# Patient Record
Sex: FEMALE | Race: WHITE | NOT HISPANIC OR LATINO | URBAN - METROPOLITAN AREA
[De-identification: names, ages, dates, MRNs, and addresses within clinical notes are randomized per-mention and may not be internally consistent; named-entity substitution may affect disease eponyms.]

---

## 2023-05-15 ENCOUNTER — INPATIENT (INPATIENT)
Facility: HOSPITAL | Age: 78
LOS: 4 days | Discharge: NURSING, REHAB OR LONG-TERM CARE | DRG: 683 | End: 2023-05-20
Attending: INTERNAL MEDICINE | Admitting: INTERNAL MEDICINE
Payer: MEDICARE

## 2023-05-15 VITALS
TEMPERATURE: 97 F | DIASTOLIC BLOOD PRESSURE: 63 MMHG | SYSTOLIC BLOOD PRESSURE: 118 MMHG | RESPIRATION RATE: 20 BRPM | HEART RATE: 64 BPM | OXYGEN SATURATION: 94 %

## 2023-05-15 DIAGNOSIS — Z79.899 OTHER LONG TERM (CURRENT) DRUG THERAPY: ICD-10-CM

## 2023-05-15 DIAGNOSIS — I44.7 LEFT BUNDLE-BRANCH BLOCK, UNSPECIFIED: ICD-10-CM

## 2023-05-15 DIAGNOSIS — K22.2 ESOPHAGEAL OBSTRUCTION: ICD-10-CM

## 2023-05-15 DIAGNOSIS — N18.32 CHRONIC KIDNEY DISEASE, STAGE 3B: ICD-10-CM

## 2023-05-15 DIAGNOSIS — K44.9 DIAPHRAGMATIC HERNIA WITHOUT OBSTRUCTION OR GANGRENE: ICD-10-CM

## 2023-05-15 DIAGNOSIS — D73.9 DISEASE OF SPLEEN, UNSPECIFIED: ICD-10-CM

## 2023-05-15 DIAGNOSIS — I13.0 HYPERTENSIVE HEART AND CHRONIC KIDNEY DISEASE WITH HEART FAILURE AND STAGE 1 THROUGH STAGE 4 CHRONIC KIDNEY DISEASE, OR UNSPECIFIED CHRONIC KIDNEY DISEASE: ICD-10-CM

## 2023-05-15 DIAGNOSIS — K59.00 CONSTIPATION, UNSPECIFIED: ICD-10-CM

## 2023-05-15 DIAGNOSIS — R73.03 PREDIABETES: ICD-10-CM

## 2023-05-15 DIAGNOSIS — K29.70 GASTRITIS, UNSPECIFIED, WITHOUT BLEEDING: ICD-10-CM

## 2023-05-15 DIAGNOSIS — K02.9 DENTAL CARIES, UNSPECIFIED: ICD-10-CM

## 2023-05-15 DIAGNOSIS — R53.1 WEAKNESS: ICD-10-CM

## 2023-05-15 DIAGNOSIS — I50.32 CHRONIC DIASTOLIC (CONGESTIVE) HEART FAILURE: ICD-10-CM

## 2023-05-15 DIAGNOSIS — I24.8 OTHER FORMS OF ACUTE ISCHEMIC HEART DISEASE: ICD-10-CM

## 2023-05-15 DIAGNOSIS — K25.9 GASTRIC ULCER, UNSPECIFIED AS ACUTE OR CHRONIC, WITHOUT HEMORRHAGE OR PERFORATION: ICD-10-CM

## 2023-05-15 DIAGNOSIS — N17.9 ACUTE KIDNEY FAILURE, UNSPECIFIED: ICD-10-CM

## 2023-05-15 DIAGNOSIS — K75.3 GRANULOMATOUS HEPATITIS, NOT ELSEWHERE CLASSIFIED: ICD-10-CM

## 2023-05-15 DIAGNOSIS — E86.0 DEHYDRATION: ICD-10-CM

## 2023-05-15 DIAGNOSIS — E03.9 HYPOTHYROIDISM, UNSPECIFIED: ICD-10-CM

## 2023-05-15 DIAGNOSIS — K08.409 PARTIAL LOSS OF TEETH, UNSPECIFIED CAUSE, UNSPECIFIED CLASS: ICD-10-CM

## 2023-05-15 DIAGNOSIS — E87.6 HYPOKALEMIA: ICD-10-CM

## 2023-05-15 DIAGNOSIS — M47.9 SPONDYLOSIS, UNSPECIFIED: ICD-10-CM

## 2023-05-15 DIAGNOSIS — Z87.891 PERSONAL HISTORY OF NICOTINE DEPENDENCE: ICD-10-CM

## 2023-05-15 DIAGNOSIS — D72.828 OTHER ELEVATED WHITE BLOOD CELL COUNT: ICD-10-CM

## 2023-05-15 DIAGNOSIS — N20.0 CALCULUS OF KIDNEY: ICD-10-CM

## 2023-05-15 DIAGNOSIS — R13.10 DYSPHAGIA, UNSPECIFIED: ICD-10-CM

## 2023-05-15 DIAGNOSIS — G89.29 OTHER CHRONIC PAIN: ICD-10-CM

## 2023-05-15 DIAGNOSIS — R93.89 ABNORMAL FINDINGS ON DIAGNOSTIC IMAGING OF OTHER SPECIFIED BODY STRUCTURES: ICD-10-CM

## 2023-05-15 DIAGNOSIS — Z20.822 CONTACT WITH AND (SUSPECTED) EXPOSURE TO COVID-19: ICD-10-CM

## 2023-05-15 DIAGNOSIS — E83.42 HYPOMAGNESEMIA: ICD-10-CM

## 2023-05-15 DIAGNOSIS — I27.20 PULMONARY HYPERTENSION, UNSPECIFIED: ICD-10-CM

## 2023-05-15 LAB
ALBUMIN SERPL ELPH-MCNC: 4.1 G/DL — SIGNIFICANT CHANGE UP (ref 3.5–5.2)
ALP SERPL-CCNC: 65 U/L — SIGNIFICANT CHANGE UP (ref 30–115)
ALT FLD-CCNC: <5 U/L — SIGNIFICANT CHANGE UP (ref 0–41)
ANION GAP SERPL CALC-SCNC: 14 MMOL/L — SIGNIFICANT CHANGE UP (ref 7–14)
ANION GAP SERPL CALC-SCNC: 17 MMOL/L — HIGH (ref 7–14)
APPEARANCE UR: CLEAR — SIGNIFICANT CHANGE UP
AST SERPL-CCNC: 11 U/L — SIGNIFICANT CHANGE UP (ref 0–41)
BACTERIA # UR AUTO: ABNORMAL /HPF
BASE EXCESS BLDV CALC-SCNC: 2.6 MMOL/L — SIGNIFICANT CHANGE UP (ref -2–3)
BASOPHILS # BLD AUTO: 0.07 K/UL — SIGNIFICANT CHANGE UP (ref 0–0.2)
BASOPHILS NFR BLD AUTO: 0.7 % — SIGNIFICANT CHANGE UP (ref 0–1)
BILIRUB SERPL-MCNC: 0.9 MG/DL — SIGNIFICANT CHANGE UP (ref 0.2–1.2)
BILIRUB UR-MCNC: NEGATIVE — SIGNIFICANT CHANGE UP
BUN SERPL-MCNC: 34 MG/DL — HIGH (ref 10–20)
BUN SERPL-MCNC: 41 MG/DL — HIGH (ref 10–20)
CA-I SERPL-SCNC: 1.19 MMOL/L — SIGNIFICANT CHANGE UP (ref 1.15–1.33)
CALCIUM SERPL-MCNC: 10.3 MG/DL — SIGNIFICANT CHANGE UP (ref 8.4–10.5)
CALCIUM SERPL-MCNC: 9.4 MG/DL — SIGNIFICANT CHANGE UP (ref 8.4–10.5)
CHLORIDE SERPL-SCNC: 100 MMOL/L — SIGNIFICANT CHANGE UP (ref 98–110)
CHLORIDE SERPL-SCNC: 104 MMOL/L — SIGNIFICANT CHANGE UP (ref 98–110)
CK MB CFR SERPL CALC: 1.6 NG/ML — SIGNIFICANT CHANGE UP (ref 0.6–6.3)
CK SERPL-CCNC: 40 U/L — SIGNIFICANT CHANGE UP (ref 0–225)
CO2 SERPL-SCNC: 25 MMOL/L — SIGNIFICANT CHANGE UP (ref 17–32)
CO2 SERPL-SCNC: 25 MMOL/L — SIGNIFICANT CHANGE UP (ref 17–32)
COLOR SPEC: YELLOW — SIGNIFICANT CHANGE UP
CREAT SERPL-MCNC: 2.7 MG/DL — HIGH (ref 0.7–1.5)
CREAT SERPL-MCNC: 3.5 MG/DL — HIGH (ref 0.7–1.5)
DIFF PNL FLD: NEGATIVE — SIGNIFICANT CHANGE UP
EGFR: 13 ML/MIN/1.73M2 — LOW
EGFR: 18 ML/MIN/1.73M2 — LOW
EOSINOPHIL # BLD AUTO: 0.23 K/UL — SIGNIFICANT CHANGE UP (ref 0–0.7)
EOSINOPHIL NFR BLD AUTO: 2.2 % — SIGNIFICANT CHANGE UP (ref 0–8)
EPI CELLS # UR: ABNORMAL /HPF (ref 0–5)
GAS PNL BLDV: 135 MMOL/L — LOW (ref 136–145)
GAS PNL BLDV: SIGNIFICANT CHANGE UP
GLUCOSE SERPL-MCNC: 108 MG/DL — HIGH (ref 70–99)
GLUCOSE SERPL-MCNC: 115 MG/DL — HIGH (ref 70–99)
GLUCOSE UR QL: NEGATIVE MG/DL — SIGNIFICANT CHANGE UP
HCO3 BLDV-SCNC: 28 MMOL/L — SIGNIFICANT CHANGE UP (ref 22–29)
HCT VFR BLD CALC: 36.7 % — LOW (ref 37–47)
HCT VFR BLDA CALC: 52 % — HIGH (ref 39–51)
HGB BLD CALC-MCNC: 17.2 G/DL — SIGNIFICANT CHANGE UP (ref 12.6–17.4)
HGB BLD-MCNC: 12.8 G/DL — SIGNIFICANT CHANGE UP (ref 12–16)
IMM GRANULOCYTES NFR BLD AUTO: 0.3 % — SIGNIFICANT CHANGE UP (ref 0.1–0.3)
KETONES UR-MCNC: NEGATIVE — SIGNIFICANT CHANGE UP
LACTATE BLDV-MCNC: 1.3 MMOL/L — SIGNIFICANT CHANGE UP (ref 0.5–2)
LEUKOCYTE ESTERASE UR-ACNC: NEGATIVE — SIGNIFICANT CHANGE UP
LIDOCAIN IGE QN: 42 U/L — SIGNIFICANT CHANGE UP (ref 7–60)
LYMPHOCYTES # BLD AUTO: 19.8 % — LOW (ref 20.5–51.1)
LYMPHOCYTES # BLD AUTO: 2.09 K/UL — SIGNIFICANT CHANGE UP (ref 1.2–3.4)
MAGNESIUM SERPL-MCNC: 1.5 MG/DL — LOW (ref 1.8–2.4)
MAGNESIUM SERPL-MCNC: 1.6 MG/DL — LOW (ref 1.8–2.4)
MCHC RBC-ENTMCNC: 33.2 PG — HIGH (ref 27–31)
MCHC RBC-ENTMCNC: 34.9 G/DL — SIGNIFICANT CHANGE UP (ref 32–37)
MCV RBC AUTO: 95.3 FL — SIGNIFICANT CHANGE UP (ref 81–99)
MONOCYTES # BLD AUTO: 0.86 K/UL — HIGH (ref 0.1–0.6)
MONOCYTES NFR BLD AUTO: 8.1 % — SIGNIFICANT CHANGE UP (ref 1.7–9.3)
NEUTROPHILS # BLD AUTO: 7.28 K/UL — HIGH (ref 1.4–6.5)
NEUTROPHILS NFR BLD AUTO: 68.9 % — SIGNIFICANT CHANGE UP (ref 42.2–75.2)
NITRITE UR-MCNC: NEGATIVE — SIGNIFICANT CHANGE UP
NRBC # BLD: 0 /100 WBCS — SIGNIFICANT CHANGE UP (ref 0–0)
PCO2 BLDV: 44 MMHG — HIGH (ref 39–42)
PH BLDV: 7.41 — SIGNIFICANT CHANGE UP (ref 7.32–7.43)
PH UR: 5.5 — SIGNIFICANT CHANGE UP (ref 5–8)
PLATELET # BLD AUTO: 253 K/UL — SIGNIFICANT CHANGE UP (ref 130–400)
PMV BLD: 10.9 FL — HIGH (ref 7.4–10.4)
PO2 BLDV: 30 MMHG — SIGNIFICANT CHANGE UP
POTASSIUM BLDV-SCNC: 2.6 MMOL/L — CRITICAL LOW (ref 3.5–5.1)
POTASSIUM SERPL-MCNC: 2.7 MMOL/L — CRITICAL LOW (ref 3.5–5)
POTASSIUM SERPL-MCNC: 2.8 MMOL/L — LOW (ref 3.5–5)
POTASSIUM SERPL-SCNC: 2.7 MMOL/L — CRITICAL LOW (ref 3.5–5)
POTASSIUM SERPL-SCNC: 2.8 MMOL/L — LOW (ref 3.5–5)
PROT SERPL-MCNC: 6.7 G/DL — SIGNIFICANT CHANGE UP (ref 6–8)
PROT UR-MCNC: ABNORMAL MG/DL
RBC # BLD: 3.85 M/UL — LOW (ref 4.2–5.4)
RBC # FLD: 12.5 % — SIGNIFICANT CHANGE UP (ref 11.5–14.5)
RBC CASTS # UR COMP ASSIST: SIGNIFICANT CHANGE UP /HPF (ref 0–4)
SAO2 % BLDV: 54.6 % — SIGNIFICANT CHANGE UP
SODIUM SERPL-SCNC: 142 MMOL/L — SIGNIFICANT CHANGE UP (ref 135–146)
SODIUM SERPL-SCNC: 143 MMOL/L — SIGNIFICANT CHANGE UP (ref 135–146)
SP GR SPEC: >=1.03 (ref 1.01–1.03)
TROPONIN T SERPL-MCNC: 0.03 NG/ML — CRITICAL HIGH
TROPONIN T SERPL-MCNC: 0.04 NG/ML — CRITICAL HIGH
UROBILINOGEN FLD QL: 0.2 MG/DL — SIGNIFICANT CHANGE UP
WBC # BLD: 10.56 K/UL — SIGNIFICANT CHANGE UP (ref 4.8–10.8)
WBC # FLD AUTO: 10.56 K/UL — SIGNIFICANT CHANGE UP (ref 4.8–10.8)
WBC UR QL: SIGNIFICANT CHANGE UP /HPF (ref 0–5)

## 2023-05-15 PROCEDURE — 97116 GAIT TRAINING THERAPY: CPT | Mod: GP

## 2023-05-15 PROCEDURE — 85027 COMPLETE CBC AUTOMATED: CPT

## 2023-05-15 PROCEDURE — 80048 BASIC METABOLIC PNL TOTAL CA: CPT

## 2023-05-15 PROCEDURE — 74176 CT ABD & PELVIS W/O CONTRAST: CPT | Mod: 26,MA

## 2023-05-15 PROCEDURE — 82962 GLUCOSE BLOOD TEST: CPT

## 2023-05-15 PROCEDURE — 84436 ASSAY OF TOTAL THYROXINE: CPT

## 2023-05-15 PROCEDURE — 92610 EVALUATE SWALLOWING FUNCTION: CPT | Mod: GN

## 2023-05-15 PROCEDURE — 85730 THROMBOPLASTIN TIME PARTIAL: CPT

## 2023-05-15 PROCEDURE — 84100 ASSAY OF PHOSPHORUS: CPT

## 2023-05-15 PROCEDURE — 97110 THERAPEUTIC EXERCISES: CPT | Mod: GP

## 2023-05-15 PROCEDURE — 84484 ASSAY OF TROPONIN QUANT: CPT

## 2023-05-15 PROCEDURE — 94760 N-INVAS EAR/PLS OXIMETRY 1: CPT

## 2023-05-15 PROCEDURE — 83735 ASSAY OF MAGNESIUM: CPT

## 2023-05-15 PROCEDURE — 36415 COLL VENOUS BLD VENIPUNCTURE: CPT

## 2023-05-15 PROCEDURE — 97162 PT EVAL MOD COMPLEX 30 MIN: CPT | Mod: GP

## 2023-05-15 PROCEDURE — 82550 ASSAY OF CK (CPK): CPT

## 2023-05-15 PROCEDURE — 84443 ASSAY THYROID STIM HORMONE: CPT

## 2023-05-15 PROCEDURE — 83036 HEMOGLOBIN GLYCOSYLATED A1C: CPT

## 2023-05-15 PROCEDURE — 71045 X-RAY EXAM CHEST 1 VIEW: CPT | Mod: 26

## 2023-05-15 PROCEDURE — 88305 TISSUE EXAM BY PATHOLOGIST: CPT

## 2023-05-15 PROCEDURE — 80053 COMPREHEN METABOLIC PANEL: CPT

## 2023-05-15 PROCEDURE — 85610 PROTHROMBIN TIME: CPT

## 2023-05-15 PROCEDURE — 87635 SARS-COV-2 COVID-19 AMP PRB: CPT

## 2023-05-15 PROCEDURE — 82553 CREATINE MB FRACTION: CPT

## 2023-05-15 PROCEDURE — 93306 TTE W/DOPPLER COMPLETE: CPT

## 2023-05-15 PROCEDURE — 85025 COMPLETE CBC W/AUTO DIFF WBC: CPT

## 2023-05-15 PROCEDURE — 99285 EMERGENCY DEPT VISIT HI MDM: CPT | Mod: FS

## 2023-05-15 PROCEDURE — 88312 SPECIAL STAINS GROUP 1: CPT

## 2023-05-15 PROCEDURE — 99222 1ST HOSP IP/OBS MODERATE 55: CPT

## 2023-05-15 PROCEDURE — 84439 ASSAY OF FREE THYROXINE: CPT

## 2023-05-15 RX ORDER — ACETAMINOPHEN 500 MG
650 TABLET ORAL EVERY 6 HOURS
Refills: 0 | Status: DISCONTINUED | OUTPATIENT
Start: 2023-05-15 | End: 2023-05-19

## 2023-05-15 RX ORDER — POTASSIUM CHLORIDE 20 MEQ
20 PACKET (EA) ORAL ONCE
Refills: 0 | Status: COMPLETED | OUTPATIENT
Start: 2023-05-15 | End: 2023-05-15

## 2023-05-15 RX ORDER — AMLODIPINE BESYLATE 2.5 MG/1
5 TABLET ORAL EVERY 24 HOURS
Refills: 0 | Status: DISCONTINUED | OUTPATIENT
Start: 2023-05-15 | End: 2023-05-19

## 2023-05-15 RX ORDER — MAGNESIUM OXIDE 400 MG ORAL TABLET 241.3 MG
400 TABLET ORAL ONCE
Refills: 0 | Status: COMPLETED | OUTPATIENT
Start: 2023-05-15 | End: 2023-05-15

## 2023-05-15 RX ORDER — SODIUM CHLORIDE 9 MG/ML
1000 INJECTION INTRAMUSCULAR; INTRAVENOUS; SUBCUTANEOUS
Refills: 0 | Status: DISCONTINUED | OUTPATIENT
Start: 2023-05-15 | End: 2023-05-17

## 2023-05-15 RX ORDER — MAGNESIUM SULFATE 500 MG/ML
2 VIAL (ML) INJECTION ONCE
Refills: 0 | Status: COMPLETED | OUTPATIENT
Start: 2023-05-15 | End: 2023-05-16

## 2023-05-15 RX ORDER — POTASSIUM CHLORIDE 20 MEQ
20 PACKET (EA) ORAL
Refills: 0 | Status: COMPLETED | OUTPATIENT
Start: 2023-05-15 | End: 2023-05-16

## 2023-05-15 RX ORDER — CHLORHEXIDINE GLUCONATE 213 G/1000ML
1 SOLUTION TOPICAL
Refills: 0 | Status: DISCONTINUED | OUTPATIENT
Start: 2023-05-15 | End: 2023-05-19

## 2023-05-15 RX ORDER — METOPROLOL TARTRATE 50 MG
50 TABLET ORAL DAILY
Refills: 0 | Status: DISCONTINUED | OUTPATIENT
Start: 2023-05-15 | End: 2023-05-19

## 2023-05-15 RX ORDER — LEVOTHYROXINE SODIUM 125 MCG
25 TABLET ORAL
Refills: 0 | Status: DISCONTINUED | OUTPATIENT
Start: 2023-05-15 | End: 2023-05-19

## 2023-05-15 RX ORDER — AMLODIPINE BESYLATE 2.5 MG/1
0 TABLET ORAL
Refills: 0 | DISCHARGE

## 2023-05-15 RX ORDER — PANTOPRAZOLE SODIUM 20 MG/1
40 TABLET, DELAYED RELEASE ORAL
Refills: 0 | Status: DISCONTINUED | OUTPATIENT
Start: 2023-05-15 | End: 2023-05-16

## 2023-05-15 RX ORDER — HEPARIN SODIUM 5000 [USP'U]/ML
5000 INJECTION INTRAVENOUS; SUBCUTANEOUS EVERY 8 HOURS
Refills: 0 | Status: DISCONTINUED | OUTPATIENT
Start: 2023-05-15 | End: 2023-05-19

## 2023-05-15 RX ORDER — SODIUM CHLORIDE 9 MG/ML
1000 INJECTION INTRAMUSCULAR; INTRAVENOUS; SUBCUTANEOUS ONCE
Refills: 0 | Status: COMPLETED | OUTPATIENT
Start: 2023-05-15 | End: 2023-05-15

## 2023-05-15 RX ORDER — METOPROLOL TARTRATE 50 MG
0 TABLET ORAL
Refills: 0 | DISCHARGE

## 2023-05-15 RX ADMIN — SODIUM CHLORIDE 75 MILLILITER(S): 9 INJECTION INTRAMUSCULAR; INTRAVENOUS; SUBCUTANEOUS at 16:29

## 2023-05-15 RX ADMIN — SODIUM CHLORIDE 1000 MILLILITER(S): 9 INJECTION INTRAMUSCULAR; INTRAVENOUS; SUBCUTANEOUS at 12:24

## 2023-05-15 RX ADMIN — HEPARIN SODIUM 5000 UNIT(S): 5000 INJECTION INTRAVENOUS; SUBCUTANEOUS at 21:16

## 2023-05-15 RX ADMIN — Medication 20 MILLIEQUIVALENT(S): at 14:09

## 2023-05-15 RX ADMIN — MAGNESIUM OXIDE 400 MG ORAL TABLET 400 MILLIGRAM(S): 241.3 TABLET ORAL at 13:20

## 2023-05-15 RX ADMIN — Medication 20 MILLIEQUIVALENT(S): at 22:45

## 2023-05-15 RX ADMIN — SODIUM CHLORIDE 75 MILLILITER(S): 9 INJECTION INTRAMUSCULAR; INTRAVENOUS; SUBCUTANEOUS at 17:18

## 2023-05-15 NOTE — ED PROVIDER NOTE - NSICDXPASTMEDICALHX_GEN_ALL_CORE_FT
PAST MEDICAL HISTORY:  Arthritis     HTN (hypertension)     Hypothyroidism     Kidney stone     Stage 3 chronic kidney disease

## 2023-05-15 NOTE — ED PROVIDER NOTE - NS ED ROS FT
Review of Systems    Constitutional: (-) fever, (-) chills, (+)weakness  Eyes/ENT: (-) blurry vision, (-) epistaxis, (-) sore throat  Cardiovascular: (-) chest pain, (-) syncope, (+)palpitations  Respiratory: (-) cough, (-) shortness of breath  Gastrointestinal: (+) decreased PO intake, (-) pain, (-) nausea, (-) vomiting, (-) diarrhea  Musculoskeletal: (-) neck pain, (-) back pain, (-) body aches  Integumentary: (-) rash, (-) edema  Neurological: (-) headache, (-) altered mental status  Psychiatric: (-) hallucinations  Allergic/Immunologic: (-) pruritus

## 2023-05-15 NOTE — CONSULT NOTE ADULT - SUBJECTIVE AND OBJECTIVE BOX
HPI:  A 76 y/o female with pmhx of HTN, CKD 3, hypothyroidism, kidney stone , pre-DM,  arthritis of spine  presents with generalized weakness. PT reports not feeling well for the past week. PT reports not eating much, has appetite, reports eats few bites a day. Pt reports 2 weeks ago had dental infection/abscess had 2 extraction at front was treated with amoxicillin x 1 week. Pt denies difficulty swallowing, PT reports feeling weak today , could not get up. PT usually ambulates with a cane.  PT denies nausea or vomiting. Pt reports palpitation yesterday for few minutes. Pt denies chest pain or shortness of breath. PT reports hx of constipation, last BM yesterday. Pt reports dizziness yesterday systolic 95.  PT reports does not follow up with nephrologist for CKD. PT states has been taking at least 600  mg ibuprofen daily for past 2 weeks for toothache. PT denies fever, chills, cough, burning with urination. Pt lives with her sister needs help at home per son.  (15 May 2023 14:44)        HPI-Cardiology   Pt with the above Hx, evaluated at bedside. Pt....................... Radiology tests and hospital records, were reviewed, as well as previous notes on this patient.          PAST MEDICAL & SURGICAL HISTORY  HTN (hypertension)    Hypothyroidism    Stage 3 chronic kidney disease    Arthritis    Kidney stone          ALLERGIES:  No Known Allergies      MEDICATIONS:  MEDICATIONS  (STANDING):  amLODIPine   Tablet 5 milliGRAM(s) Oral every 24 hours  chlorhexidine 2% Cloths 1 Application(s) Topical <User Schedule>  heparin   Injectable 5000 Unit(s) SubCutaneous every 8 hours  levothyroxine 25 MICROGram(s) Oral <User Schedule>  metoprolol succinate ER 50 milliGRAM(s) Oral daily  pantoprazole    Tablet 40 milliGRAM(s) Oral before breakfast  sodium chloride 0.9%. 1000 milliLiter(s) (75 mL/Hr) IV Continuous <Continuous>    MEDICATIONS  (PRN):  acetaminophen     Tablet .. 650 milliGRAM(s) Oral every 6 hours PRN Temp greater or equal to 38C (100.4F), Mild Pain (1 - 3)      HOME MEDICATIONS:  Home Medications:  amLODIPine 5 mg oral tablet: 1 orally once a day (15 May 2023 15:33)  losartan-hydrochlorothiazide 50 mg-12.5 mg oral tablet: 1 orally once a day (15 May 2023 15:33)  metoprolol succinate 50 mg oral capsule, extended release: 1 orally once a day (15 May 2023 15:33)  Synthroid 25 mcg (0.025 mg) oral tablet: 1 orally 2 times a week every Tuesday (15 May 2023 15:33)      VITALS:   T(F): 96.5 (05-15 @ 13:55), Max: 96.8 (05-15 @ 09:54)  HR: 60 (05-15 @ 14:48) (53 - 64)  BP: 105/52 (05-15 @ 14:48) (105/52 - 120/58)  BP(mean): --  RR: 18 (05-15 @ 14:48) (18 - 20)  SpO2: 95% (05-15 @ 14:48) (94% - 95%)        REVIEW OF SYSTEMS:  See HPI        PHYSICAL EXAM:  NEURO: patient is awake , alert and oriented  GEN: Not in acute distress  NECK: no thyroid enlargement, no JVD  LUNGS: Clear to auscultation bilaterally   CARDIOVASCULAR: S1/S2 present, RRR , no murmurs or rubs, no carotid bruits,  + PP bilaterally  ABD: Soft, non-tender, non-distended, +BS  EXT: No LENCHO  SKIN: Intact          LABS:                        12.8   10.56 )-----------( 253      ( 15 May 2023 10:35 )             36.7     05-15    142  |  100  |  41<H>  ----------------------------<  115<H>  2.7<LL>   |  25  |  3.5<H>    Ca    10.3      15 May 2023 10:35  Mg     1.6     05-15    TPro  6.7  /  Alb  4.1  /  TBili  0.9  /  DBili  x   /  AST  11  /  ALT  <5  /  AlkPhos  65  05-15      Troponin T, Serum: 0.04 ng/mL *HH* (05-15-23 @ 10:35)    CARDIAC MARKERS ( 15 May 2023 10:35 )  x     / 0.04 ng/mL / x     / x     / x                RADIOLOGY:  -CXR:  < from: Xray Chest 1 View- PORTABLE-Urgent (05.15.23 @ 10:56) >  Impression:    No radiographic evidence of acute cardiopulmonary disease.        --- End of Report ---    < end of copied text >          ECG:  < from: 12 Lead ECG (05.15.23 @ 10:06) >   Sinus bradycardia  Left axis deviation  Left bundle branch block  Abnormal ECG    Confirmed by Shahid Rodriguez ([id number]) on 5/15/2023 12:02:42 PM    < end of copied text >     HPI:  A 76 y/o female with pmhx of HTN, CKD 3, hypothyroidism, kidney stone , pre-DM,  arthritis of spine  presents with generalized weakness. PT reports not feeling well for the past week. PT reports not eating much, has appetite, reports eats few bites a day. Pt reports 2 weeks ago had dental infection/abscess had 2 extraction at front was treated with amoxicillin x 1 week. Pt denies difficulty swallowing, PT reports feeling weak today , could not get up. PT usually ambulates with a cane.  PT denies nausea or vomiting. Pt reports palpitation yesterday for few minutes. Pt denies chest pain or shortness of breath. PT reports hx of constipation, last BM yesterday. Pt reports dizziness yesterday systolic 95.  PT reports does not follow up with nephrologist for CKD. PT states has been taking at least 600  mg ibuprofen daily for past 2 weeks for toothache. PT denies fever, chills, cough, burning with urination. Pt lives with her sister needs help at home per son.  (15 May 2023 14:44)        HPI-Cardiology   Pt with the above Hx, evaluated at bedside. Pt presented for eval of generalized weakness. Pt endorses poor PO intake for the past month and progressively feeling weaker. Endorses episode of palpitations while going to the bathroom which subsided after 20min. Currently denies any CP, palpitations SOB, dizziness/lightheadedness or nausea/vomiting. Radiology tests and hospital records, were reviewed, as well as previous notes on this patient.          PAST MEDICAL & SURGICAL HISTORY  HTN (hypertension)    Hypothyroidism    Stage 3 chronic kidney disease    Arthritis    Kidney stone          ALLERGIES:  No Known Allergies      MEDICATIONS:  MEDICATIONS  (STANDING):  amLODIPine   Tablet 5 milliGRAM(s) Oral every 24 hours  chlorhexidine 2% Cloths 1 Application(s) Topical <User Schedule>  heparin   Injectable 5000 Unit(s) SubCutaneous every 8 hours  levothyroxine 25 MICROGram(s) Oral <User Schedule>  metoprolol succinate ER 50 milliGRAM(s) Oral daily  pantoprazole    Tablet 40 milliGRAM(s) Oral before breakfast  sodium chloride 0.9%. 1000 milliLiter(s) (75 mL/Hr) IV Continuous <Continuous>    MEDICATIONS  (PRN):  acetaminophen     Tablet .. 650 milliGRAM(s) Oral every 6 hours PRN Temp greater or equal to 38C (100.4F), Mild Pain (1 - 3)      HOME MEDICATIONS:  Home Medications:  amLODIPine 5 mg oral tablet: 1 orally once a day (15 May 2023 15:33)  losartan-hydrochlorothiazide 50 mg-12.5 mg oral tablet: 1 orally once a day (15 May 2023 15:33)  metoprolol succinate 50 mg oral capsule, extended release: 1 orally once a day (15 May 2023 15:33)  Synthroid 25 mcg (0.025 mg) oral tablet: 1 orally 2 times a week every Tuesday (15 May 2023 15:33)      VITALS:   T(F): 96.5 (05-15 @ 13:55), Max: 96.8 (05-15 @ 09:54)  HR: 60 (05-15 @ 14:48) (53 - 64)  BP: 105/52 (05-15 @ 14:48) (105/52 - 120/58)  BP(mean): --  RR: 18 (05-15 @ 14:48) (18 - 20)  SpO2: 95% (05-15 @ 14:48) (94% - 95%)        REVIEW OF SYSTEMS:  See HPI        PHYSICAL EXAM:  NEURO: patient is awake , alert and oriented  GEN: Not in acute distress  NECK: no thyroid enlargement, no JVD  LUNGS: Clear to auscultation bilaterally   CARDIOVASCULAR: S1/S2 present, RRR , no murmurs or rubs, no carotid bruits,  + PP bilaterally  ABD: Soft, non-tender, non-distended, +BS  EXT: No LENCHO  SKIN: Intact          LABS:                        12.8   10.56 )-----------( 253      ( 15 May 2023 10:35 )             36.7     05-15    142  |  100  |  41<H>  ----------------------------<  115<H>  2.7<LL>   |  25  |  3.5<H>    Ca    10.3      15 May 2023 10:35  Mg     1.6     05-15    TPro  6.7  /  Alb  4.1  /  TBili  0.9  /  DBili  x   /  AST  11  /  ALT  <5  /  AlkPhos  65  05-15      Troponin T, Serum: 0.04 ng/mL *HH* (05-15-23 @ 10:35)    CARDIAC MARKERS ( 15 May 2023 10:35 )  x     / 0.04 ng/mL / x     / x     / x                RADIOLOGY:  -CXR:  < from: Xray Chest 1 View- PORTABLE-Urgent (05.15.23 @ 10:56) >  Impression:    No radiographic evidence of acute cardiopulmonary disease.        --- End of Report ---    < end of copied text >          ECG:  < from: 12 Lead ECG (05.15.23 @ 10:06) >   Sinus bradycardia  Left axis deviation  Left bundle branch block  Abnormal ECG    Confirmed by Shahid Rodriguez ([id number]) on 5/15/2023 12:02:42 PM    < end of copied text >     HPI:  A 76 y/o female with pmhx of HTN, CKD 3, hypothyroidism, kidney stone , pre-DM,  arthritis of spine  presents with generalized weakness. PT reports not feeling well for the past week. PT reports not eating much, has appetite, reports eats few bites a day. Pt reports 2 weeks ago had dental infection/abscess had 2 extraction at front was treated with amoxicillin x 1 week. Pt denies difficulty swallowing, PT reports feeling weak today , could not get up. PT usually ambulates with a cane.  PT denies nausea or vomiting. Pt reports palpitation yesterday for few minutes. Pt denies chest pain or shortness of breath. PT reports hx of constipation, last BM yesterday. Pt reports dizziness yesterday systolic 95.  PT reports does not follow up with nephrologist for CKD. PT states has been taking at least 600  mg ibuprofen daily for past 2 weeks for toothache. PT denies fever, chills, cough, burning with urination. Pt lives with her sister needs help at home per son.  (15 May 2023 14:44)        HPI-Cardiology   Pt with the above Hx, evaluated at bedside. Pt presented for eval of generalized weakness. Pt endorses poor PO intake for the past month and progressively feeling weaker. Endorses episode of palpitations while going to the bathroom which subsided after 20min. Currently denies any CP, palpitations SOB, dizziness/lightheadedness or nausea/vomiting. Radiology tests and hospital records, were reviewed, as well as previous notes on this patient.          PAST MEDICAL & SURGICAL HISTORY  HTN (hypertension)    Hypothyroidism    Stage 3 chronic kidney disease    Arthritis    Kidney stone          ALLERGIES:  No Known Allergies      MEDICATIONS:  MEDICATIONS  (STANDING):  amLODIPine   Tablet 5 milliGRAM(s) Oral every 24 hours  chlorhexidine 2% Cloths 1 Application(s) Topical <User Schedule>  heparin   Injectable 5000 Unit(s) SubCutaneous every 8 hours  levothyroxine 25 MICROGram(s) Oral <User Schedule>  metoprolol succinate ER 50 milliGRAM(s) Oral daily  pantoprazole    Tablet 40 milliGRAM(s) Oral before breakfast  sodium chloride 0.9%. 1000 milliLiter(s) (75 mL/Hr) IV Continuous <Continuous>    MEDICATIONS  (PRN):  acetaminophen     Tablet .. 650 milliGRAM(s) Oral every 6 hours PRN Temp greater or equal to 38C (100.4F), Mild Pain (1 - 3)      HOME MEDICATIONS:  Home Medications:  amLODIPine 5 mg oral tablet: 1 orally once a day (15 May 2023 15:33)  losartan-hydrochlorothiazide 50 mg-12.5 mg oral tablet: 1 orally once a day (15 May 2023 15:33)  metoprolol succinate 50 mg oral capsule, extended release: 1 orally once a day (15 May 2023 15:33)  Synthroid 25 mcg (0.025 mg) oral tablet: 1 orally 2 times a week every Tuesday (15 May 2023 15:33)      VITALS:   T(F): 96.5 (05-15 @ 13:55), Max: 96.8 (05-15 @ 09:54)  HR: 60 (05-15 @ 14:48) (53 - 64)  BP: 105/52 (05-15 @ 14:48) (105/52 - 120/58)  BP(mean): --  RR: 18 (05-15 @ 14:48) (18 - 20)  SpO2: 95% (05-15 @ 14:48) (94% - 95%)        REVIEW OF SYSTEMS:  See HPI        PHYSICAL EXAM:  NEURO: patient is awake , alert and oriented  GEN: Not in acute distress  NECK: no thyroid enlargement, no JVD  LUNGS: Clear to auscultation bilaterally   CARDIOVASCULAR: S1/S2 present, RRR , 2/6 systolic murmur. no carotid bruits,  + PP bilaterally  ABD: Soft, non-tender, non-distended, +BS  EXT: No LENCHO  SKIN: Intact          LABS:                        12.8   10.56 )-----------( 253      ( 15 May 2023 10:35 )             36.7     05-15    142  |  100  |  41<H>  ----------------------------<  115<H>  2.7<LL>   |  25  |  3.5<H>    Ca    10.3      15 May 2023 10:35  Mg     1.6     05-15    TPro  6.7  /  Alb  4.1  /  TBili  0.9  /  DBili  x   /  AST  11  /  ALT  <5  /  AlkPhos  65  05-15      Troponin T, Serum: 0.04 ng/mL *HH* (05-15-23 @ 10:35)    CARDIAC MARKERS ( 15 May 2023 10:35 )  x     / 0.04 ng/mL / x     / x     / x                RADIOLOGY:  -CXR:  < from: Xray Chest 1 View- PORTABLE-Urgent (05.15.23 @ 10:56) >  Impression:    No radiographic evidence of acute cardiopulmonary disease.        --- End of Report ---    < end of copied text >          ECG:  < from: 12 Lead ECG (05.15.23 @ 10:06) >   Sinus bradycardia  Left axis deviation  Left bundle branch block  Abnormal ECG    Confirmed by Shahid Rodriguez ([id number]) on 5/15/2023 12:02:42 PM    < end of copied text >

## 2023-05-15 NOTE — H&P ADULT - NSHPLABSRESULTS_GEN_ALL_CORE
12.8   10.56 )-----------( 253      ( 15 May 2023 10:35 )             36.7       05-15    142  |  100  |  41<H>  ----------------------------<  115<H>  2.7<LL>   |  25  |  3.5<H>    Ca    10.3      15 May 2023 10:35  Mg     1.6     05-15    TPro  6.7  /  Alb  4.1  /  TBili  0.9  /  DBili  x   /  AST  11  /  ALT  <5  /  AlkPhos  65  05-15          Magnesium, Serum: 1.6 mg/dL (05-15-23 @ 10:35)      Urinalysis Basic - ( 15 May 2023 13:50 )    Color: Yellow / Appearance: Clear / SG: >=1.030 / pH: x  Gluc: x / Ketone: Negative  / Bili: Negative / Urobili: 0.2 mg/dL   Blood: x / Protein: Trace mg/dL / Nitrite: Negative   Leuk Esterase: Negative / RBC: 1-2 /HPF / WBC 1-2 /HPF   Sq Epi: x / Non Sq Epi: x / Bacteria: Few /HPF    Lactate Trend      CARDIAC MARKERS ( 15 May 2023 10:35 )  x     / 0.04 ng/mL / x     / x     / x    < from: CT Abdomen and Pelvis No Cont (05.15.23 @ 12:51) >    IMPRESSION:    1. No definite evidence of acute/inflammatory process within the abdomen   or pelvis.    2. Suggestion of mild endometrial thickening, which is an unexpected   finding in a postmenopausal patient, poorly evaluated on CT; further   evaluation with nonemergent dedicated pelvic ultrasound may be of use.    --- End of Report ---    CHAN WILKERSON MD; Attending Radiologist  This document has been electronically signed. May 15 2023  1:35PM    < end of copied text >    < from: Xray Chest 1 View- PORTABLE-Urgent (05.15.23 @ 10:56) >    Impression:    No radiographic evidence of acute cardiopulmonary disease.  --- End of Report ---    IRAIS MATHUR MD; Attending Radiologist  This document has been electronically signed. May 15 2023 11:02AM    < end of copied text >

## 2023-05-15 NOTE — H&P ADULT - NSHPPHYSICALEXAM_GEN_ALL_CORE
VITALS:     ICU Vital Signs Last 24 Hrs  T(C): 35.8 (15 May 2023 13:55), Max: 36 (15 May 2023 09:54)  T(F): 96.5 (15 May 2023 13:55), Max: 96.8 (15 May 2023 09:54)  HR: 53 (15 May 2023 13:55) (53 - 64)  BP: 120/58 (15 May 2023 13:55) (118/63 - 120/58)  RR: 18 (15 May 2023 13:55) (18 - 20)  SpO2: 95% (15 May 2023 13:55) (94% - 95%)    O2 Parameters below as of 15 May 2023 13:55  Patient On (Oxygen Delivery Method): room air      GENERAL: NAD, lying in bed comfortably  HEAD:  Atraumatic, Normocephalic  EYES: EOMI, PERRLA, conjunctiva and sclera clear  ENT: Moist mucous membranes, poor dentition, multiple caries, no bleeding , no abscess seen.    NECK: Supple, No JVD  CHEST/LUNG: Clear to auscultation bilaterally; No rales, rhonchi, wheezing, or rubs. Unlabored respirations  HEART: Regular rate and rhythm; No murmurs, rubs, or gallops  ABDOMEN:  Soft, Nontender, Nondistended.   EXTREMITIES: no edema or tenderness   NERVOUS SYSTEM:  Alert & Oriented X3, speech clear. No deficits   MSK: FROM all 4 extremities, full and equal strength  SKIN: No rashes or lesions

## 2023-05-15 NOTE — PATIENT PROFILE ADULT - NSPROGENSOURCEINFO_GEN_A_NUR
patient Alert-The patient is alert, awake and responds to voice. The patient is oriented to time, place, and person. The triage nurse is able to obtain subjective information.

## 2023-05-15 NOTE — H&P ADULT - ASSESSMENT
A 78 y/o female with pmhx of HTN, CKD 3, hypothyroidism, kidney stone , pre-DM,  arthritis of spine  presents with generalized weakness.     #generalized weakness  cxr negative, ua negative  -gentle IV hydration   -will correct electrolytes   -TSH level in am   -fall precaution   -PT consult     #hypokalemia   -s/p 40 meq given in ED  -will trend K level  -cw cardiac monitor     #hypomagnesemia   s/p 400 mg Mgox   -will trend Mg level     #PAMELA on CKD likely due to NSAID vs dehydration   CT a/p Several nonobstructing bilateral renal calculi measure up to 8 x   8 x 8 mm within the left renal lower pole  -IV hydration   -avoid nephrotoxic meds  -will hold HCTZ-losartan   -trend cr level   -will consider nephro consult if cr level does not improve     #elevated troponin   -cardiac monitor   -repeat EKG in am   -trend troponin level  -cardiology consult     #mild endometrial thickening  -out follow up with GYN for pelvis US     #HTN  -monitor BP level   -will hold HCTZ-Losartan 2/2 pamela   -cw metoprolol   -cw amlodipine     #hypothyroidism   cw levothyroxine 25 mcg 1x week on Tuesday   -TSH level     #arthritis of spine   -cw tylenol prn     #dental caries  -outpt follow up with  orthodontists    GI prophylaxis   DVT prophylaxis

## 2023-05-15 NOTE — PHYSICAL THERAPY INITIAL EVALUATION ADULT - GENERAL OBSERVATIONS, REHAB EVAL
17:55-18:20. Chart reviewed; confirmed with RN to see the pt for PT. Pt ready for PT; received in bed with no complain of pain and in NAD. +hep lock R UE, +tele. Agreeable for PT evaluation.

## 2023-05-15 NOTE — PHYSICAL THERAPY INITIAL EVALUATION ADULT - PERTINENT HX OF CURRENT PROBLEM, REHAB EVAL
A 76 y/o female with pmhx of HTN, CKD 3, hypothyroidism, kidney stone , pre-DM,  arthritis of spine  presents with generalized weakness. PT reports not feeling well for the past week. PT reports not eating much, has appetite, reports eats few bites a day. Pt reports 2 weeks ago had dental infection/abscess had 2 extraction at front was treated with amoxicillin x 1 week. Pt denies difficulty swallowing, PT reports feeling weak today , could not get up. PT usually ambulates with a cane.  PT denies nausea or vomiting. Pt reports palpitation yesterday for few minutes. Pt denies chest pain or shortness of breath. PT reports hx of constipation, last BM yesterday. Pt reports dizziness yesterday systolic 95.  PT reports does not follow up with nephrologist for CKD. PT states has been taking at least 600  mg ibuprofen daily for past 2 weeks for toothache. PT denies fever, chills, cough, burning with urination. Pt lives with her sister needs help at home per son.

## 2023-05-15 NOTE — ED PROVIDER NOTE - ATTENDING APP SHARED VISIT CONTRIBUTION OF CARE
Patient brought by family for not eating well for the last 2 to 3 weeks.  Patient has a past medical history of high blood pressure, prediabetes, kidney stones and chronic renal insufficiency.  She has been noncompliant to medications for some time.  Patient denies pain, trauma, fever.  Vital signs noted.  She has dry mucous membranes.  Chest is clear.  Heart regular rate 106 systolic murmur.  Abdomen nontender.  Except in right upper quadrant which shows mild tenderness.  There is no tenderness or distention over the bladder.  No CVA tenderness.  No rash seen.  Diagnostic testing reviewed.  Patient is in renal failure.  Interestingly, despite advanced renal insufficiency, her potassium and her magnesium is low.  Chritsin replacement of electrolyte deficiencies ordered.  CAT scan done.  Shows no sign of obstructive uropathy or kidney stone.  Chest x-ray shows no sign of fluid overload.  Patient's renal failure may at least in part be due to poor oral intake and prerenal azotemia.  IV fluids administered in the ED.  Because of hypokalemia, patient will be admitted to a monitored setting.  Case discussed with son in detail.

## 2023-05-15 NOTE — ED PROVIDER NOTE - CLINICAL SUMMARY MEDICAL DECISION MAKING FREE TEXT BOX
Please send note that we will repeat pap in 6 months due to heavy inflammation.  Thank you In my opinion, in patient treatment is medically justifiable and appropriate.  See attending note for documentation of medical decision making.

## 2023-05-15 NOTE — ED PROVIDER NOTE - OBJECTIVE STATEMENT
History from patient and son  77-year-old female history of kidney stones, prediabetes, decreased kidney function complaining of decreased p.o. intake, constipation (last bowel movement yesterday) and palpitations, weakness x 1 week.  Patient's sister took her blood pressure yesterday was 95 systolic.  Son saw patient today and decided to bring patient into the ED for evaluation

## 2023-05-15 NOTE — CONSULT NOTE ADULT - ASSESSMENT
78 y/o female with pmhx of HTN, CKD 3, hypothyroidism, kidney stone , pre-DM,  arthritis of spine  presents with generalized weakness. Found to have elevated troponin on workup      Impression:  #Elevated troponin  #Hypokalemia - 2.7  #Hypomagnesemia - 1.6  #PAMELA  #HTN      Pt..........  Troponin 0.04 x1. Likely O2 supply/demand mismatch in the hypovolemia?  ECG: Sinus Christos, LBBB. Unclear if old or new, as no prior ECG's on record  On tele appears in sinus  On exam appears euvolemic  Cxr: Unremarkable        Plan:  Cont to trend trop  Repeat ECG in am  Lipid profile, TSH, HgA1C  Obtain TTE  Hold Lisinopril for now, given renal function  Cont/w Metoprolol, Amlodipine with holding parameters   Renal consult  Monitor lytes, treat low K and Mg             76 y/o female with pmhx of HTN, CKD 3, hypothyroidism, kidney stone , pre-DM,  arthritis of spine  presents with generalized weakness. Found to have elevated troponin on workup      Impression:  #Elevated troponin  #Hypokalemia - 2.7  #Hypomagnesemia - 1.6  #PAMELA  #HTN      Pt with brief episode of palpitations at home the day before. Currently denies any CP, palpitations or SOB  Troponin 0.04 x1. Possibly O2 supply/demand mismatch 2/2 dehydration?   ECG: Sinus Christos, LBBB. Unclear if old or new, as no prior ECG's on record  On tele appears in sinus  On exam appears euvolemic  Cxr: Unremarkable        Plan:  Cont to trend trop  Repeat ECG in am  Lipid profile, TSH, HgA1C  Obtain TTE  Hold Lisinopril for now, given renal function  Cont/w Metoprolol, Amlodipine with holding parameters   Renal consult  Monitor lytes, treat low K and Mg        Discussed with Dr Rodriguez

## 2023-05-15 NOTE — ED ADULT TRIAGE NOTE - DIRECT TO ROOM CARE INITIATED:
AT THE PTS REQUEST< This RN did call his father Spoke to [address] [facility], Mr Ko Jon was advised that if the pt had a sober , he could be released to him, however, Mr Ko Jon stated "that he was in [address] [facility], and was on his way to work, and that [employer] in a grown man, he could find his own way home, Mr Ko Jon would not come and get him", provider updated     Lucy Barrett RN  12/24/17 [address] [place], RN  12/24/17 [id number] 15-May-2023 09:54

## 2023-05-15 NOTE — PATIENT PROFILE ADULT - SAFE PLACE TO LIVE
no low salt, low cholesterol, low fat, diabetic low salt, low cholesterol, low fat, diabetic, do not lift heavy Keep surgical incisions clean and dry. For any signs of infection such as fever over 100.4F, new pain that cannot be controlled with ordered medication, unusual discharge, and or chills, please call your primary care provider or visit the emergency room.

## 2023-05-15 NOTE — ED PROVIDER NOTE - NS ED ATTENDING STATEMENT MOD
This was a shared visit with the NADEEN. I reviewed and verified the documentation and independently performed the documented:

## 2023-05-15 NOTE — ED ADULT NURSE NOTE - NSFALLUNIVINTERV_ED_ALL_ED
Bed/Stretcher in lowest position, wheels locked, appropriate side rails in place/Call bell, personal items and telephone in reach/Instruct patient to call for assistance before getting out of bed/chair/stretcher/Non-slip footwear applied when patient is off stretcher/[place] to call system/Physically safe environment - no spills, clutter or unnecessary equipment/Purposeful proactive rounding/Room/bathroom lighting operational, light cord in reach

## 2023-05-15 NOTE — ED ADULT TRIAGE NOTE - CHIEF COMPLAINT QUOTE
Patient reports her blood pressure was low yesterday 95 systolic and reports decreased appetite over passed week

## 2023-05-15 NOTE — PHYSICAL THERAPY INITIAL EVALUATION ADULT - LIVES WITH, PROFILE
Pt lives with son in a private home with ~ 5 steps to enter and 1 flight of stairs to the bedroom+ 1 flight of stairs to the basement with one handrail.

## 2023-05-16 LAB
ALBUMIN SERPL ELPH-MCNC: 3.4 G/DL — LOW (ref 3.5–5.2)
ALP SERPL-CCNC: 58 U/L — SIGNIFICANT CHANGE UP (ref 30–115)
ALT FLD-CCNC: <5 U/L — SIGNIFICANT CHANGE UP (ref 0–41)
ANION GAP SERPL CALC-SCNC: 15 MMOL/L — HIGH (ref 7–14)
AST SERPL-CCNC: 9 U/L — SIGNIFICANT CHANGE UP (ref 0–41)
BILIRUB SERPL-MCNC: 0.6 MG/DL — SIGNIFICANT CHANGE UP (ref 0.2–1.2)
BUN SERPL-MCNC: 28 MG/DL — HIGH (ref 10–20)
CALCIUM SERPL-MCNC: 9.2 MG/DL — SIGNIFICANT CHANGE UP (ref 8.4–10.5)
CHLORIDE SERPL-SCNC: 103 MMOL/L — SIGNIFICANT CHANGE UP (ref 98–110)
CK MB CFR SERPL CALC: 1.1 NG/ML — SIGNIFICANT CHANGE UP (ref 0.6–6.3)
CK SERPL-CCNC: 29 U/L — SIGNIFICANT CHANGE UP (ref 0–225)
CO2 SERPL-SCNC: 25 MMOL/L — SIGNIFICANT CHANGE UP (ref 17–32)
CREAT SERPL-MCNC: 2 MG/DL — HIGH (ref 0.7–1.5)
EGFR: 25 ML/MIN/1.73M2 — LOW
GLUCOSE BLDC GLUCOMTR-MCNC: 133 MG/DL — HIGH (ref 70–99)
GLUCOSE BLDC GLUCOMTR-MCNC: 145 MG/DL — HIGH (ref 70–99)
GLUCOSE SERPL-MCNC: 92 MG/DL — SIGNIFICANT CHANGE UP (ref 70–99)
HCT VFR BLD CALC: 33 % — LOW (ref 37–47)
HGB BLD-MCNC: 11.5 G/DL — LOW (ref 12–16)
MAGNESIUM SERPL-MCNC: 2.4 MG/DL — SIGNIFICANT CHANGE UP (ref 1.8–2.4)
MCHC RBC-ENTMCNC: 33.1 PG — HIGH (ref 27–31)
MCHC RBC-ENTMCNC: 34.8 G/DL — SIGNIFICANT CHANGE UP (ref 32–37)
MCV RBC AUTO: 95.1 FL — SIGNIFICANT CHANGE UP (ref 81–99)
NRBC # BLD: 0 /100 WBCS — SIGNIFICANT CHANGE UP (ref 0–0)
PLATELET # BLD AUTO: 213 K/UL — SIGNIFICANT CHANGE UP (ref 130–400)
PMV BLD: 11.4 FL — HIGH (ref 7.4–10.4)
POTASSIUM SERPL-MCNC: 2.5 MMOL/L — CRITICAL LOW (ref 3.5–5)
POTASSIUM SERPL-SCNC: 2.5 MMOL/L — CRITICAL LOW (ref 3.5–5)
PROT SERPL-MCNC: 5.5 G/DL — LOW (ref 6–8)
RBC # BLD: 3.47 M/UL — LOW (ref 4.2–5.4)
RBC # FLD: 12.5 % — SIGNIFICANT CHANGE UP (ref 11.5–14.5)
SODIUM SERPL-SCNC: 143 MMOL/L — SIGNIFICANT CHANGE UP (ref 135–146)
TROPONIN T SERPL-MCNC: 0.01 NG/ML — SIGNIFICANT CHANGE UP
TSH SERPL-MCNC: 2.26 UIU/ML — SIGNIFICANT CHANGE UP (ref 0.27–4.2)
WBC # BLD: 8.94 K/UL — SIGNIFICANT CHANGE UP (ref 4.8–10.8)
WBC # FLD AUTO: 8.94 K/UL — SIGNIFICANT CHANGE UP (ref 4.8–10.8)

## 2023-05-16 PROCEDURE — 99232 SBSQ HOSP IP/OBS MODERATE 35: CPT

## 2023-05-16 PROCEDURE — 99223 1ST HOSP IP/OBS HIGH 75: CPT

## 2023-05-16 PROCEDURE — 99222 1ST HOSP IP/OBS MODERATE 55: CPT

## 2023-05-16 RX ORDER — POTASSIUM CHLORIDE 20 MEQ
20 PACKET (EA) ORAL
Refills: 0 | Status: COMPLETED | OUTPATIENT
Start: 2023-05-16 | End: 2023-05-16

## 2023-05-16 RX ORDER — MEGESTROL ACETATE 40 MG/ML
20 SUSPENSION ORAL DAILY
Refills: 0 | Status: DISCONTINUED | OUTPATIENT
Start: 2023-05-16 | End: 2023-05-19

## 2023-05-16 RX ORDER — PANTOPRAZOLE SODIUM 20 MG/1
40 TABLET, DELAYED RELEASE ORAL EVERY 12 HOURS
Refills: 0 | Status: DISCONTINUED | OUTPATIENT
Start: 2023-05-16 | End: 2023-05-18

## 2023-05-16 RX ORDER — MAGNESIUM SULFATE 500 MG/ML
2 VIAL (ML) INJECTION
Refills: 0 | Status: DISCONTINUED | OUTPATIENT
Start: 2023-05-16 | End: 2023-05-16

## 2023-05-16 RX ADMIN — Medication 50 MILLIEQUIVALENT(S): at 13:45

## 2023-05-16 RX ADMIN — Medication 20 MILLIEQUIVALENT(S): at 05:12

## 2023-05-16 RX ADMIN — Medication 650 MILLIGRAM(S): at 11:42

## 2023-05-16 RX ADMIN — MEGESTROL ACETATE 20 MILLIGRAM(S): 40 SUSPENSION ORAL at 11:45

## 2023-05-16 RX ADMIN — Medication 25 GRAM(S): at 00:14

## 2023-05-16 RX ADMIN — Medication 50 MILLIEQUIVALENT(S): at 16:23

## 2023-05-16 RX ADMIN — Medication 650 MILLIGRAM(S): at 12:30

## 2023-05-16 RX ADMIN — HEPARIN SODIUM 5000 UNIT(S): 5000 INJECTION INTRAVENOUS; SUBCUTANEOUS at 05:30

## 2023-05-16 RX ADMIN — HEPARIN SODIUM 5000 UNIT(S): 5000 INJECTION INTRAVENOUS; SUBCUTANEOUS at 13:45

## 2023-05-16 RX ADMIN — Medication 25 GRAM(S): at 09:26

## 2023-05-16 RX ADMIN — PANTOPRAZOLE SODIUM 40 MILLIGRAM(S): 20 TABLET, DELAYED RELEASE ORAL at 05:22

## 2023-05-16 RX ADMIN — CHLORHEXIDINE GLUCONATE 1 APPLICATION(S): 213 SOLUTION TOPICAL at 05:29

## 2023-05-16 RX ADMIN — PANTOPRAZOLE SODIUM 40 MILLIGRAM(S): 20 TABLET, DELAYED RELEASE ORAL at 17:43

## 2023-05-16 RX ADMIN — Medication 25 MICROGRAM(S): at 09:25

## 2023-05-16 RX ADMIN — Medication 50 MILLIEQUIVALENT(S): at 11:44

## 2023-05-16 RX ADMIN — Medication 50 MILLIGRAM(S): at 05:23

## 2023-05-16 NOTE — CHART NOTE - NSCHARTNOTEFT_GEN_A_CORE
Called by lab for K 2.5    Will give 3 doses KCL 20meq and repeat in AM Called by lab for K 2.5    Will give 3 doses KCL 20meq and repeat in AM    Mg 2.4 - will DC Mag rider

## 2023-05-16 NOTE — SWALLOW BEDSIDE ASSESSMENT ADULT - SLP PERTINENT HISTORY OF CURRENT PROBLEM
scheduled chemotherapy with ICE
A 78 y/o female with pmhx of HTN, CKD 3, hypothyroidism, kidney stone , pre-DM,  arthritis of spine  presents with generalized weakness. PT reports not feeling well for the past week. PT reports not eating much, has appetite, reports eats few bites a day. Pt reports 2 weeks ago had dental infection/abscess had 2 extraction at front was treated with amoxicillin x 1 week. Pt denies difficulty swallowing, PT reports feeling weak today , could not get up. PT usually ambulates with a cane.  PT denies nausea or vomiting. Pt reports palpitation yesterday for few minutes. Pt denies chest pain or shortness of breath. PT reports hx of constipation, last BM yesterday. Pt reports dizziness yesterday systolic 95.  PT reports does not follow up with nephrologist for CKD. PT states has been taking at least 600  mg ibuprofen daily for past 2 weeks for toothache. PT denies fever, chills, cough, burning with urination. Pt lives with her sister needs help at home per son.
Yes

## 2023-05-16 NOTE — PROGRESS NOTE ADULT - SUBJECTIVE AND OBJECTIVE BOX
MEDICINE ATTENDING PROGRESS NOTE  76 y/o female with pmhx of HTN, CKD 3, hypothyroidism, kidney stone , pre-DM,  arthritis of spine  presents with generalized weakness. Found to have elevated troponin on workup on admission. Admitted to tele for further management.    Interval/Overnight events:  - Saw patient with her son  - Briefly, patient states that the primary reason she brought her mom is for safety concern. However, the secondary reason is due to generalized weakness in setting of poor PO intake due to dysphagia. Patient reports worsening dysphagia since December 2022, but has since progressively worst. This resulted in her losing 20-30lb since that time.  As a result, she reports generalized weakness. Patient denies bloody stool or chest pain. She also reports 3 weeks history of dental abscess. She was treated with PO abx and dental extraction. She had couple teeth left to be pulled out. She has been taking NSAID daily x 2 weeks; but last dose couple days ago.  - Today, patient states she feels better since receiving IVF, but appetite is still poor  - Vitals stable; Exam benign  - Labs and imaging reviewed  -  trop peaked at 0.03  - Will get Swallow study if dysphagia is related to oropharyngeal  - If not, will get GI for possible EGD/Colonoscopy  - Will do a trial of megace for poor appeptite  - PT for dispo  - can dc tele tomorrow given trop peaked at 0.03  - Supplement electrolytes    ROS:   General: denies fever, chills, insomnia, depression, weight change  Skin: denies itch, jaundice   Resp: denies cough, pleuritic chest pain, wheezing   CV: denies PND  GI: denies N/V/D constipation   : denies d/c, itching, hematuria, dysuria   EXT: denies pain, swelling, erythema   Musculoskeletal: denies low back pain, joint pain, swelling   Neuro: denies headache, weakness, syncope    MEDICATIONS  (STANDING):  amLODIPine   Tablet 5 milliGRAM(s) Oral every 24 hours  chlorhexidine 2% Cloths 1 Application(s) Topical <User Schedule>  heparin   Injectable 5000 Unit(s) SubCutaneous every 8 hours  levothyroxine 25 MICROGram(s) Oral <User Schedule>  megestrol 20 milliGRAM(s) Oral daily  metoprolol succinate ER 50 milliGRAM(s) Oral daily  pantoprazole    Tablet 40 milliGRAM(s) Oral every 12 hours  sodium chloride 0.9%. 1000 milliLiter(s) (75 mL/Hr) IV Continuous <Continuous>    MEDICATIONS  (PRN):  acetaminophen     Tablet .. 650 milliGRAM(s) Oral every 6 hours PRN Temp greater or equal to 38C (100.4F), Mild Pain (1 - 3)    VITALS  T(F): 97.9 (05-16-23 @ 14:24), Max: 97.9 (05-16-23 @ 14:24)  HR: 62 (05-16-23 @ 14:24) (58 - 62)  BP: 97/54 (05-16-23 @ 14:24) (97/54 - 110/58)  RR: 16 (05-16-23 @ 14:24) (16 - 18)  SpO2: 95% (05-16-23 @ 14:24) (94% - 95%)      PHYSICAL EXAM  Gen- NAD, AAOx3  HEENT- no pallor, anicteric, moist mucous membranes  CVS- S1/S2, no m/r/g  Chest- CTA b/l no w/r/c, no use of accessory muscles, good inspiratory effort, speaking in full sentences  Abd- soft, nt, nd  Ext- no edema, pulses intact b/l  Neuro-CN II-XII intact;    LABS/IMAGING  05-16    143  |  103  |  28<H>  ----------------------------<  92  2.5<LL>   |  25  |  2.0<H>    Ca    9.2      16 May 2023 06:57  Mg     2.4     05-16    TPro  5.5<L>  /  Alb  3.4<L>  /  TBili  0.6  /  DBili  x   /  AST  9   /  ALT  <5  /  AlkPhos  58  05-16    LIVER FUNCTIONS - ( 16 May 2023 06:57 )  Alb: 3.4 g/dL / Pro: 5.5 g/dL / ALK PHOS: 58 U/L / ALT: <5 U/L / AST: 9 U/L / GGT: x                               11.5   8.94  )-----------( 213      ( 16 May 2023 06:57 )             33.0     CARDIAC MARKERS ( 16 May 2023 06:57 )  x     / 0.01 ng/mL / 29 U/L / x     / 1.1 ng/mL  CARDIAC MARKERS ( 15 May 2023 19:36 )  x     / 0.03 ng/mL / 40 U/L / x     / 1.6 ng/mL  CARDIAC MARKERS ( 15 May 2023 10:35 )  x     / 0.04 ng/mL / x     / x     / x          IMAGING  Xray Chest 1 View- PORTABLE-Urgent (05.15.23 @ 10:56)   No radiographic evidence of acute cardiopulmonary disease.    CT Abdomen and Pelvis No Cont (05.15.23 @ 12:51)   LOWER CHEST: Mild bibasilar dependent atelectasis.  HEPATOBILIARY: Scattered calcified hepatic granulomata.  SPLEEN: Few punctate splenic granulomata.  PANCREAS: Unremarkable.  ADRENAL GLANDS: Unremarkable.  KIDNEYS: Several nonobstructing bilateral renal calculi measure up to 8 x 8 x 8 mm within the left renal lower pole (approximately 1500 Hounsfield units.)  ABDOMINOPELVIC NODES: No enlarged abdominal or pelvic lymph nodes.  PELVIC ORGANS: Suggestion of mild endometrial thickening measuring up to 6 mm, poorly evaluated on CT.  PERITONEUM/MESENTERY/BOWEL: Sigmoid colonic diverticulosis, without evidence of acute diverticulitis. No evidence of bowel obstruction. No ascites or free intraperitoneal air. Normal caliber appendix.  BONES/SOFT TISSUES: Degenerative changes of the spine. No acute osseous abnormality.  OTHER: Atherosclerotic vascular calcification.    IMPRESSION:  1. No definite evidence of acute/inflammatory process within the abdomen or pelvis.  2. Suggestion of mild endometrial thickening, which is an unexpected finding in a postmenopausal patient, poorly evaluated on CT; further evaluation with nonemergent dedicated pelvic ultrasound may be of use.    A/P: 76 y/o female with pmhx of HTN, CKD 3, hypothyroidism, kidney stone , pre-DM,  arthritis of spine  presents with generalized weakness. Found to have elevated troponin on workup on admission. Admitted to [facility] for further management.    #generalized weakness due to Poor Po Intake in setting of hypothyroidism and electrolyte disturbances  - f/u TSH, FT4  - cxr negative, ua negative  - gentle IV hydration   - correct electrolytes   - fall precaution   - PT consult     #Electrolyte Dysfunction due to Poor PO intake  - replete mag, phos    #Weight loss and Dysphagia  - Will get GI and Swallow consult    #PAMELA on CKD likely due to NSAID and/or dehydration   - trend cr level  - CT a/p Several nonobstructing bilateral renal calculi measure up to 8 x 8 x 8 mm within the left renal lower pole  - c/w IV hydration   - avoid nephrotoxic meds  - will hold HCTZ-losartan   -will consider nephro consult if cr level does not improve     #elevated troponin due to Demand Ischemia/NSTEMI type 2  - Trop peaked at 0.03  - on tele monitor -> can dc tele  - f/u 2D echo  - cardiology consulted ED    #mild endometrial thickening  -out follow up with GYN for pelvis US     #HTN  -monitor BP level   -will hold HCTZ-Losartan 2/2 pamela   -cw metoprolol   -cw amlodipine     #hypothyroidism   -TSH level   - cw levothyroxine 25 mcg 1x week on Tuesday     #arthritis of spine   -cw tylenol prn   - avoid NSAID given PAMELA    #dental caries  -outpt follow up with  orthodontists    #Incidental Findings  - Imaging reveal: 1. hepatic granulomata. 2. splenic granulomata. 3.  Several nonobstructing bilateral renal calculi measure 4. mild endometrial thickening measuring up to 6 mm  - discussed with patient and the need to follow up with outpatient    #SUPPORTIVE MANAGEMENT/DISPO  - Dispo: pending PT eval  - DVT Ppx:  hep sq  - GI Ppx: PPI  - Diet: regular    Spent over 35 min reviewing chart and on coordinating patient care during interdisciplinary rounds     Hugh Aguilera M.D./[facility]  Attending Physician

## 2023-05-16 NOTE — CONSULT NOTE ADULT - NS ATTEND AMEND GEN_ALL_CORE FT
Patient with wt loss and occassional dysphagia.Had elevated troponins on admission. To consider EGD after cardiac clearance. Colonoscopy can be done as outpatient.
Patient seen and examined. Pertinent labs, imaging and telemetry reviewed. I agree with the above:     Patient has been feeling weak over the last week. She has not been eating much which may be multifactorial with recent dental abscesses and also complains of some dysphagia. She has had palpitations with walking and denies CP, SOB. She may have known history of LBBB but denies any cardiac work up.   EKG with SB and LBBB.   Cr elevated with minimal troponin elevation.   -May represent demand ischemia in setting of CKD.   -No need to trend further.   -Check TTE.   -May need stress as inpatient depending on TTE findings.   -Check lipids, TSH and HA1c.   -S&S consult for dysphagia.   -Hold ACE and continue other meds.     Discussed with patient, son and ACP.

## 2023-05-16 NOTE — CONSULT NOTE ADULT - SUBJECTIVE AND OBJECTIVE BOX
Chief complaint/Reason for consult: weight loss, poor appetite    HPI:  A 78 y/o female with pmhx of HTN, CKD 3, hypothyroidism, kidney stone , pre-DM,  arthritis of spine  presents with generalized weakness. PT reports not feeling well for the past week. PT reports not eating much, has appetite, reports eats few bites a day. Pt reports 2 weeks ago had dental infection/abscess had 2 extraction at front was treated with amoxicillin x 1 week. Pt denies difficulty swallowing, PT reports feeling weak today , could not get up. PT usually ambulates with a cane.  PT denies nausea or vomiting. Pt reports palpitation yesterday for few minutes. Pt denies chest pain or shortness of breath. PT reports hx of constipation, last BM yesterday. Pt reports dizziness yesterday systolic 95.  PT reports does not follow up with nephrologist for CKD. PT states has been taking at least 600  mg ibuprofen daily for past 2 weeks for toothache. PT denies fever, chills, cough, burning with urination. Pt lives with her sister needs help at home per son.  (15 May 2023 14:44)    GI Updates: 77yFemale pmh HTN, CKD 3, hypothyroidism, with recent teeth extraction from dental infection/abscess, patient with poor appetite and weight loss and eating less even prior to this. patient's family reports history of possibly 30lb weight loss in 3 months. Patient has never had a GI workup. Patient denies dysphagia. Currently Patient denies nausea, vomiting, hematemesis, melena, blood in stool, diarrhea, constipation, abdominal pain.      PAST MEDICAL & SURGICAL HISTORY:   HTN (hypertension)      Hypothyroidism      Stage 3 chronic kidney disease      Arthritis      Kidney stone             Family history:  FAMILY HISTORY:    No GI cancers in first or second degree relatives    Social History: No smoking. No alcohol. No illegal drug use.    Allergies:   No Known Allergies    MEDICATIONS: Home Medications:  amLODIPine 5 mg oral tablet: 1 orally once a day (15 May 2023 15:33)  losartan-hydrochlorothiazide 50 mg-12.5 mg oral tablet: 1 orally once a day (15 May 2023 15:33)  metoprolol succinate 50 mg oral capsule, extended release: 1 orally once a day (15 May 2023 15:33)  Synthroid 25 mcg (0.025 mg) oral tablet: 1 orally 2 times a week every Tuesday (15 May 2023 15:33)    MEDICATIONS  (STANDING):  amLODIPine   Tablet 5 milliGRAM(s) Oral every 24 hours  chlorhexidine 2% Cloths 1 Application(s) Topical <User Schedule>  heparin   Injectable 5000 Unit(s) SubCutaneous every 8 hours  levothyroxine 25 MICROGram(s) Oral <User Schedule>  megestrol 20 milliGRAM(s) Oral daily  metoprolol succinate ER 50 milliGRAM(s) Oral daily  pantoprazole    Tablet 40 milliGRAM(s) Oral every 12 hours  potassium chloride  20 mEq/100 mL IVPB 20 milliEquivalent(s) IV Intermittent every 2 hours  sodium chloride 0.9%. 1000 milliLiter(s) (75 mL/Hr) IV Continuous <Continuous>    MEDICATIONS  (PRN):  acetaminophen     Tablet .. 650 milliGRAM(s) Oral every 6 hours PRN Temp greater or equal to 38C (100.4F), Mild Pain (1 - 3)        REVIEW OF SYSTEMS  General:  +weight loss, no fevers, or chills.  Eyes:  No reported pain or visual changes  ENT:  No sore throat or runny nose.  NECK: No stiffness or lymphadenopathy  CV:  No chest pain or palpitations.  Resp:  No shortness of breath, cough, wheezing or hemoptysis  GI:  No abdominal pain, nausea, vomiting, dysphagia, diarrhea or constipation. No rectal bleeding, melena, or hematemesis.  Neuro:  No tingling, numbness       VITALS:   T(F): 97.1 (05-16-23 @ 04:30), Max: 97.1 (05-16-23 @ 04:30)  HR: 58 (05-16-23 @ 04:30) (53 - 60)  BP: 110/58 (05-16-23 @ 04:30) (104/59 - 120/58)  RR: 18 (05-15-23 @ 20:42) (18 - 18)  SpO2: 94% (05-16-23 @ 04:30) (94% - 95%)    PHYSICAL EXAM:  GENERAL: AAOx3, no acute distress.  HEAD:  Atraumatic, Normocephalic  EYES: conjunctiva and sclera clear  NECK: Supple, No thyromegaly   CHEST/LUNG: Clear to auscultation bilaterally; No wheeze, rhonchi, or rales  HEART: Regular rate and rhythm; normal S1, S2, No murmurs.  ABDOMEN: Soft, nontender, nondistended; Bowel sounds present  NEUROLOGY: No asterixis or tremor  SKIN: Intact, no jaundice          LABS:  05-16    143  |  103  |  28<H>  ----------------------------<  92  2.5<LL>   |  25  |  2.0<H>    Ca    9.2      16 May 2023 06:57  Mg     2.4     05-16    TPro  5.5<L>  /  Alb  3.4<L>  /  TBili  0.6  /  DBili  x   /  AST  9   /  ALT  <5  /  AlkPhos  58  05-16                          11.5   8.94  )-----------( 213      ( 16 May 2023 06:57 )             33.0     LIVER FUNCTIONS - ( 16 May 2023 06:57 )  Alb: 3.4 g/dL / Pro: 5.5 g/dL / ALK PHOS: 58 U/L / ALT: <5 U/L / AST: 9 U/L / GGT: x               IMAGING:    < from: CT Abdomen and Pelvis No Cont (05.15.23 @ 12:51) >    ACC: [id number] EXAM:  CT ABDOMEN AND PELVIS   ORDERED BY: JUANITO BRUCE     PROCEDURE DATE:  05/15/2023          INTERPRETATION:  CLINICAL STATEMENT: Nausea.    TECHNIQUE: Contiguous axial CT images were obtained from the lower chest   to the pubic symphysis without intravenous contrast.  Oral contrast was   not administered.  Reformatted images in the coronal and sagittal planes   were acquired.    COMPARISON CT: None.    FINDINGS:    LOWER CHEST: Mild bibasilar dependent atelectasis.    HEPATOBILIARY: Scattered calcified hepatic granulomata.    SPLEEN: Few punctate splenic granulomata.    PANCREAS: Unremarkable.    ADRENAL GLANDS: Unremarkable.    KIDNEYS: Several nonobstructing bilateral renal calculi measure up to 8 x   8 x 8 mm within the left renal lower pole (approximately 1500 Hounsfield   units.)    ABDOMINOPELVIC NODES: No enlarged abdominal or pelvic lymph nodes.    PELVIC ORGANS: Suggestion of mild endometrial thickening measuring up to   6 mm, poorly evaluated on CT.    PERITONEUM/MESENTERY/BOWEL: Sigmoid colonic diverticulosis, without   evidence of acute diverticulitis. No evidence of bowel obstruction. No   ascites or free intraperitoneal air. Normal caliber appendix.    BONES/SOFT TISSUES: Degenerative changes of the spine. No acute osseous   abnormality.    OTHER: Atherosclerotic vascular calcification.      IMPRESSION:    1. No definite evidence of acute/inflammatory process within the abdomen   or pelvis.    2. Suggestion of mild endometrial thickening, which is an unexpected   finding in a postmenopausal patient, poorly evaluated on CT; further   evaluation with nonemergent dedicated pelvic ultrasound may be of use.    --- End of Report ---            CHAN WILKERSON MD; Attending Radiologist  This document has been electronically signed. May 15 2023  1:35PM    < end of copied text >

## 2023-05-16 NOTE — CONSULT NOTE ADULT - ASSESSMENT
77yFemale pmh HTN, CKD 3, hypothyroidism, with recent teeth extraction from dental infection/abscess, patient with poor appetite and weight loss and eating less even prior to this. patient's family reports history of possibly 30lb weight loss in 3 months. Patient has never had a GI workup. Patient denies dysphagia.      Problem 1-Weight loss  poor appetite, occasionally patient with dysphagia  Rec  -can consider EGD after cardiac workup if no improvement  -PPI daily  -The benefits of endoscopy as well as the risks, such as GI bleeding, aspiration pneumonia, perforation, damage or loss of teeth, reaction to medication, or death  were reviewed with the patient.   -speech and swallow eval  -diet as tolerated      Problem 2-CRC screening   Rec  -Follow up with our [facility] located at [address] [place] [address]. Phone Number: [phone] to schedule CRC screening Colonoscopy     Problem 3-Suggestion of mild endometrial thickening, which is an unexpected   finding in a postmenopausal patient, poorly evaluated on CT; further   evaluation with nonemergent dedicated pelvic ultrasound may be of use.  Rec  - Care as per primary team

## 2023-05-16 NOTE — PROGRESS NOTE ADULT - CONVERSATION/DISCUSSION
1. Flonase nasal spray 1 spray each nostril daily for 14 days.    Intrauterine Device Insertion   Patient Instructions    WHAT TO EXPECT AFTER THE PROCEDURE:    Insertion of the IUD may cause some discomfort, such as cramping. The cramping should improve after the IUD is in place. You may have bleeding after the procedure. This is normal. It varies from light spotting for a few days to menstrual-like bleeding.  You may experience irregular bleeding for 3-6 months after IUD is placed and this is normal.  After 6 months, some patients don't have menses at all.  Some patients continue to have menses monthly but they are usually lighter with reduced cramping.  When the IUD is in place, a string will extend past the cervix into the vagina for 1-2 inches. The strings should not bother you or your partner.   HOME CARE INSTRUCTIONS:     1) Ibuprofen 2-3 tabs every 6 hours as needed for pain or cramping.  2) We will notify you of results of Gonorrhea/Chlamydia testing when available.  This test is standard when a IUD is placed.  3) Birth Control: none needed.  4) Schedule a follow up appointment with Dr. Rodrigues in 4-6 weeks.  5) Check to make sure you can feel the strings once a month.  You should schedule an appointment to be seen if you can't the feel strings.  You should have the strings checked by exam with a healthcare provider at least once per year.  6) The  IUD does NOT protect against sexually transmitted diseases.  You should use condoms if you are at risk of nato a sexually transmitted disease.  You should be seen promptly if you develop symptoms or have a known exposure.  The best way to reduce risk of STDs is to have a single monogamous relationship.  7) Mild to moderate bleeding and cramping are normal after placement of IUD.  You should be seen if you are having severe symptoms.  8) Please feel free to call with any questions or concerns.  SEEK MEDICAL CARE IF:     You have bleeding that is heavier or  lasts longer than a normal menstrual cycle.    You have a fever.    You have increasing cramps or abdominal pain not relieved with medicine.    You have abdominal pain that does not seem to be related to the same area of earlier cramping and pain.    You are lightheaded, unusually weak, or faint.    You have abnormal vaginal discharge or smells.    You have pain during sexual intercourse.    You cannot feel the IUD strings, or the IUD string has gotten longer.    You feel the IUD at the opening of the cervix in the vagina.    You think you are pregnant, or you miss your menstrual period.    The IUD string is hurting your sex partner.   Diagnosis/Prognosis/Treatment Options

## 2023-05-17 LAB
A1C WITH ESTIMATED AVERAGE GLUCOSE RESULT: 5.3 % — SIGNIFICANT CHANGE UP (ref 4–5.6)
ALBUMIN SERPL ELPH-MCNC: 3.6 G/DL — SIGNIFICANT CHANGE UP (ref 3.5–5.2)
ALP SERPL-CCNC: 59 U/L — SIGNIFICANT CHANGE UP (ref 30–115)
ALT FLD-CCNC: <5 U/L — SIGNIFICANT CHANGE UP (ref 0–41)
ANION GAP SERPL CALC-SCNC: 14 MMOL/L — SIGNIFICANT CHANGE UP (ref 7–14)
ANION GAP SERPL CALC-SCNC: 15 MMOL/L — HIGH (ref 7–14)
AST SERPL-CCNC: 8 U/L — SIGNIFICANT CHANGE UP (ref 0–41)
BASOPHILS # BLD AUTO: 0.04 K/UL — SIGNIFICANT CHANGE UP (ref 0–0.2)
BASOPHILS NFR BLD AUTO: 0.3 % — SIGNIFICANT CHANGE UP (ref 0–1)
BILIRUB SERPL-MCNC: 0.7 MG/DL — SIGNIFICANT CHANGE UP (ref 0.2–1.2)
BUN SERPL-MCNC: 18 MG/DL — SIGNIFICANT CHANGE UP (ref 10–20)
BUN SERPL-MCNC: 18 MG/DL — SIGNIFICANT CHANGE UP (ref 10–20)
CALCIUM SERPL-MCNC: 9.5 MG/DL — SIGNIFICANT CHANGE UP (ref 8.4–10.5)
CALCIUM SERPL-MCNC: 9.6 MG/DL — SIGNIFICANT CHANGE UP (ref 8.4–10.5)
CHLORIDE SERPL-SCNC: 105 MMOL/L — SIGNIFICANT CHANGE UP (ref 98–110)
CHLORIDE SERPL-SCNC: 106 MMOL/L — SIGNIFICANT CHANGE UP (ref 98–110)
CO2 SERPL-SCNC: 22 MMOL/L — SIGNIFICANT CHANGE UP (ref 17–32)
CO2 SERPL-SCNC: 26 MMOL/L — SIGNIFICANT CHANGE UP (ref 17–32)
CREAT SERPL-MCNC: 1.6 MG/DL — HIGH (ref 0.7–1.5)
CREAT SERPL-MCNC: 1.6 MG/DL — HIGH (ref 0.7–1.5)
EGFR: 33 ML/MIN/1.73M2 — LOW
EGFR: 33 ML/MIN/1.73M2 — LOW
EOSINOPHIL # BLD AUTO: 0.25 K/UL — SIGNIFICANT CHANGE UP (ref 0–0.7)
EOSINOPHIL NFR BLD AUTO: 2.2 % — SIGNIFICANT CHANGE UP (ref 0–8)
ESTIMATED AVERAGE GLUCOSE: 105 MG/DL — SIGNIFICANT CHANGE UP (ref 68–114)
GLUCOSE BLDC GLUCOMTR-MCNC: 119 MG/DL — HIGH (ref 70–99)
GLUCOSE SERPL-MCNC: 130 MG/DL — HIGH (ref 70–99)
GLUCOSE SERPL-MCNC: 98 MG/DL — SIGNIFICANT CHANGE UP (ref 70–99)
HCT VFR BLD CALC: 35.5 % — LOW (ref 37–47)
HGB BLD-MCNC: 12 G/DL — SIGNIFICANT CHANGE UP (ref 12–16)
IMM GRANULOCYTES NFR BLD AUTO: 0.4 % — HIGH (ref 0.1–0.3)
LYMPHOCYTES # BLD AUTO: 19.1 % — LOW (ref 20.5–51.1)
LYMPHOCYTES # BLD AUTO: 2.22 K/UL — SIGNIFICANT CHANGE UP (ref 1.2–3.4)
MAGNESIUM SERPL-MCNC: 2.1 MG/DL — SIGNIFICANT CHANGE UP (ref 1.8–2.4)
MCHC RBC-ENTMCNC: 32.6 PG — HIGH (ref 27–31)
MCHC RBC-ENTMCNC: 33.8 G/DL — SIGNIFICANT CHANGE UP (ref 32–37)
MCV RBC AUTO: 96.5 FL — SIGNIFICANT CHANGE UP (ref 81–99)
MONOCYTES # BLD AUTO: 0.79 K/UL — HIGH (ref 0.1–0.6)
MONOCYTES NFR BLD AUTO: 6.8 % — SIGNIFICANT CHANGE UP (ref 1.7–9.3)
NEUTROPHILS # BLD AUTO: 8.27 K/UL — HIGH (ref 1.4–6.5)
NEUTROPHILS NFR BLD AUTO: 71.2 % — SIGNIFICANT CHANGE UP (ref 42.2–75.2)
NRBC # BLD: 0 /100 WBCS — SIGNIFICANT CHANGE UP (ref 0–0)
PHOSPHATE SERPL-MCNC: 1.8 MG/DL — LOW (ref 2.1–4.9)
PLATELET # BLD AUTO: 230 K/UL — SIGNIFICANT CHANGE UP (ref 130–400)
PMV BLD: 11.1 FL — HIGH (ref 7.4–10.4)
POTASSIUM SERPL-MCNC: 2.9 MMOL/L — LOW (ref 3.5–5)
POTASSIUM SERPL-MCNC: 3.3 MMOL/L — LOW (ref 3.5–5)
POTASSIUM SERPL-SCNC: 2.9 MMOL/L — LOW (ref 3.5–5)
POTASSIUM SERPL-SCNC: 3.3 MMOL/L — LOW (ref 3.5–5)
PROT SERPL-MCNC: 5.7 G/DL — LOW (ref 6–8)
RBC # BLD: 3.68 M/UL — LOW (ref 4.2–5.4)
RBC # FLD: 12.5 % — SIGNIFICANT CHANGE UP (ref 11.5–14.5)
SODIUM SERPL-SCNC: 142 MMOL/L — SIGNIFICANT CHANGE UP (ref 135–146)
SODIUM SERPL-SCNC: 146 MMOL/L — SIGNIFICANT CHANGE UP (ref 135–146)
T4 AB SER-ACNC: 7.6 UG/DL — SIGNIFICANT CHANGE UP (ref 4.6–12)
TSH SERPL-MCNC: 2.85 UIU/ML — SIGNIFICANT CHANGE UP (ref 0.27–4.2)
WBC # BLD: 11.62 K/UL — HIGH (ref 4.8–10.8)
WBC # FLD AUTO: 11.62 K/UL — HIGH (ref 4.8–10.8)

## 2023-05-17 PROCEDURE — 99233 SBSQ HOSP IP/OBS HIGH 50: CPT

## 2023-05-17 PROCEDURE — 93306 TTE W/DOPPLER COMPLETE: CPT | Mod: 26

## 2023-05-17 PROCEDURE — 99232 SBSQ HOSP IP/OBS MODERATE 35: CPT

## 2023-05-17 RX ORDER — POTASSIUM CHLORIDE 20 MEQ
40 PACKET (EA) ORAL ONCE
Refills: 0 | Status: COMPLETED | OUTPATIENT
Start: 2023-05-17 | End: 2023-05-17

## 2023-05-17 RX ORDER — POTASSIUM CHLORIDE 20 MEQ
40 PACKET (EA) ORAL EVERY 4 HOURS
Refills: 0 | Status: DISCONTINUED | OUTPATIENT
Start: 2023-05-17 | End: 2023-05-17

## 2023-05-17 RX ADMIN — PANTOPRAZOLE SODIUM 40 MILLIGRAM(S): 20 TABLET, DELAYED RELEASE ORAL at 06:15

## 2023-05-17 RX ADMIN — Medication 650 MILLIGRAM(S): at 13:05

## 2023-05-17 RX ADMIN — Medication 40 MILLIEQUIVALENT(S): at 21:53

## 2023-05-17 RX ADMIN — MEGESTROL ACETATE 20 MILLIGRAM(S): 40 SUSPENSION ORAL at 12:11

## 2023-05-17 RX ADMIN — PANTOPRAZOLE SODIUM 40 MILLIGRAM(S): 20 TABLET, DELAYED RELEASE ORAL at 18:15

## 2023-05-17 RX ADMIN — CHLORHEXIDINE GLUCONATE 1 APPLICATION(S): 213 SOLUTION TOPICAL at 06:17

## 2023-05-17 RX ADMIN — Medication 650 MILLIGRAM(S): at 12:35

## 2023-05-17 RX ADMIN — Medication 40 MILLIEQUIVALENT(S): at 12:12

## 2023-05-17 RX ADMIN — HEPARIN SODIUM 5000 UNIT(S): 5000 INJECTION INTRAVENOUS; SUBCUTANEOUS at 06:17

## 2023-05-17 RX ADMIN — Medication 50 MILLIGRAM(S): at 06:16

## 2023-05-17 RX ADMIN — HEPARIN SODIUM 5000 UNIT(S): 5000 INJECTION INTRAVENOUS; SUBCUTANEOUS at 21:55

## 2023-05-17 RX ADMIN — HEPARIN SODIUM 5000 UNIT(S): 5000 INJECTION INTRAVENOUS; SUBCUTANEOUS at 14:36

## 2023-05-17 NOTE — PROGRESS NOTE ADULT - ASSESSMENT
Post op gtt instructions reviewed with patient. 77yFemale pmh HTN, CKD 3, hypothyroidism, with recent teeth extraction from dental infection/abscess, patient with poor appetite and weight loss and eating less even prior to this. patient's family reports history of possibly 30lb weight loss in 3 months. Patient has never had a GI workup. Patient denies dysphagia.      Problem 1-Weight loss  poor appetite, occasionally patient with dysphagia  Rec  -patient undergoing ECHO today  -can consider EGD after cardiac workup if no improvement  -PPI daily  -The benefits of endoscopy as well as the risks, such as GI bleeding, aspiration pneumonia, perforation, damage or loss of teeth, reaction to medication, or death  were reviewed with the patient.   -speech and swallow eval  -diet as tolerated      Problem 2-CRC screening   Rec  -Follow up with our [facility] located at [address] [place] [address]. Phone Number: [phone] to schedule CRC screening Colonoscopy     Problem 3-Suggestion of mild endometrial thickening, which is an unexpected   finding in a postmenopausal patient, poorly evaluated on CT; further   evaluation with nonemergent dedicated pelvic ultrasound may be of use.  Rec  - Care as per primary team    77yFemale pmh HTN, CKD 3, hypothyroidism, with recent teeth extraction from dental infection/abscess, patient with poor appetite and weight loss and eating less even prior to this. patient's family reports history of possibly 30lb weight loss in 3 months. Patient has never had a GI workup. Patient denies dysphagia.      Problem 1-Weight loss  poor appetite, occasionally patient with dysphagia  Rec  -patient undergoing ECHO today  -can consider EGD after cardiac workup if no improvement, will await cardiology follow-up and aim for EGD Friday if no improvement in poor appetite   -PPI daily  -The benefits of endoscopy as well as the risks, such as GI bleeding, aspiration pneumonia, perforation, damage or loss of teeth, reaction to medication, or death  were reviewed with the patient.   -speech and swallow eval  -diet as tolerated      Problem 2-CRC screening   Rec  -Follow up with our [facility] located at [address] [place] [address]. Phone Number: [phone] to schedule CRC screening Colonoscopy     Problem 3-Suggestion of mild endometrial thickening, which is an unexpected   finding in a postmenopausal patient, poorly evaluated on CT; further   evaluation with nonemergent dedicated pelvic ultrasound may be of use.  Rec  - Care as per primary team    77yFemale pmh HTN, CKD 3, hypothyroidism, with recent teeth extraction from dental infection/abscess, patient with poor appetite and weight loss and eating less even prior to this. patient's family reports history of possibly 30lb weight loss in 3 months. Patient has never had a GI workup. Patient denies dysphagia.    Problem 1-Weight loss  poor appetite, occasionally patient with dysphagia  Rec  -patient undergoing ECHO today  -can consider EGD after cardiac workup if no improvement, will await cardiology follow-up and aim for EGD Friday if no improvement in poor appetite   -PPI daily  -The benefits of endoscopy as well as the risks, such as GI bleeding, aspiration pneumonia, perforation, damage or loss of teeth, reaction to medication, or death  were reviewed with the patient.   -speech and swallow eval  -diet as tolerated      Problem 2-CRC screening   Rec  -Follow up with our [facility] located at [address] [place] [address]. Phone Number: [phone] to schedule CRC screening Colonoscopy     Problem 3-Suggestion of mild endometrial thickening, which is an unexpected   finding in a postmenopausal patient, poorly evaluated on CT; further   evaluation with nonemergent dedicated pelvic ultrasound may be of use.  Rec  - Care as per primary team

## 2023-05-17 NOTE — PROGRESS NOTE ADULT - SUBJECTIVE AND OBJECTIVE BOX
77yFemale  Being followed for   Interval history:      PAST MEDICAL & SURGICAL HISTORY:          Social History: No smoking. No alcohol. No illegal drug use.          MEDICATIONS:  MEDICATIONS  (STANDING):  amLODIPine   Tablet 5 milliGRAM(s) Oral every 24 hours  chlorhexidine 2% Cloths 1 Application(s) Topical <User Schedule>  heparin   Injectable 5000 Unit(s) SubCutaneous every 8 hours  levothyroxine 25 MICROGram(s) Oral <User Schedule>  megestrol 20 milliGRAM(s) Oral daily  metoprolol succinate ER 50 milliGRAM(s) Oral daily  pantoprazole    Tablet 40 milliGRAM(s) Oral every 12 hours  potassium chloride    Tablet ER 40 milliEquivalent(s) Oral every 4 hours  sodium chloride 0.9%. 1000 milliLiter(s) (75 mL/Hr) IV Continuous <Continuous>    MEDICATIONS  (PRN):  acetaminophen     Tablet .. 650 milliGRAM(s) Oral every 6 hours PRN Temp greater or equal to 38C (100.4F), Mild Pain (1 - 3)      Allergies:      REVIEW OF SYSTEMS:  General:  No weight loss, fevers, or chills.  Eyes:  No reported pain or visual changes  ENT:  No sore throat or runny nose.  NECK: No stiffness   CV:  No chest pain or palpitations.  Resp:  No shortness of breath, cough  GI:  No abdominal pain, nausea, vomiting, dysphagia, diarrhea or constipation. No rectal bleeding, melena, or hematemesis.  Muscle:  No aches or weakness  Neuro:  No tingling, numbness   Heme:  No ecchymosis or easy bruisability        VITAL SIGNS:   T(F): 97.7 (05-17-23 @ 04:00), Max: 97.9 (05-16-23 @ 14:24)  HR: 77 (05-17-23 @ 08:00) (61 - 77)  BP: 105/59 (05-17-23 @ 04:00) (97/54 - 117/56)  RR: 16 (05-17-23 @ 08:00) (16 - 18)  SpO2: 93% (05-17-23 @ 08:00) (93% - 95%)    PHYSICAL EXAM:  GENERAL: AAOx3, no acute distress.  HEAD:  Atraumatic, Normocephalic  EYES: conjunctiva and sclera clear  NECK: Supple, no JVD or thyromegaly  CHEST/LUNG: Clear to auscultation bilaterally; No wheeze, rhonchi, or rales  HEART: Regular rate and rhythm; normal S1, S2, No murmurs.  ABDOMEN: Soft, nontender, nondistended; Bowel sounds present  NEUROLOGY: No asterixis or tremor.   SKIN: Intact, no jaundice            LABS:                        12.0   11.62 )-----------( 230      ( 17 May 2023 06:27 )             35.5     05-17    146  |  105  |  18  ----------------------------<  98  2.9<L>   |  26  |  1.6<H>    Ca    9.5      17 May 2023 06:27  Phos  1.8     05-17  Mg     2.1     05-17    TPro  5.7<L>  /  Alb  3.6  /  TBili  0.7  /  DBili  x   /  AST  8   /  ALT  <5  /  AlkPhos  59  05-17    LIVER FUNCTIONS - ( 17 May 2023 06:27 )  Alb: 3.6 g/dL / Pro: 5.7 g/dL / ALK PHOS: 59 U/L / ALT: <5 U/L / AST: 8 U/L / GGT: x               IMAGING:           77yFemale  Being followed for poor appetite   Interval history: Patient denies nausea, vomiting, hematemesis, melena, blood in stool, diarrhea, constipation, abdominal pain. Patient eating a little bit.      PAST MEDICAL & SURGICAL HISTORY:  HTN (hypertension)      Hypothyroidism      Stage 3 chronic kidney disease      Arthritis      Kidney stone                Social History: No smoking. No alcohol. No illegal drug use.            MEDICATIONS  (STANDING):  amLODIPine   Tablet 5 milliGRAM(s) Oral every 24 hours  chlorhexidine 2% Cloths 1 Application(s) Topical <User Schedule>  heparin   Injectable 5000 Unit(s) SubCutaneous every 8 hours  levothyroxine 25 MICROGram(s) Oral <User Schedule>  megestrol 20 milliGRAM(s) Oral daily  metoprolol succinate ER 50 milliGRAM(s) Oral daily  pantoprazole    Tablet 40 milliGRAM(s) Oral every 12 hours  potassium chloride    Tablet ER 40 milliEquivalent(s) Oral every 4 hours  sodium chloride 0.9%. 1000 milliLiter(s) (75 mL/Hr) IV Continuous <Continuous>    MEDICATIONS  (PRN):  acetaminophen     Tablet .. 650 milliGRAM(s) Oral every 6 hours PRN Temp greater or equal to 38C (100.4F), Mild Pain (1 - 3)      Allergies:  No Known Allergies              REVIEW OF SYSTEMS:  General:  + weight loss, no fevers, or chills.  Eyes:  No reported pain or visual changes  ENT:  No sore throat or runny nose.  NECK: No stiffness   CV:  No chest pain or palpitations.  Resp:  No shortness of breath, cough  GI:  No abdominal pain, nausea, vomiting, dysphagia, diarrhea or constipation. No rectal bleeding, melena, or hematemesis.  Muscle:  No aches or weakness  Neuro:  No tingling, numbness     VITAL SIGNS:   T(F): 97.7 (05-17-23 @ 04:00), Max: 97.9 (05-16-23 @ 14:24)  HR: 77 (05-17-23 @ 08:00) (61 - 77)  BP: 105/59 (05-17-23 @ 04:00) (97/54 - 117/56)  RR: 16 (05-17-23 @ 08:00) (16 - 18)  SpO2: 93% (05-17-23 @ 08:00) (93% - 95%)    PHYSICAL EXAM:  GENERAL: AAOx3, no acute distress.  HEAD:  Atraumatic, Normocephalic  EYES: conjunctiva and sclera clear  NECK: Supple, no JVD or thyromegaly  CHEST/LUNG: Clear to auscultation bilaterally; No wheeze, rhonchi, or rales  HEART: Regular rate and rhythm; normal S1, S2, No murmurs.  ABDOMEN: Soft, nontender, nondistended; Bowel sounds present  NEUROLOGY: No asterixis or tremor.   SKIN: Intact, no jaundice            LABS:                        12.0   11.62 )-----------( 230      ( 17 May 2023 06:27 )             35.5     05-17    146  |  105  |  18  ----------------------------<  98  2.9<L>   |  26  |  1.6<H>    Ca    9.5      17 May 2023 06:27  Phos  1.8     05-17  Mg     2.1     05-17    TPro  5.7<L>  /  Alb  3.6  /  TBili  0.7  /  DBili  x   /  AST  8   /  ALT  <5  /  AlkPhos  59  05-17    LIVER FUNCTIONS - ( 17 May 2023 06:27 )  Alb: 3.6 g/dL / Pro: 5.7 g/dL / ALK PHOS: 59 U/L / ALT: <5 U/L / AST: 8 U/L / GGT: x               IMAGING:    < from: CT Abdomen and Pelvis No Cont (05.15.23 @ 12:51) >    ACC: [id number] EXAM:  CT ABDOMEN AND PELVIS   ORDERED BY: JUANITO BRUCE     PROCEDURE DATE:  05/15/2023          INTERPRETATION:  CLINICAL STATEMENT: Nausea.    TECHNIQUE: Contiguous axial CT images were obtained from the lower chest   to the pubic symphysis without intravenous contrast.  Oral contrast was   not administered.  Reformatted images in the coronal and sagittal planes   were acquired.    COMPARISON CT: None.    FINDINGS:    LOWER CHEST: Mild bibasilar dependent atelectasis.    HEPATOBILIARY: Scattered calcified hepatic granulomata.    SPLEEN: Few punctate splenic granulomata.    PANCREAS: Unremarkable.    ADRENAL GLANDS: Unremarkable.    KIDNEYS: Several nonobstructing bilateral renal calculi measure up to 8 x   8 x 8 mm within the left renal lower pole (approximately 1500 Hounsfield   units.)    ABDOMINOPELVIC NODES: No enlarged abdominal or pelvic lymph nodes.    PELVIC ORGANS: Suggestion of mild endometrial thickening measuring up to   6 mm, poorly evaluated on CT.    PERITONEUM/MESENTERY/BOWEL: Sigmoid colonic diverticulosis, without   evidence of acute diverticulitis. No evidence of bowel obstruction. No   ascites or free intraperitoneal air. Normal caliber appendix.    BONES/SOFT TISSUES: Degenerative changes of the spine. No acute osseous   abnormality.    OTHER: Atherosclerotic vascular calcification.      IMPRESSION:    1. No definite evidence of acute/inflammatory process within the abdomen   or pelvis.    2. Suggestion of mild endometrial thickening, which is an unexpected   finding in a postmenopausal patient, poorly evaluated on CT; further   evaluation with nonemergent dedicated pelvic ultrasound may be of use.    --- End of Report ---            CHAN WILKERSON MD; Attending Radiologist  This document has been electronically signed. May 15 2023  1:35PM    < end of copied text >

## 2023-05-17 NOTE — PROGRESS NOTE ADULT - SUBJECTIVE AND OBJECTIVE BOX
Progress note    INTERVAL HPI/OVERNIGHT EVENTS:    Patient seen and examined at bedside. She is resting comfortably. She states she has L foot pain, worse with movement.      REVIEW OF SYSTEMS:  All other 13 Review of systems were reviewed and are negative    FAMILY HISTORY:    T(C): 36.5 (05-17-23 @ 04:00), Max: 36.6 (05-16-23 @ 14:24)  HR: 77 (05-17-23 @ 08:00) (61 - 77)  BP: 105/59 (05-17-23 @ 04:00) (97/54 - 117/56)  RR: 16 (05-17-23 @ 08:00) (16 - 18)  SpO2: 93% (05-17-23 @ 08:00) (93% - 95%)  Wt(kg): --Vital Signs Last 24 Hrs  T(C): 36.5 (17 May 2023 04:00), Max: 36.6 (16 May 2023 14:24)  T(F): 97.7 (17 May 2023 04:00), Max: 97.9 (16 May 2023 14:24)  HR: 77 (17 May 2023 08:00) (61 - 77)  BP: 105/59 (17 May 2023 04:00) (97/54 - 117/56)  BP(mean): --  RR: 16 (17 May 2023 08:00) (16 - 18)  SpO2: 93% (17 May 2023 08:00) (93% - 95%)    Parameters below as of 17 May 2023 08:00  Patient On (Oxygen Delivery Method): room air      No Known Allergies      PHYSICAL EXAM:  GENERAL: NAD, well-groomed, well-developed  HEAD:  Atraumatic, Normocephalic  EYES: EOMI, PERRLA, conjunctiva and sclera clear  ENMT: No tonsillar erythema, exudates, or enlargement; Moist mucous membranes, Good dentition, No lesions  NECK: Supple, No JVD, Normal thyroid  NERVOUS SYSTEM:  Alert & Oriented X3, Good concentration; Motor Strength 5/5 B/L upper and lower extremities; DTRs 2+ intact and symmetric  CHEST/LUNG: Clear to percussion bilaterally; No rales, rhonchi, wheezing, or rubs  HEART: Regular rate and rhythm; No murmurs, rubs, or gallops  ABDOMEN: Soft, Nontender, Nondistended; Bowel sounds present  EXTREMITIES:  petal pulses present, no ecchymosis or TTP.  LYMPH: No lymphadenopathy noted  SKIN: No rashes or lesions    Consultant(s) Notes Reviewed:  [x ] YES  [ ] NO  Care Discussed with Consultants/Other Providers [ x] YES  [ ] NO    LABS:      RADIOLOGY & ADDITIONAL TESTS:    Imaging Personally Reviewed:  [ ] YES  [ ] NO  acetaminophen     Tablet .. 650 milliGRAM(s) Oral every 6 hours PRN  amLODIPine   Tablet 5 milliGRAM(s) Oral every 24 hours  chlorhexidine 2% Cloths 1 Application(s) Topical <User Schedule>  heparin   Injectable 5000 Unit(s) SubCutaneous every 8 hours  levothyroxine 25 MICROGram(s) Oral <User Schedule>  megestrol 20 milliGRAM(s) Oral daily  metoprolol succinate ER 50 milliGRAM(s) Oral daily  pantoprazole    Tablet 40 milliGRAM(s) Oral every 12 hours  potassium chloride    Tablet ER 40 milliEquivalent(s) Oral every 4 hours  sodium chloride 0.9%. 1000 milliLiter(s) IV Continuous <Continuous>      HEALTH ISSUES - PROBLEM Dx:    A/P: 78 y/o female with pmhx of HTN, CKD 3, hypothyroidism, kidney stone , pre-DM,  arthritis of spine  presents with generalized weakness. Found to have elevated troponin on workup on admission. Admitted to [facility] for further management.    #generalized weakness due to Poor Po Intake in setting of hypothyroidism and electrolyte disturbances  - f/u TSH, FT4  - cxr negative, ua negative  - s/p IV hydration   - correct electrolytes   - fall precaution   - PT consult     #Electrolyte Dysfunction due to Poor PO intake  - replete mag, phos    #Weight loss and Dysphagia  - Speech and swallow evaluation pending  - GI consulted recs appreciated  - Pending cardiac clearance for EGD, TTE pending today    #PAMELA on CKD likely due to NSAID and/or dehydration   - Cr 3.5-->1.6 -->  - CT a/p Several nonobstructing bilateral renal calculi measure up to 8 x 8 x 8 mm within the left renal lower pole  - s/p IV hydration   - avoid nephrotoxic meds  - will hold HCTZ-losartan   - will consider nephro consult if cr level does not improve     #elevated troponin due to Demand Ischemia/NSTEMI type 2  - Trop peaked at 0.03  - f/u 2D echo pending  - cardiology consulted    #mild endometrial thickening  -out follow up with GYN for pelvis US     #HTN  -monitor BP level   -will hold HCTZ-Losartan 2/2 pamela   -cw metoprolol   -cw amlodipine     #hypothyroidism   -TSH level   - cw levothyroxine 25 mcg 1x week on Tuesday     #arthritis of spine   -cw tylenol prn   - avoid NSAID given PAMELA    #dental caries  -outpt follow up with  orthodontists    #Incidental Findings  - Imaging reveal: 1. hepatic granulomata. 2. splenic granulomata. 3.  Several nonobstructing bilateral renal calculi measure 4. mild endometrial thickening measuring up to 6 mm  - discussed with patient and the need to follow up with outpatient    #SUPPORTIVE MANAGEMENT/DISPO  - Dispo: pending PT eval  - DVT Ppx:  hep sq  - GI Ppx: PPI  - Diet: regular      Total time spent to complete patient's bedside assessment, review medical chart, discuss medical plan of care with covering medical team was ____35____ with > 50% of time spent face to face w/ patient, discussion with patient/family and/or coordination of care

## 2023-05-18 LAB
ALBUMIN SERPL ELPH-MCNC: 3.2 G/DL — LOW (ref 3.5–5.2)
ALP SERPL-CCNC: 57 U/L — SIGNIFICANT CHANGE UP (ref 30–115)
ALT FLD-CCNC: <5 U/L — SIGNIFICANT CHANGE UP (ref 0–41)
ANION GAP SERPL CALC-SCNC: 13 MMOL/L — SIGNIFICANT CHANGE UP (ref 7–14)
ANION GAP SERPL CALC-SCNC: 16 MMOL/L — HIGH (ref 7–14)
APTT BLD: 29.2 SEC — SIGNIFICANT CHANGE UP (ref 27–39.2)
AST SERPL-CCNC: 6 U/L — SIGNIFICANT CHANGE UP (ref 0–41)
BASOPHILS # BLD AUTO: 0.06 K/UL — SIGNIFICANT CHANGE UP (ref 0–0.2)
BASOPHILS NFR BLD AUTO: 0.5 % — SIGNIFICANT CHANGE UP (ref 0–1)
BILIRUB SERPL-MCNC: 0.6 MG/DL — SIGNIFICANT CHANGE UP (ref 0.2–1.2)
BUN SERPL-MCNC: 16 MG/DL — SIGNIFICANT CHANGE UP (ref 10–20)
BUN SERPL-MCNC: 17 MG/DL — SIGNIFICANT CHANGE UP (ref 10–20)
CALCIUM SERPL-MCNC: 9.4 MG/DL — SIGNIFICANT CHANGE UP (ref 8.4–10.5)
CALCIUM SERPL-MCNC: 9.9 MG/DL — SIGNIFICANT CHANGE UP (ref 8.4–10.5)
CHLORIDE SERPL-SCNC: 102 MMOL/L — SIGNIFICANT CHANGE UP (ref 98–110)
CHLORIDE SERPL-SCNC: 107 MMOL/L — SIGNIFICANT CHANGE UP (ref 98–110)
CO2 SERPL-SCNC: 21 MMOL/L — SIGNIFICANT CHANGE UP (ref 17–32)
CO2 SERPL-SCNC: 24 MMOL/L — SIGNIFICANT CHANGE UP (ref 17–32)
CREAT SERPL-MCNC: 1.7 MG/DL — HIGH (ref 0.7–1.5)
CREAT SERPL-MCNC: 1.8 MG/DL — HIGH (ref 0.7–1.5)
EGFR: 28 ML/MIN/1.73M2 — LOW
EGFR: 30 ML/MIN/1.73M2 — LOW
EOSINOPHIL # BLD AUTO: 0.25 K/UL — SIGNIFICANT CHANGE UP (ref 0–0.7)
EOSINOPHIL NFR BLD AUTO: 2.1 % — SIGNIFICANT CHANGE UP (ref 0–8)
GLUCOSE BLDC GLUCOMTR-MCNC: 101 MG/DL — HIGH (ref 70–99)
GLUCOSE SERPL-MCNC: 104 MG/DL — HIGH (ref 70–99)
GLUCOSE SERPL-MCNC: 153 MG/DL — HIGH (ref 70–99)
HCT VFR BLD CALC: 32.5 % — LOW (ref 37–47)
HGB BLD-MCNC: 11 G/DL — LOW (ref 12–16)
IMM GRANULOCYTES NFR BLD AUTO: 0.5 % — HIGH (ref 0.1–0.3)
INR BLD: 1.03 RATIO — SIGNIFICANT CHANGE UP (ref 0.65–1.3)
LYMPHOCYTES # BLD AUTO: 2.4 K/UL — SIGNIFICANT CHANGE UP (ref 1.2–3.4)
LYMPHOCYTES # BLD AUTO: 20 % — LOW (ref 20.5–51.1)
MAGNESIUM SERPL-MCNC: 1.6 MG/DL — LOW (ref 1.8–2.4)
MAGNESIUM SERPL-MCNC: 2.4 MG/DL — SIGNIFICANT CHANGE UP (ref 1.8–2.4)
MCHC RBC-ENTMCNC: 33.1 PG — HIGH (ref 27–31)
MCHC RBC-ENTMCNC: 33.8 G/DL — SIGNIFICANT CHANGE UP (ref 32–37)
MCV RBC AUTO: 97.9 FL — SIGNIFICANT CHANGE UP (ref 81–99)
MONOCYTES # BLD AUTO: 0.84 K/UL — HIGH (ref 0.1–0.6)
MONOCYTES NFR BLD AUTO: 7 % — SIGNIFICANT CHANGE UP (ref 1.7–9.3)
NEUTROPHILS # BLD AUTO: 8.39 K/UL — HIGH (ref 1.4–6.5)
NEUTROPHILS NFR BLD AUTO: 69.9 % — SIGNIFICANT CHANGE UP (ref 42.2–75.2)
NRBC # BLD: 0 /100 WBCS — SIGNIFICANT CHANGE UP (ref 0–0)
PHOSPHATE SERPL-MCNC: 1.4 MG/DL — LOW (ref 2.1–4.9)
PLATELET # BLD AUTO: 192 K/UL — SIGNIFICANT CHANGE UP (ref 130–400)
PMV BLD: 11.3 FL — HIGH (ref 7.4–10.4)
POTASSIUM SERPL-MCNC: 3.4 MMOL/L — LOW (ref 3.5–5)
POTASSIUM SERPL-MCNC: 3.8 MMOL/L — SIGNIFICANT CHANGE UP (ref 3.5–5)
POTASSIUM SERPL-SCNC: 3.4 MMOL/L — LOW (ref 3.5–5)
POTASSIUM SERPL-SCNC: 3.8 MMOL/L — SIGNIFICANT CHANGE UP (ref 3.5–5)
PROT SERPL-MCNC: 5.5 G/DL — LOW (ref 6–8)
PROTHROM AB SERPL-ACNC: 11.7 SEC — SIGNIFICANT CHANGE UP (ref 9.95–12.87)
RBC # BLD: 3.32 M/UL — LOW (ref 4.2–5.4)
RBC # FLD: 12.5 % — SIGNIFICANT CHANGE UP (ref 11.5–14.5)
SODIUM SERPL-SCNC: 139 MMOL/L — SIGNIFICANT CHANGE UP (ref 135–146)
SODIUM SERPL-SCNC: 144 MMOL/L — SIGNIFICANT CHANGE UP (ref 135–146)
WBC # BLD: 12 K/UL — HIGH (ref 4.8–10.8)
WBC # FLD AUTO: 12 K/UL — HIGH (ref 4.8–10.8)

## 2023-05-18 PROCEDURE — 99233 SBSQ HOSP IP/OBS HIGH 50: CPT

## 2023-05-18 PROCEDURE — 99232 SBSQ HOSP IP/OBS MODERATE 35: CPT

## 2023-05-18 RX ORDER — LOSARTAN POTASSIUM 100 MG/1
50 TABLET, FILM COATED ORAL DAILY
Refills: 0 | Status: DISCONTINUED | OUTPATIENT
Start: 2023-05-19 | End: 2023-05-19

## 2023-05-18 RX ORDER — MAGNESIUM SULFATE 500 MG/ML
2 VIAL (ML) INJECTION ONCE
Refills: 0 | Status: COMPLETED | OUTPATIENT
Start: 2023-05-18 | End: 2023-05-18

## 2023-05-18 RX ORDER — POTASSIUM PHOSPHATE, MONOBASIC POTASSIUM PHOSPHATE, DIBASIC 236; 224 MG/ML; MG/ML
30 INJECTION, SOLUTION INTRAVENOUS ONCE
Refills: 0 | Status: COMPLETED | OUTPATIENT
Start: 2023-05-18 | End: 2023-05-18

## 2023-05-18 RX ADMIN — POTASSIUM PHOSPHATE, MONOBASIC POTASSIUM PHOSPHATE, DIBASIC 83.33 MILLIMOLE(S): 236; 224 INJECTION, SOLUTION INTRAVENOUS at 11:35

## 2023-05-18 RX ADMIN — Medication 50 MILLIGRAM(S): at 05:28

## 2023-05-18 RX ADMIN — Medication 650 MILLIGRAM(S): at 10:20

## 2023-05-18 RX ADMIN — HEPARIN SODIUM 5000 UNIT(S): 5000 INJECTION INTRAVENOUS; SUBCUTANEOUS at 05:28

## 2023-05-18 RX ADMIN — HEPARIN SODIUM 5000 UNIT(S): 5000 INJECTION INTRAVENOUS; SUBCUTANEOUS at 14:39

## 2023-05-18 RX ADMIN — Medication 25 GRAM(S): at 10:42

## 2023-05-18 RX ADMIN — Medication 650 MILLIGRAM(S): at 10:50

## 2023-05-18 RX ADMIN — MEGESTROL ACETATE 20 MILLIGRAM(S): 40 SUSPENSION ORAL at 11:35

## 2023-05-18 RX ADMIN — CHLORHEXIDINE GLUCONATE 1 APPLICATION(S): 213 SOLUTION TOPICAL at 05:30

## 2023-05-18 RX ADMIN — HEPARIN SODIUM 5000 UNIT(S): 5000 INJECTION INTRAVENOUS; SUBCUTANEOUS at 21:38

## 2023-05-18 RX ADMIN — PANTOPRAZOLE SODIUM 40 MILLIGRAM(S): 20 TABLET, DELAYED RELEASE ORAL at 05:28

## 2023-05-18 RX ADMIN — Medication 650 MILLIGRAM(S): at 22:43

## 2023-05-18 RX ADMIN — Medication 650 MILLIGRAM(S): at 21:38

## 2023-05-18 NOTE — PROGRESS NOTE ADULT - SUBJECTIVE AND OBJECTIVE BOX
Subjective/Objective:   76 y/o female with pmhx of HTN, CKD 3, hypothyroidism, kidney stone , pre-DM,  arthritis of spine  presents with generalized weakness, poor po intake and weight loss awaiting EGD seen for cardiac pre-op eval     Pt seen and examined with son at bedside. Pt currently offers no acute complaints. She never saw a cardiologist in the past, she reports possible LBBB on old ecg but not sure.  Pt with chronic back pain limits ambulation but can walk slowly 2 blocks and climb multiple flights of stairs with no anginal symptoms.     MEDICATIONS  (STANDING):  amLODIPine   Tablet 5 milliGRAM(s) Oral every 24 hours  chlorhexidine 2% Cloths 1 Application(s) Topical <User Schedule>  heparin   Injectable 5000 Unit(s) SubCutaneous every 8 hours  levothyroxine 25 MICROGram(s) Oral <User Schedule>  megestrol 20 milliGRAM(s) Oral daily  metoprolol succinate ER 50 milliGRAM(s) Oral daily    MEDICATIONS  (PRN):  acetaminophen     Tablet .. 650 milliGRAM(s) Oral every 6 hours PRN Temp greater or equal to 38C (100.4F), Mild Pain (1 - 3)          Vital Signs Last 24 Hrs  T(C): 36.9 (18 May 2023 04:49), Max: 36.9 (18 May 2023 04:49)  T(F): 98.5 (18 May 2023 04:49), Max: 98.5 (18 May 2023 04:49)  HR: 69 (18 May 2023 04:49) (69 - 70)  BP: 110/56 (18 May 2023 04:49) (102/61 - 127/60)  BP(mean): --  RR: 16 (18 May 2023 04:49) (16 - 18)  SpO2: 92% (18 May 2023 04:49) (92% - 96%)    Parameters below as of 18 May 2023 04:49  Patient On (Oxygen Delivery Method): room air      I&O's Detail        PHYSICAL EXAM  GEN:  RESP:  CV:  GI:  EXT:  NEURO:  PSYCH:        EKG/ TELEM:    LABS:                          11.0   12.00 )-----------( 192      ( 18 May 2023 06:26 )             32.5       18 May 2023 06:26    144    |  107    |  16     ----------------------------<  104<H>  3.4<L>   |  24     |  1.7<H>  17 May 2023 15:43    142    |  106    |  18     ----------------------------<  130<H>  3.3<L>   |  22     |  1.6<H>    Ca    9.4        18 May 2023 06:26  Ca    9.6        17 May 2023 15:43  Phos  1.4<L>     18 May 2023 06:26  Phos  1.8<L>     17 May 2023 06:27  Mg     1.6<L>     18 May 2023 06:26  Mg     2.1       17 May 2023 06:27    TPro  5.5<L>  /  Alb  3.2<L>  /  TBili  0.6    /  DBili  x      /  AST  6      /  ALT  <5     /  AlkPhos  57     18 May 2023 06:26  TPro  5.7<L>  /  Alb  3.6    /  TBili  0.7    /  DBili  x      /  AST  8      /  ALT  <5     /  AlkPhos  59     17 May 2023 06:27          Creatine Kinase, Serum: 29 U/L (05-16-23 @ 06:57)  Creatine Kinase, Serum: 40 U/L (05-15-23 @ 19:36)              Diagnostic testing:  < from: TTE Echo Complete w/o Contrast w/ Doppler (05.17.23 @ 09:18) >  Summary:   1. Technically suboptimal study.   2. Normal global left ventricular systolic function.   3. LV Ejection Fraction by Kwan's Method with a biplane EF of 70 %.   4. Abnormal septal motion consistent with left bundle branch block.   5. Spectral Doppler shows impaired relaxation pattern of left   ventricular myocardial filling (Grade I diastolic dysfunction).   6. Mild left ventricular hypertrophy.   7. Normal left atrial size.   8. Right atrial size not well visualized.   9. Mild aortic regurgitation.  10. Mild tricuspid regurgitation.  11. No evidence of mitral valve regurgitation.  12. Dilatation of the aortic root.  13. Estimated pulmonary artery systolic pressure is 35.7 mmHg assuming a   right atrial pressure of 3 mmHg, which is consistent withborderline   pulmonary hypertension.  14. There is a significant pericardial fat pad present. Cannot rule out   small effusion.    PHYSICIAN INTERPRETATION:  Left Ventricle: The left ventricular internal cavity size is normal.   There is mild left ventricular hypertrophy. Global LV systolic function   was normal. Normal segmental left ventricular systolic function. Abnormal   (paradoxical) septal motion, consistent with left bundle branch block.   Spectral Doppler shows impaired relaxation pattern of left ventricular   myocardial filling (Grade I diastolic dysfunction).  Right Ventricle: The right ventricle was not well visualized. RV systolic   function is probably normal. TV S' 0.1 m/s.  Left Atrium: Normal left atrial size. LA volume Indexis 11.2 ml/m² ml/m2.  Right Atrium: Right atrial size not well visualized.  Pericardium: There is a significant pericardial fat pad present.  Mitral Valve: No evidence of mitral stenosis. No evidence of mitral valve   regurgitation is seen.  Tricuspid Valve: Structurally normal tricuspid valve, with normal leaflet   excursion. Mild tricuspid regurgitation is visualized. Estimated   pulmonary artery systolic pressure is 35.7 mmHg assuming a right atrial   pressure of 3 mmHg, which is consistent with borderline pulmonary   hypertension.  Aortic Valve: The aortic valve is trileaflet. No evidence of aortic   stenosis. Mild aortic valve regurgitation is seen.  Pulmonic Valve: The pulmonic valve was not well visualized. No indication   of pulmonic valve regurgitation.  Aorta: There is dilatation of the aortic root.  Venous: The inferior vena cava is normal. The inferior vena cava was   normal sized, with respiratory size variation greater than 50%.    < end of copied text >        Assessment and Plan:         Subjective/Objective:   76 y/o female with pmhx of HTN, CKD 3, hypothyroidism, kidney stone , pre-DM,  arthritis of spine  presents with generalized weakness, poor po intake and weight loss awaiting EGD seen for cardiac pre-op eval     Pt seen and examined with son at bedside. Pt currently offers no acute complaints. She never saw a cardiologist in the past, she reports possible LBBB on old ecg but not sure.  Pt with chronic back pain limits ambulation but can walk slowly 2 blocks and climb multiple flights of stairs with no anginal symptoms.     MEDICATIONS  (STANDING):  amLODIPine   Tablet 5 milliGRAM(s) Oral every 24 hours  chlorhexidine 2% Cloths 1 Application(s) Topical <User Schedule>  heparin   Injectable 5000 Unit(s) SubCutaneous every 8 hours  levothyroxine 25 MICROGram(s) Oral <User Schedule>  megestrol 20 milliGRAM(s) Oral daily  metoprolol succinate ER 50 milliGRAM(s) Oral daily    MEDICATIONS  (PRN):  acetaminophen     Tablet .. 650 milliGRAM(s) Oral every 6 hours PRN Temp greater or equal to 38C (100.4F), Mild Pain (1 - 3)          Vital Signs Last 24 Hrs  T(C): 36.9 (18 May 2023 04:49), Max: 36.9 (18 May 2023 04:49)  T(F): 98.5 (18 May 2023 04:49), Max: 98.5 (18 May 2023 04:49)  HR: 69 (18 May 2023 04:49) (69 - 70)  BP: 110/56 (18 May 2023 04:49) (102/61 - 127/60)  BP(mean): --  RR: 16 (18 May 2023 04:49) (16 - 18)  SpO2: 92% (18 May 2023 04:49) (92% - 96%)    Parameters below as of 18 May 2023 04:49  Patient On (Oxygen Delivery Method): room air      I&O's Detail          GENERAL:  78y/o Female NAD, resting comfortably.  HEAD:  Atraumatic, Normocephalic  EYES: EOMI, PERRLA, conjunctiva and sclera clear  NECK: Supple, No JVD, no cervical lymphadenopathy, non-tender  CHEST/LUNG: Clear to auscultation bilaterally; No wheeze, rhonchi, or rales  HEART: Regular rate and rhythm; S1&S2  ABDOMEN: Soft, Nontender, Nondistended x 4 quadrants; Bowel sounds present  EXTREMITIES:   Peripheral Pulses Present, No clubbing, no cyanosis, or no edema, no calf tenderness  PSYCH: AAOx3, cooperative, appropriate  NEUROLOGY: WNL  SKIN: WNL        EKG/ TELEM:    LABS:                          11.0   12.00 )-----------( 192      ( 18 May 2023 06:26 )             32.5       18 May 2023 06:26    144    |  107    |  16     ----------------------------<  104<H>  3.4<L>   |  24     |  1.7<H>  17 May 2023 15:43    142    |  106    |  18     ----------------------------<  130<H>  3.3<L>   |  22     |  1.6<H>    Ca    9.4        18 May 2023 06:26  Ca    9.6        17 May 2023 15:43  Phos  1.4<L>     18 May 2023 06:26  Phos  1.8<L>     17 May 2023 06:27  Mg     1.6<L>     18 May 2023 06:26  Mg     2.1       17 May 2023 06:27    TPro  5.5<L>  /  Alb  3.2<L>  /  TBili  0.6    /  DBili  x      /  AST  6      /  ALT  <5     /  AlkPhos  57     18 May 2023 06:26  TPro  5.7<L>  /  Alb  3.6    /  TBili  0.7    /  DBili  x      /  AST  8      /  ALT  <5     /  AlkPhos  59     17 May 2023 06:27          Creatine Kinase, Serum: 29 U/L (05-16-23 @ 06:57)  Creatine Kinase, Serum: 40 U/L (05-15-23 @ 19:36)              Diagnostic testing:  < from: TTE Echo Complete w/o Contrast w/ Doppler (05.17.23 @ 09:18) >  Summary:   1. Technically suboptimal study.   2. Normal global left ventricular systolic function.   3. LV Ejection Fraction by Kwan's Method with a biplane EF of 70 %.   4. Abnormal septal motion consistent with left bundle branch block.   5. Spectral Doppler shows impaired relaxation pattern of left   ventricular myocardial filling (Grade I diastolic dysfunction).   6. Mild left ventricular hypertrophy.   7. Normal left atrial size.   8. Right atrial size not well visualized.   9. Mild aortic regurgitation.  10. Mild tricuspid regurgitation.  11. No evidence of mitral valve regurgitation.  12. Dilatation of the aortic root.  13. Estimated pulmonary artery systolic pressure is 35.7 mmHg assuming a   right atrial pressure of 3 mmHg, which is consistent withborderline   pulmonary hypertension.  14. There is a significant pericardial fat pad present. Cannot rule out   small effusion.    PHYSICIAN INTERPRETATION:  Left Ventricle: The left ventricular internal cavity size is normal.   There is mild left ventricular hypertrophy. Global LV systolic function   was normal. Normal segmental left ventricular systolic function. Abnormal   (paradoxical) septal motion, consistent with left bundle branch block.   Spectral Doppler shows impaired relaxation pattern of left ventricular   myocardial filling (Grade I diastolic dysfunction).  Right Ventricle: The right ventricle was not well visualized. RV systolic   function is probably normal. TV S' 0.1 m/s.  Left Atrium: Normal left atrial size. LA volume Indexis 11.2 ml/m² ml/m2.  Right Atrium: Right atrial size not well visualized.  Pericardium: There is a significant pericardial fat pad present.  Mitral Valve: No evidence of mitral stenosis. No evidence of mitral valve   regurgitation is seen.  Tricuspid Valve: Structurally normal tricuspid valve, with normal leaflet   excursion. Mild tricuspid regurgitation is visualized. Estimated   pulmonary artery systolic pressure is 35.7 mmHg assuming a right atrial   pressure of 3 mmHg, which is consistent with borderline pulmonary   hypertension.  Aortic Valve: The aortic valve is trileaflet. No evidence of aortic   stenosis. Mild aortic valve regurgitation is seen.  Pulmonic Valve: The pulmonic valve was not well visualized. No indication   of pulmonic valve regurgitation.  Aorta: There is dilatation of the aortic root.  Venous: The inferior vena cava is normal. The inferior vena cava was   normal sized, with respiratory size variation greater than 50%.    < end of copied text >        Assessment and Plan:

## 2023-05-18 NOTE — DIETITIAN INITIAL EVALUATION ADULT - ORAL NUTRITION SUPPLEMENTS
Ensure Compact 2x/day - chocolate flavor (220 kcal, 9 gm Protein each) to optimize kcal and protein intake

## 2023-05-18 NOTE — DIETITIAN INITIAL EVALUATION ADULT - EDUCATION DIETARY MODIFICATIONS
Discussed importance of adequate kcal and protein intake and benefits of oral nutrition supplements/(1) partially meets; needs review/practice/verbalization

## 2023-05-18 NOTE — DIETITIAN INITIAL EVALUATION ADULT - NS FNS REASON FOR WEIGHT CHANG
Unsure of patient's UBW as no previous weight hx noted in patient's chart and patient and son are unsure of patient's UBW Unsure of patient's UBW as no previous weight hx noted in patient's chart  -patient and son are unsure of patient's UBW

## 2023-05-18 NOTE — DIETITIAN INITIAL EVALUATION ADULT - NSICDXPASTMEDICALHX_GEN_ALL_CORE_FT
12
PAST MEDICAL HISTORY:  Arthritis     HTN (hypertension)     Hypothyroidism     Kidney stone     Stage 3 chronic kidney disease

## 2023-05-18 NOTE — DIETITIAN INITIAL EVALUATION ADULT - ADD RECOMMEND
1) Continue current diet order with texture/consistency per SLP   2) Ensure Compact 2x/day   3) Patient noted to be on appetite stimulant - continue encouragement and assistance with all meals, snacks, supplement    Patient is at high nutrition risk, RD to f/u in 3-5 days or PRN

## 2023-05-18 NOTE — DIETITIAN INITIAL EVALUATION ADULT - ORAL INTAKE PTA/DIET HISTORY
Nutrition hx obtained from patient and son present at bedside. Patient usually eats a few bites of food and then suddenly feels that she cannot eat anymore despite being hungry. This has been occurring since December 2022 - patient admits she is a "picky eater" and has specific preferences. Patient drinks more sodas and not a lot of water, but has been attempting to drink more water. She has not been taking a multivitamin since the past few months. Unsure of UBW, but son endorses that she has lost 20-30 lbs. NKFA, no food intolerances noted.    Son notes that during patient's admission her PO intake is gradually getting better ranging from 25-50% depending on what is served. RD discussed different food options and oral nutrition supplements.

## 2023-05-18 NOTE — DIETITIAN INITIAL EVALUATION ADULT - PERTINENT LABORATORY DATA
05-18    144  |  107  |  16  ----------------------------<  104<H>  3.4<L>   |  24  |  1.7<H>    Ca    9.4      18 May 2023 06:26  Phos  1.4     05-18  Mg     1.6     05-18    TPro  5.5<L>  /  Alb  3.2<L>  /  TBili  0.6  /  DBili  x   /  AST  6   /  ALT  <5  /  AlkPhos  57  05-18  POCT Blood Glucose.: 101 mg/dL (05-18-23 @ 07:38)  A1C with Estimated Average Glucose Result: 5.3 % (05-17-23 @ 06:27)

## 2023-05-18 NOTE — DIETITIAN INITIAL EVALUATION ADULT - COLLABORATION WITH OTHER PROVIDERS
Interventions: meals and snacks, medical food supplements, coordination of care  Monitoring/Evaluation: diet order, energy intake, weight, labs, skin status, NFPE

## 2023-05-18 NOTE — PROGRESS NOTE ADULT - ASSESSMENT
76 y/o female with pmhx of HTN, CKD 3, hypothyroidism, kidney stone , pre-DM,  arthritis of spine  presents with generalized weakness, poor po intake and weight loss awaiting EGD seen for cardiac pre-op eval     CE flat  TSH, A1C WNL  ECG: Sinus Christos, LBBB. Unclear if old or new, as no prior ECG's on record  TTE normal LVSF EF 70% paradoxical septal wma c/w LBBB, G1DD    Impression:  # Cardiac pre-op eval for EGD  # mild troponin Elevation in setting of CKD/ possible dehydration  # Hypokalemia - 2.7-->3.4  # Hypomagnesemia - 1.6  # PAMELA  # HTN     Plan  - Pt denies exertional CP and SOB. no CHF on exam  - currently on tele sinus rythm in 60s with aberrancy  - RCRI score 0 points class 1; 30-day risk of NAHID 3.9%  - functional capacity>4 METS  -   -  - replete Lytes keep K >4, Mg>2       78 y/o female with pmhx of HTN, CKD 3, hypothyroidism, kidney stone , pre-DM,  arthritis of spine  presents with generalized weakness, poor po intake and weight loss awaiting EGD seen for cardiac pre-op eval     CE flat  TSH, A1C WNL  ECG: Sinus Christos, LBBB. likely old  TTE normal LVSF EF 70% paradoxical septal wma c/w LBBB, G1DD    Impression:  # Cardiac pre-op eval for EGD  # mild troponin Elevation in setting of CKD/ possible dehydration  # Hypokalemia - 2.7-->3.4  # Hypomagnesemia - 1.6  # PAMELA  # HTN     Plan  - Pt denies exertional CP and SOB. no CHF on exam  - currently on tele sinus rythm in 60s with aberrancy  - RCRI score 0 points class 1; 30-day risk of NAHID 3.9%  - functional capacity>4 METS  - cardiac risk for EGD is intermediate  - outpatient stress test when stable  - replete Lytes keep K >4, Mg>2  - All other workup per primary team

## 2023-05-18 NOTE — PROGRESS NOTE ADULT - ASSESSMENT
77yFemale pmh HTN, CKD 3, hypothyroidism, with recent teeth extraction from dental infection/abscess, patient with poor appetite and weight loss and eating less even prior to this. patient's family reports history of possibly 30lb weight loss in 3 months. Patient has never had a GI workup. Patient denies dysphagia.    Problem 1-Weight loss  poor appetite, occasionally patient with dysphagia  Rec  -await cardiac follow-up aim for EGD tomorrow if patient cleared  -can consider EGD after cardiac workup if no improvement, will await cardiology follow-up and aim for EGD Friday if no improvement in poor appetite   -PPI daily  -The benefits of endoscopy as well as the risks, such as GI bleeding, aspiration pneumonia, perforation, damage or loss of teeth, reaction to medication, or death  were reviewed with the patient.   -speech and swallow eval  -diet as tolerated      Problem 2-CRC screening   Rec  -Follow up with our [facility] located at [address] [place] [address]. Phone Number: [phone] to schedule CRC screening Colonoscopy     Problem 3-Suggestion of mild endometrial thickening, which is an unexpected   finding in a postmenopausal patient, poorly evaluated on CT; further   evaluation with nonemergent dedicated pelvic ultrasound may be of use.  Rec  - Care as per primary team      77yFemale pmh HTN, CKD 3, hypothyroidism, with recent teeth extraction from dental infection/abscess, patient with poor appetite and weight loss and eating less even prior to this. patient's family reports history of possibly 30lb weight loss in 3 months. Patient has never had a GI workup. Patient denies dysphagia.    Problem 1-Weight loss  poor appetite, occasionally patient with dysphagia  Rec  -await cardiac follow-up aim for EGD tomorrow if patient cleared from cardiac standpoint   -PPI daily  -The benefits of endoscopy as well as the risks, such as GI bleeding, aspiration pneumonia, perforation, damage or loss of teeth, reaction to medication, or death  were reviewed with the patient.   -diet as tolerated      Problem 2-CRC screening   Rec  -Follow up with our [facility] located at [address] [place] [address]. Phone Number: [phone] to schedule CRC screening Colonoscopy     Problem 3-Suggestion of mild endometrial thickening, which is an unexpected   finding in a postmenopausal patient, poorly evaluated on CT; further   evaluation with nonemergent dedicated pelvic ultrasound may be of use.  Rec  - Care as per primary team      77yFemale pmh HTN, CKD 3, hypothyroidism, with recent teeth extraction from dental infection/abscess, patient with poor appetite and weight loss and eating less even prior to this. patient's family reports history of possibly 30lb weight loss in 3 months. Patient has never had a GI workup. Patient denies dysphagia.    Problem 1-Weight loss  poor appetite, occasionally patient with dysphagia  Rec  -await cardiac follow-up aim for EGD tomorrow if patient cleared from cardiac standpoint   -npo aftermidnight   -PPI daily  -The benefits of endoscopy as well as the risks, such as GI bleeding, aspiration pneumonia, perforation, damage or loss of teeth, reaction to medication, or death  were reviewed with the patient.   -diet as tolerated      Problem 2-CRC screening   Rec  -Follow up with our [facility] located at [address] [place] [address]. Phone Number: [phone] to schedule CRC screening Colonoscopy     Problem 3-Suggestion of mild endometrial thickening, which is an unexpected   finding in a postmenopausal patient, poorly evaluated on CT; further   evaluation with nonemergent dedicated pelvic ultrasound may be of use.  Rec  - Care as per primary team

## 2023-05-18 NOTE — DIETITIAN INITIAL EVALUATION ADULT - NS FNS DIET ORDER
Diet, NPO after Midnight:      NPO Start Date: 18-May-2023,   NPO Start Time: 23:59 (05-18-23 @ 15:27) [Active]  Diet, DASH/TLC:   Sodium & Cholesterol Restricted (05-15-23 @ 15:31) [Active]

## 2023-05-18 NOTE — DIETITIAN INITIAL EVALUATION ADULT - PERTINENT MEDS FT
MEDICATIONS  (STANDING):  amLODIPine   Tablet 5 milliGRAM(s) Oral every 24 hours  chlorhexidine 2% Cloths 1 Application(s) Topical <User Schedule>  heparin   Injectable 5000 Unit(s) SubCutaneous every 8 hours  levothyroxine 25 MICROGram(s) Oral <User Schedule>  megestrol 20 milliGRAM(s) Oral daily  metoprolol succinate ER 50 milliGRAM(s) Oral daily    MEDICATIONS  (PRN):  acetaminophen     Tablet .. 650 milliGRAM(s) Oral every 6 hours PRN Temp greater or equal to 38C (100.4F), Mild Pain (1 - 3)

## 2023-05-18 NOTE — PROGRESS NOTE ADULT - SUBJECTIVE AND OBJECTIVE BOX
Progress note    INTERVAL HPI/OVERNIGHT EVENTS:    Patient seen and examined at bedside. Son at bedside. She is sitting in a chair without any complaints.      REVIEW OF SYSTEMS:  All other 13 Review of systems were reviewed and are negative    FAMILY HISTORY:    T(C): 36.9 (05-18-23 @ 04:49), Max: 36.9 (05-18-23 @ 04:49)  HR: 69 (05-18-23 @ 04:49) (69 - 70)  BP: 110/56 (05-18-23 @ 04:49) (102/61 - 127/60)  RR: 16 (05-18-23 @ 04:49) (16 - 18)  SpO2: 92% (05-18-23 @ 04:49) (92% - 96%)  Wt(kg): --Vital Signs Last 24 Hrs  T(C): 36.9 (18 May 2023 04:49), Max: 36.9 (18 May 2023 04:49)  T(F): 98.5 (18 May 2023 04:49), Max: 98.5 (18 May 2023 04:49)  HR: 69 (18 May 2023 04:49) (69 - 70)  BP: 110/56 (18 May 2023 04:49) (102/61 - 127/60)  BP(mean): --  RR: 16 (18 May 2023 04:49) (16 - 18)  SpO2: 92% (18 May 2023 04:49) (92% - 96%)    Parameters below as of 18 May 2023 04:49  Patient On (Oxygen Delivery Method): room air      No Known Allergies      PHYSICAL EXAM:  GENERAL: NAD, well-groomed, well-developed  HEAD:  Atraumatic, Normocephalic  EYES: EOMI, PERRLA, conjunctiva and sclera clear  ENMT: No tonsillar erythema, exudates, or enlargement; Moist mucous membranes, Good dentition, No lesions  NECK: Supple, No JVD, Normal thyroid  NERVOUS SYSTEM:  Alert & Oriented X3, Good concentration; Motor Strength 5/5 B/L upper and lower extremities; DTRs 2+ intact and symmetric  CHEST/LUNG: Clear to percussion bilaterally; No rales, rhonchi, wheezing, or rubs  HEART: Regular rate and rhythm; No murmurs, rubs, or gallops  ABDOMEN: Soft, Nontender, Nondistended; Bowel sounds present  EXTREMITIES:  2+ Peripheral Pulses, No clubbing, cyanosis, or edema  LYMPH: No lymphadenopathy noted  SKIN: No rashes or lesions    Consultant(s) Notes Reviewed:  [x ] YES  [ ] NO  Care Discussed with Consultants/Other Providers [ x] YES  [ ] NO    LABS:      RADIOLOGY & ADDITIONAL TESTS:    Imaging Personally Reviewed:  [ ] YES  [ ] NO  acetaminophen     Tablet .. 650 milliGRAM(s) Oral every 6 hours PRN  amLODIPine   Tablet 5 milliGRAM(s) Oral every 24 hours  chlorhexidine 2% Cloths 1 Application(s) Topical <User Schedule>  heparin   Injectable 5000 Unit(s) SubCutaneous every 8 hours  levothyroxine 25 MICROGram(s) Oral <User Schedule>  megestrol 20 milliGRAM(s) Oral daily  metoprolol succinate ER 50 milliGRAM(s) Oral daily  pantoprazole    Tablet 40 milliGRAM(s) Oral every 12 hours      HEALTH ISSUES - PROBLEM Dx:    A/P: 78 y/o female with pmhx of HTN, CKD 3, hypothyroidism, kidney stone , pre-DM,  arthritis of spine  presents with generalized weakness. Found to have elevated troponin on workup on admission. Admitted to [facility] for further management.    #Generalized weakness d/t poor PO Intake and electrolyte disturbances  #Weight loss and Dysphagia  - TSH wnl, FT4 pending  - cxr negative, ua negative  - s/p IV hydration  - Speech and swallow evaluation recs appreciated  - GI consulted recs appreciated  - Pending cardiac clearance for EGD  - TTE LVEF 70%, abnormal septal motion c/w LBBB, Grade I diastolic dysfunction, borderline PHTN. Small pericardial fat pad present. Other mild findings (refer to complete report)  - Hold PPI d/t hypomagnesemia    #PAMELA on CKDIIIb  - Cr 3.5-->1.6 --> baseline  - CT a/p Several nonobstructing bilateral renal calculi measure up to 8 x 8 x 8 mm within the left renal lower pole  - s/p IV hydration   - avoid nephrotoxic meds  - holding HCTZ-losartan, resume losartan 50mg    #elevated troponin due to Demand Ischemia/NSTEMI type 2  - Trop peaked at 0.03  - 2D echo as above  - cardiology consulted    #Reactive leukocytosis  -WBC slowly trending up  -No Hx of steroids use, no urinary symptoms or coughing  -CXR and UA (-). If trend continues, repeat infectious w/u    #mild endometrial thickening  -out follow up with GYN for pelvis US     #HTN  -monitor BP level   -HCTZ-Losartan on hold, resume losartan  -cw metoprolol   -cw amlodipine     #hypothyroidism   -TSH level   - cw levothyroxine 25 mcg 1x week on Tuesday     #arthritis of spine   -cw tylenol prn   - avoid NSAID given PAMELA    #dental caries  -outpt follow up with  orthodontists    #Incidental Findings  - Imaging reveal: 1. hepatic granulomata. 2. splenic granulomata. 3.  Several nonobstructing bilateral renal calculi measure 4. mild endometrial thickening measuring up to 6 mm  - discussed with patient and the need to follow up with outpatient    #SUPPORTIVE MANAGEMENT/DISPO  - Dispo: pending PT eval  - DVT Ppx:  hep sq  - GI Ppx: PPI  - Diet: regular    D/w GI and cardiology  Labs reviewed

## 2023-05-18 NOTE — DIETITIAN INITIAL EVALUATION ADULT - FACTORS AFF FOOD INTAKE
patient reports decreased PO intake related to suddenly not being able to eat anymore/other (specify)

## 2023-05-18 NOTE — DIETITIAN INITIAL EVALUATION ADULT - OTHER INFO
Patient is 76 yo female with PMHx of HTN, CKD 3, hypothyroidism, kidney stone, pre-DM, arthritis of spine presents with generalized weakness. Found to have elevated troponin on workup on admission. Admitted to [facility] for further management. Patient with generalized weakness d/t poor PO intake and electrolyte disturbances; Weight loss and Dysphagia - SLP evaluation on 5/16: recommended regular texture and thin liquids; PAMELA on CKD 3lllb - CT a/p Several nonobstructing bilateral renal calculi measure up to 8 x 8 x 8 mm within the left renal lower pole - IV hydration;

## 2023-05-18 NOTE — DIETITIAN INITIAL EVALUATION ADULT - NSPROEDAREADYLEARNOTH_GEN_A_NUR
No new issues at this time, wound care provided per MD orders. Supplies ordered on this date.       CP assess  wound care and assess  woundcare education  assess pain  assess falls
none

## 2023-05-18 NOTE — PROGRESS NOTE ADULT - SUBJECTIVE AND OBJECTIVE BOX
77yFemale  Being followed for   Interval history:      PAST MEDICAL & SURGICAL HISTORY:          Social History: No smoking. No alcohol. No illegal drug use.          MEDICATIONS:  MEDICATIONS  (STANDING):  amLODIPine   Tablet 5 milliGRAM(s) Oral every 24 hours  chlorhexidine 2% Cloths 1 Application(s) Topical <User Schedule>  heparin   Injectable 5000 Unit(s) SubCutaneous every 8 hours  levothyroxine 25 MICROGram(s) Oral <User Schedule>  megestrol 20 milliGRAM(s) Oral daily  metoprolol succinate ER 50 milliGRAM(s) Oral daily  pantoprazole    Tablet 40 milliGRAM(s) Oral every 12 hours  potassium phosphate IVPB 30 milliMole(s) IV Intermittent once    MEDICATIONS  (PRN):  acetaminophen     Tablet .. 650 milliGRAM(s) Oral every 6 hours PRN Temp greater or equal to 38C (100.4F), Mild Pain (1 - 3)      Allergies:      REVIEW OF SYSTEMS:  General:  No weight loss, fevers, or chills.  Eyes:  No reported pain or visual changes  ENT:  No sore throat or runny nose.  NECK: No stiffness   CV:  No chest pain or palpitations.  Resp:  No shortness of breath, cough  GI:  No abdominal pain, nausea, vomiting, dysphagia, diarrhea or constipation. No rectal bleeding, melena, or hematemesis.  Muscle:  No aches or weakness  Neuro:  No tingling, numbness   Heme:  No ecchymosis or easy bruisability        VITAL SIGNS:   T(F): 98.5 (05-18-23 @ 04:49), Max: 98.5 (05-18-23 @ 04:49)  HR: 69 (05-18-23 @ 04:49) (69 - 70)  BP: 110/56 (05-18-23 @ 04:49) (102/61 - 127/60)  RR: 16 (05-18-23 @ 04:49) (16 - 18)  SpO2: 92% (05-18-23 @ 04:49) (92% - 96%)    PHYSICAL EXAM:  GENERAL: AAOx3, no acute distress.  HEAD:  Atraumatic, Normocephalic  EYES: conjunctiva and sclera clear  NECK: Supple, no JVD or thyromegaly  CHEST/LUNG: Clear to auscultation bilaterally; No wheeze, rhonchi, or rales  HEART: Regular rate and rhythm; normal S1, S2, No murmurs.  ABDOMEN: Soft, nontender, nondistended; Bowel sounds present  NEUROLOGY: No asterixis or tremor.   SKIN: Intact, no jaundice            LABS:                        11.0   12.00 )-----------( 192      ( 18 May 2023 06:26 )             32.5     05-18    144  |  107  |  16  ----------------------------<  104<H>  3.4<L>   |  24  |  1.7<H>    Ca    9.4      18 May 2023 06:26  Phos  1.4     05-18  Mg     1.6     05-18    TPro  5.5<L>  /  Alb  3.2<L>  /  TBili  0.6  /  DBili  x   /  AST  6   /  ALT  <5  /  AlkPhos  57  05-18    LIVER FUNCTIONS - ( 18 May 2023 06:26 )  Alb: 3.2 g/dL / Pro: 5.5 g/dL / ALK PHOS: 57 U/L / ALT: <5 U/L / AST: 6 U/L / GGT: x               IMAGING:           77yFemale  Being followed for poor appetite, weight loss   Interval history: patient eating intermittently, awaiting EGD.      PAST MEDICAL & SURGICAL HISTORY:  HTN (hypertension)      Hypothyroidism      Stage 3 chronic kidney disease      Arthritis      Kidney stone                Social History: No smoking. No alcohol. No illegal drug use.            MEDICATIONS  (STANDING):  amLODIPine   Tablet 5 milliGRAM(s) Oral every 24 hours  chlorhexidine 2% Cloths 1 Application(s) Topical <User Schedule>  heparin   Injectable 5000 Unit(s) SubCutaneous every 8 hours  levothyroxine 25 MICROGram(s) Oral <User Schedule>  megestrol 20 milliGRAM(s) Oral daily  metoprolol succinate ER 50 milliGRAM(s) Oral daily  pantoprazole    Tablet 40 milliGRAM(s) Oral every 12 hours  potassium phosphate IVPB 30 milliMole(s) IV Intermittent once    MEDICATIONS  (PRN):  acetaminophen     Tablet .. 650 milliGRAM(s) Oral every 6 hours PRN Temp greater or equal to 38C (100.4F), Mild Pain (1 - 3)      Allergies:   No Known Allergies            REVIEW OF SYSTEMS:  General:  + weight loss, no fevers, or chills.  Eyes:  No reported pain or visual changes  ENT:  No sore throat or runny nose.  NECK: No stiffness   CV:  No chest pain or palpitations.  Resp:  No shortness of breath, cough  GI:  No abdominal pain, nausea, vomiting, dysphagia, diarrhea or constipation. No rectal bleeding, melena, or hematemesis.  Neuro:  No tingling, numbness         VITAL SIGNS:   T(F): 98.5 (05-18-23 @ 04:49), Max: 98.5 (05-18-23 @ 04:49)  HR: 69 (05-18-23 @ 04:49) (69 - 70)  BP: 110/56 (05-18-23 @ 04:49) (102/61 - 127/60)  RR: 16 (05-18-23 @ 04:49) (16 - 18)  SpO2: 92% (05-18-23 @ 04:49) (92% - 96%)    PHYSICAL EXAM:  GENERAL: alert and oriented x3  HEAD:  Atraumatic, Normocephalic  EYES: conjunctiva and sclera clear  NECK: Supple, no JVD or thyromegaly  CHEST/LUNG: Clear to auscultation bilaterally; No wheeze, rhonchi, or rales  HEART: Regular rate and rhythm; normal S1, S2, No murmurs.  ABDOMEN: Soft, nontender, nondistended; Bowel sounds present  NEUROLOGY: No asterixis or tremor.   SKIN: Intact, no jaundice            LABS:                        11.0   12.00 )-----------( 192      ( 18 May 2023 06:26 )             32.5     05-18    144  |  107  |  16  ----------------------------<  104<H>  3.4<L>   |  24  |  1.7<H>    Ca    9.4      18 May 2023 06:26  Phos  1.4     05-18  Mg     1.6     05-18    TPro  5.5<L>  /  Alb  3.2<L>  /  TBili  0.6  /  DBili  x   /  AST  6   /  ALT  <5  /  AlkPhos  57  05-18    LIVER FUNCTIONS - ( 18 May 2023 06:26 )  Alb: 3.2 g/dL / Pro: 5.5 g/dL / ALK PHOS: 57 U/L / ALT: <5 U/L / AST: 6 U/L / GGT: x               IMAGING:    < from: CT Abdomen and Pelvis No Cont (05.15.23 @ 12:51) >    ACC: [id number] EXAM:  CT ABDOMEN AND PELVIS   ORDERED BY: JUANITO BRUCE     PROCEDURE DATE:  05/15/2023          INTERPRETATION:  CLINICAL STATEMENT: Nausea.    TECHNIQUE: Contiguous axial CT images were obtained from the lower chest   to the pubic symphysis without intravenous contrast.  Oral contrast was   not administered.  Reformatted images in the coronal and sagittal planes   were acquired.    COMPARISON CT: None.    FINDINGS:    LOWER CHEST: Mild bibasilar dependent atelectasis.    HEPATOBILIARY: Scattered calcified hepatic granulomata.    SPLEEN: Few punctate splenic granulomata.    PANCREAS: Unremarkable.    ADRENAL GLANDS: Unremarkable.    KIDNEYS: Several nonobstructing bilateral renal calculi measure up to 8 x   8 x 8 mm within the left renal lower pole (approximately 1500 Hounsfield   units.)    ABDOMINOPELVIC NODES: No enlarged abdominal or pelvic lymph nodes.    PELVIC ORGANS: Suggestion of mild endometrial thickening measuring up to   6 mm, poorly evaluated on CT.    PERITONEUM/MESENTERY/BOWEL: Sigmoid colonic diverticulosis, without   evidence of acute diverticulitis. No evidence of bowel obstruction. No   ascites or free intraperitoneal air. Normal caliber appendix.    BONES/SOFT TISSUES: Degenerative changes of the spine. No acute osseous   abnormality.    OTHER: Atherosclerotic vascular calcification.      IMPRESSION:    1. No definite evidence of acute/inflammatory process within the abdomen   or pelvis.    2. Suggestion of mild endometrial thickening, which is an unexpected   finding in a postmenopausal patient, poorly evaluated on CT; further   evaluation with nonemergent dedicated pelvic ultrasound may be of use.    --- End of Report ---            CHAN WILKERSON MD; Attending Radiologist  This document has been electronically signed. May 15 2023  1:35PM    < end of copied text >

## 2023-05-19 LAB
ANION GAP SERPL CALC-SCNC: 11 MMOL/L — SIGNIFICANT CHANGE UP (ref 7–14)
APTT BLD: 26.8 SEC — LOW (ref 27–39.2)
BUN SERPL-MCNC: 18 MG/DL — SIGNIFICANT CHANGE UP (ref 10–20)
CALCIUM SERPL-MCNC: 9.3 MG/DL — SIGNIFICANT CHANGE UP (ref 8.4–10.5)
CHLORIDE SERPL-SCNC: 105 MMOL/L — SIGNIFICANT CHANGE UP (ref 98–110)
CO2 SERPL-SCNC: 24 MMOL/L — SIGNIFICANT CHANGE UP (ref 17–32)
CREAT SERPL-MCNC: 1.7 MG/DL — HIGH (ref 0.7–1.5)
EGFR: 30 ML/MIN/1.73M2 — LOW
GLUCOSE SERPL-MCNC: 103 MG/DL — HIGH (ref 70–99)
HCT VFR BLD CALC: 35.6 % — LOW (ref 37–47)
HGB BLD-MCNC: 11.9 G/DL — LOW (ref 12–16)
INR BLD: 1 RATIO — SIGNIFICANT CHANGE UP (ref 0.65–1.3)
MAGNESIUM SERPL-MCNC: 1.8 MG/DL — SIGNIFICANT CHANGE UP (ref 1.8–2.4)
MCHC RBC-ENTMCNC: 32.8 PG — HIGH (ref 27–31)
MCHC RBC-ENTMCNC: 33.4 G/DL — SIGNIFICANT CHANGE UP (ref 32–37)
MCV RBC AUTO: 98.1 FL — SIGNIFICANT CHANGE UP (ref 81–99)
NRBC # BLD: 0 /100 WBCS — SIGNIFICANT CHANGE UP (ref 0–0)
PLATELET # BLD AUTO: 201 K/UL — SIGNIFICANT CHANGE UP (ref 130–400)
PMV BLD: 12 FL — HIGH (ref 7.4–10.4)
POTASSIUM SERPL-MCNC: 3.8 MMOL/L — SIGNIFICANT CHANGE UP (ref 3.5–5)
POTASSIUM SERPL-SCNC: 3.8 MMOL/L — SIGNIFICANT CHANGE UP (ref 3.5–5)
PROTHROM AB SERPL-ACNC: 11.4 SEC — SIGNIFICANT CHANGE UP (ref 9.95–12.87)
RBC # BLD: 3.63 M/UL — LOW (ref 4.2–5.4)
RBC # FLD: 12.2 % — SIGNIFICANT CHANGE UP (ref 11.5–14.5)
SARS-COV-2 RNA SPEC QL NAA+PROBE: SIGNIFICANT CHANGE UP
SODIUM SERPL-SCNC: 140 MMOL/L — SIGNIFICANT CHANGE UP (ref 135–146)
T4 FREE SERPL-MCNC: 1.4 NG/DL — SIGNIFICANT CHANGE UP (ref 0.9–1.8)
WBC # BLD: 10.59 K/UL — SIGNIFICANT CHANGE UP (ref 4.8–10.8)
WBC # FLD AUTO: 10.59 K/UL — SIGNIFICANT CHANGE UP (ref 4.8–10.8)

## 2023-05-19 PROCEDURE — 88312 SPECIAL STAINS GROUP 1: CPT | Mod: 26

## 2023-05-19 PROCEDURE — 88305 TISSUE EXAM BY PATHOLOGIST: CPT | Mod: 26

## 2023-05-19 PROCEDURE — 43239 EGD BIOPSY SINGLE/MULTIPLE: CPT

## 2023-05-19 PROCEDURE — 99233 SBSQ HOSP IP/OBS HIGH 50: CPT

## 2023-05-19 RX ORDER — CHLORHEXIDINE GLUCONATE 213 G/1000ML
1 SOLUTION TOPICAL
Refills: 0 | Status: DISCONTINUED | OUTPATIENT
Start: 2023-05-19 | End: 2023-05-20

## 2023-05-19 RX ORDER — ACETAMINOPHEN 500 MG
650 TABLET ORAL EVERY 6 HOURS
Refills: 0 | Status: DISCONTINUED | OUTPATIENT
Start: 2023-05-19 | End: 2023-05-20

## 2023-05-19 RX ORDER — LOSARTAN POTASSIUM 100 MG/1
50 TABLET, FILM COATED ORAL DAILY
Refills: 0 | Status: DISCONTINUED | OUTPATIENT
Start: 2023-05-19 | End: 2023-05-20

## 2023-05-19 RX ORDER — MEGESTROL ACETATE 40 MG/ML
20 SUSPENSION ORAL DAILY
Refills: 0 | Status: DISCONTINUED | OUTPATIENT
Start: 2023-05-19 | End: 2023-05-20

## 2023-05-19 RX ORDER — HEPARIN SODIUM 5000 [USP'U]/ML
5000 INJECTION INTRAVENOUS; SUBCUTANEOUS EVERY 8 HOURS
Refills: 0 | Status: DISCONTINUED | OUTPATIENT
Start: 2023-05-19 | End: 2023-05-20

## 2023-05-19 RX ORDER — SODIUM CHLORIDE 9 MG/ML
1000 INJECTION, SOLUTION INTRAVENOUS
Refills: 0 | Status: DISCONTINUED | OUTPATIENT
Start: 2023-05-19 | End: 2023-05-19

## 2023-05-19 RX ORDER — LEVOTHYROXINE SODIUM 125 MCG
25 TABLET ORAL
Refills: 0 | Status: DISCONTINUED | OUTPATIENT
Start: 2023-05-19 | End: 2023-05-20

## 2023-05-19 RX ORDER — MAGNESIUM OXIDE 400 MG ORAL TABLET 241.3 MG
400 TABLET ORAL DAILY
Refills: 0 | Status: DISCONTINUED | OUTPATIENT
Start: 2023-05-19 | End: 2023-05-19

## 2023-05-19 RX ORDER — METOPROLOL TARTRATE 50 MG
50 TABLET ORAL DAILY
Refills: 0 | Status: DISCONTINUED | OUTPATIENT
Start: 2023-05-19 | End: 2023-05-20

## 2023-05-19 RX ORDER — PANTOPRAZOLE SODIUM 20 MG/1
40 TABLET, DELAYED RELEASE ORAL
Refills: 0 | Status: DISCONTINUED | OUTPATIENT
Start: 2023-05-19 | End: 2023-05-20

## 2023-05-19 RX ORDER — AMLODIPINE BESYLATE 2.5 MG/1
5 TABLET ORAL EVERY 24 HOURS
Refills: 0 | Status: DISCONTINUED | OUTPATIENT
Start: 2023-05-19 | End: 2023-05-20

## 2023-05-19 RX ORDER — ONDANSETRON 8 MG/1
4 TABLET, FILM COATED ORAL ONCE
Refills: 0 | Status: DISCONTINUED | OUTPATIENT
Start: 2023-05-19 | End: 2023-05-19

## 2023-05-19 RX ORDER — MAGNESIUM OXIDE 400 MG ORAL TABLET 241.3 MG
400 TABLET ORAL DAILY
Refills: 0 | Status: DISCONTINUED | OUTPATIENT
Start: 2023-05-19 | End: 2023-05-20

## 2023-05-19 RX ADMIN — LOSARTAN POTASSIUM 50 MILLIGRAM(S): 100 TABLET, FILM COATED ORAL at 05:59

## 2023-05-19 RX ADMIN — CHLORHEXIDINE GLUCONATE 1 APPLICATION(S): 213 SOLUTION TOPICAL at 06:02

## 2023-05-19 RX ADMIN — HEPARIN SODIUM 5000 UNIT(S): 5000 INJECTION INTRAVENOUS; SUBCUTANEOUS at 05:59

## 2023-05-19 NOTE — PRE-ANESTHESIA EVALUATION ADULT - NSRADCARDRESULTSFT_GEN_ALL_CORE
EKG: Sinus bradycardia - 58 bpm, Left axis deviation, Left bundle branch block  CXR: No radiographic evidence of acute cardiopulmonary disease.    TTE:   1. Technically suboptimal study.   2. Normal global left ventricular systolic function.   3. LV Ejection Fraction by Kwan's Method with a biplane EF of 70 %.   4. Abnormal septal motion consistent with left bundle branch block.   5. Spectral Doppler shows impaired relaxation pattern of left ventricular myocardial filling (Grade I diastolic dysfunction).   6. Mild left ventricular hypertrophy.   7. Normal left atrial size.   8. Right atrial size not well visualized.   9. Mild aortic regurgitation.  10. Mild tricuspid regurgitation.  11. No evidence of mitral valve regurgitation.  12. Dilatation of the aortic root.  13. Estimated pulmonary artery systolic pressure is 35.7 mmHg assuming a right atrial pressure of 3 mmHg, which is consistent with borderline   pulmonary hypertension.  14. There is a significant pericardial fat pad present. Cannot rule out small effusion.

## 2023-05-19 NOTE — PROGRESS NOTE ADULT - SUBJECTIVE AND OBJECTIVE BOX
Progress note    INTERVAL HPI/OVERNIGHT EVENTS:    Patient seen and examined in recliner. She is in no distress. Son at bedside.      REVIEW OF SYSTEMS:  All other 13 Review of systems were reviewed and are negative    FAMILY HISTORY:    T(C): 35.8 (05-19-23 @ 04:30), Max: 36.9 (05-18-23 @ 21:07)  HR: 69 (05-19-23 @ 04:30) (69 - 77)  BP: 104/55 (05-19-23 @ 04:30) (102/51 - 130/69)  RR: 18 (05-19-23 @ 04:30) (18 - 18)  SpO2: 94% (05-19-23 @ 04:30) (94% - 97%)  Wt(kg): --Vital Signs Last 24 Hrs  T(C): 35.8 (19 May 2023 04:30), Max: 36.9 (18 May 2023 21:07)  T(F): 96.5 (19 May 2023 04:30), Max: 98.4 (18 May 2023 21:07)  HR: 69 (19 May 2023 04:30) (69 - 77)  BP: 104/55 (19 May 2023 04:30) (102/51 - 130/69)  BP(mean): --  RR: 18 (19 May 2023 04:30) (18 - 18)  SpO2: 94% (19 May 2023 04:30) (94% - 97%)    Parameters below as of 19 May 2023 04:30  Patient On (Oxygen Delivery Method): room air      No Known Allergies      PHYSICAL EXAM:  GENERAL: NAD, well-groomed, well-developed  HEAD:  Atraumatic, Normocephalic  EYES: EOMI, PERRLA, conjunctiva and sclera clear  ENMT: No tonsillar erythema, exudates, or enlargement; Moist mucous membranes, Good dentition, No lesions  NECK: Supple, No JVD, Normal thyroid  NERVOUS SYSTEM:  Alert & Oriented X3, Good concentration; Motor Strength 5/5 B/L upper and lower extremities; DTRs 2+ intact and symmetric  CHEST/LUNG: Clear to percussion bilaterally; No rales, rhonchi, wheezing, or rubs  HEART: Regular rate and rhythm; No murmurs, rubs, or gallops  ABDOMEN: Soft, Nontender, Nondistended; Bowel sounds present  EXTREMITIES:  2+ Peripheral Pulses, No clubbing, cyanosis, or edema  LYMPH: No lymphadenopathy noted  SKIN: No rashes or lesions    Consultant(s) Notes Reviewed:  [x ] YES  [ ] NO  Care Discussed with Consultants/Other Providers [ x] YES  [ ] NO    LABS:      RADIOLOGY & ADDITIONAL TESTS:    Imaging Personally Reviewed:  [ ] YES  [ ] NO  acetaminophen     Tablet .. 650 milliGRAM(s) Oral every 6 hours PRN  amLODIPine   Tablet 5 milliGRAM(s) Oral every 24 hours  chlorhexidine 2% Cloths 1 Application(s) Topical <User Schedule>  heparin   Injectable 5000 Unit(s) SubCutaneous every 8 hours  levothyroxine 25 MICROGram(s) Oral <User Schedule>  losartan 50 milliGRAM(s) Oral daily  megestrol 20 milliGRAM(s) Oral daily  metoprolol succinate ER 50 milliGRAM(s) Oral daily      HEALTH ISSUES - PROBLEM Dx:    A/P: 78 y/o female with pmhx of HTN, CKD 3, hypothyroidism, kidney stone , pre-DM,  arthritis of spine  presents with generalized weakness. Found to have elevated troponin on workup on admission. Admitted to [facility] for further management.    #Generalized weakness d/t poor PO Intake and electrolyte disturbances  #Weight loss and Dysphagia  - TSH wnl, FT4 pending  - cxr negative, ua negative  - s/p IV hydration  - Speech and swallow evaluation recs appreciated  - GI consulted recs appreciated  - Cardiology clearance appre  - TTE LVEF 70%, abnormal septal motion c/w LBBB, Grade I diastolic dysfunction, borderline PHTN. Small pericardial fat pad present. Other mild findings (refer to complete report)  - Hold PPI d/t hypomagnesemia  - For EGD today    #PAMELA on CKDIIIb  - Cr 3.5-->1.6 --> baseline  - CT a/p Several nonobstructing bilateral renal calculi measure up to 8 x 8 x 8 mm within the left renal lower pole  - s/p IV hydration   - avoid nephrotoxic meds  - holding HCTZ-losartan, C/w losartan 50mg    #elevated troponin due to Demand Ischemia/NSTEMI type 2  - Trop peaked at 0.03  - 2D echo as above  - cardiology consulted    #Reactive leukocytosis  -WBC slowly trending up  -No Hx of steroids use, no urinary symptoms or coughing  -CXR and UA (-). If trend continues, repeat infectious w/u    #mild endometrial thickening  -out follow up with GYN for pelvis US     #HTN  -monitor BP level   -HCTZ-Losartan on hold, resume losartan  -cw metoprolol   -cw amlodipine     #hypothyroidism   -TSH level   - cw levothyroxine 25 mcg 1x week on Tuesday     #arthritis of spine   -cw tylenol prn   - avoid NSAID given PAMELA    #dental caries  -outpt follow up with  orthodontists    #Incidental Findings  - Imaging reveal: 1. hepatic granulomata. 2. splenic granulomata. 3.  Several nonobstructing bilateral renal calculi measure 4. mild endometrial thickening measuring up to 6 mm  - discussed with patient and the need to follow up with outpatient    #SUPPORTIVE MANAGEMENT/DISPO  - Dispo: SNF when ready  - DVT Ppx:  hep sq  - GI Ppx: PPI  - Diet: regular   Progress note    INTERVAL HPI/OVERNIGHT EVENTS:    Patient seen and examined in recliner. She is in no distress. Son at bedside.      REVIEW OF SYSTEMS:  All other 13 Review of systems were reviewed and are negative    FAMILY HISTORY:    T(C): 35.8 (05-19-23 @ 04:30), Max: 36.9 (05-18-23 @ 21:07)  HR: 69 (05-19-23 @ 04:30) (69 - 77)  BP: 104/55 (05-19-23 @ 04:30) (102/51 - 130/69)  RR: 18 (05-19-23 @ 04:30) (18 - 18)  SpO2: 94% (05-19-23 @ 04:30) (94% - 97%)  Wt(kg): --Vital Signs Last 24 Hrs  T(C): 35.8 (19 May 2023 04:30), Max: 36.9 (18 May 2023 21:07)  T(F): 96.5 (19 May 2023 04:30), Max: 98.4 (18 May 2023 21:07)  HR: 69 (19 May 2023 04:30) (69 - 77)  BP: 104/55 (19 May 2023 04:30) (102/51 - 130/69)  BP(mean): --  RR: 18 (19 May 2023 04:30) (18 - 18)  SpO2: 94% (19 May 2023 04:30) (94% - 97%)    Parameters below as of 19 May 2023 04:30  Patient On (Oxygen Delivery Method): room air      No Known Allergies      PHYSICAL EXAM:  GENERAL: NAD, well-groomed, well-developed  HEAD:  Atraumatic, Normocephalic  EYES: EOMI, PERRLA, conjunctiva and sclera clear  ENMT: No tonsillar erythema, exudates, or enlargement; Moist mucous membranes, Good dentition, No lesions  NECK: Supple, No JVD, Normal thyroid  NERVOUS SYSTEM:  Alert & Oriented X3, Good concentration; Motor Strength 5/5 B/L upper and lower extremities; DTRs 2+ intact and symmetric  CHEST/LUNG: Clear to percussion bilaterally; No rales, rhonchi, wheezing, or rubs  HEART: Regular rate and rhythm; No murmurs, rubs, or gallops  ABDOMEN: Soft, Nontender, Nondistended; Bowel sounds present  EXTREMITIES:  2+ Peripheral Pulses, No clubbing, cyanosis, or edema  LYMPH: No lymphadenopathy noted  SKIN: No rashes or lesions    Consultant(s) Notes Reviewed:  [x ] YES  [ ] NO  Care Discussed with Consultants/Other Providers [ x] YES  [ ] NO    LABS:      RADIOLOGY & ADDITIONAL TESTS:    Imaging Personally Reviewed:  [ ] YES  [ ] NO  acetaminophen     Tablet .. 650 milliGRAM(s) Oral every 6 hours PRN  amLODIPine   Tablet 5 milliGRAM(s) Oral every 24 hours  chlorhexidine 2% Cloths 1 Application(s) Topical <User Schedule>  heparin   Injectable 5000 Unit(s) SubCutaneous every 8 hours  levothyroxine 25 MICROGram(s) Oral <User Schedule>  losartan 50 milliGRAM(s) Oral daily  megestrol 20 milliGRAM(s) Oral daily  metoprolol succinate ER 50 milliGRAM(s) Oral daily      HEALTH ISSUES - PROBLEM Dx:    A/P: 76 y/o female with pmhx of HTN, CKD 3, hypothyroidism, kidney stone , pre-DM,  arthritis of spine  presents with generalized weakness. Found to have elevated troponin on workup on admission. Admitted to [facility] for further management.    #Generalized weakness d/t poor PO Intake and electrolyte disturbances  #Weight loss and Dysphagia  - TSH wnl, FT4 pending  - cxr negative, ua negative  - s/p IV hydration  - Speech and swallow evaluation recs appreciated  - GI consulted recs appreciated  - Cardiology clearance appre  - TTE LVEF 70%, abnormal septal motion c/w LBBB, Grade I diastolic dysfunction, borderline PHTN. Small pericardial fat pad present. Other mild findings (refer to complete report)  - Hold PPI d/t hypomagnesemia, start mag tablets daily  - For EGD today    #PAMELA on CKDIIIb  - Cr 3.5-->1.6 --> baseline  - CT a/p Several nonobstructing bilateral renal calculi measure up to 8 x 8 x 8 mm within the left renal lower pole  - s/p IV hydration   - avoid nephrotoxic meds  - holding HCTZ-losartan, C/w losartan 50mg    #elevated troponin due to Demand Ischemia/NSTEMI type 2  - Trop peaked at 0.03  - 2D echo as above  - cardiology consulted    #Reactive leukocytosis  -WBC slowly trending up  -No Hx of steroids use, no urinary symptoms or coughing  -CXR and UA (-). If trend continues, repeat infectious w/u    #mild endometrial thickening  -out follow up with GYN for pelvis US     #HTN  -monitor BP level   -HCTZ-Losartan on hold, resume losartan  -cw metoprolol   -cw amlodipine     #hypothyroidism   -TSH level   - cw levothyroxine 25 mcg 1x week on Tuesday     #arthritis of spine   -cw tylenol prn   - avoid NSAID given PAMELA    #dental caries  -outpt follow up with  orthodontists    #Incidental Findings  - Imaging reveal: 1. hepatic granulomata. 2. splenic granulomata. 3.  Several nonobstructing bilateral renal calculi measure 4. mild endometrial thickening measuring up to 6 mm  - discussed with patient and the need to follow up with outpatient    #SUPPORTIVE MANAGEMENT/DISPO  - Dispo: SNF when ready  - DVT Ppx:  hep sq  - GI Ppx: PPI  - Diet: regular

## 2023-05-19 NOTE — PRE-ANESTHESIA EVALUATION ADULT - NSANTHOSAYNRD_GEN_A_CORE
denies/No. WILLIS screening performed.  STOP BANG Legend: 0-2 = LOW Risk; 3-4 = INTERMEDIATE Risk; 5-8 = HIGH Risk

## 2023-05-19 NOTE — CHART NOTE - NSCHARTNOTEFT_GEN_A_CORE
PACU ANESTHESIA ADMISSION NOTE    Procedure: EGD and biopsies  Post op diagnosis:  Antral gastric ulcers, poor appetite, esophageal ring/stricture    __x__  Patent Airway    __x__  Full return of protective reflexes    __x__  Full recovery from anesthesia / back to baseline status    Vitals:  T(C): 98.7 F  HR: 86  BP: 113/58  RR: 14  SpO2: 95%    Mental Status:  __x__ Awake   __x___ Alert   _____ Drowsy   _____ Sedated    Nausea/Vomiting:  __x__ NO  ______Yes,   See Post - Op Orders          Pain Scale (0-10):  _____    Treatment: ____ None    __x__ See Post - Op/PCA Orders    Post - Operative Fluids:   ____ Oral   __x__ See Post - Op Orders    Plan: Discharge:   ____Home       __x___Floor     _____Critical Care    _____  Other:_________________    Comments: uneventful anesthesia course no complications. Vitals stable. Pt transferred to PACU. Transfer to floor when criteria is met

## 2023-05-19 NOTE — CHART NOTE - NSCHARTNOTEFT_GEN_A_CORE
Please refer to sunrise results section for EGD findings and recommendations   all discontinued via transfer orders reinitiated and Hospitalist MUST review ALL orders to assure they are accurate and correct Please refer to sunrise results section for EGD findings and recommendations   all discontinued via transfer orders reinitiated and Hospitalist MUST review ALL orders to assure they are accurate and correct  -spoke with and updated patient

## 2023-05-20 ENCOUNTER — EMERGENCY (EMERGENCY)
Facility: HOSPITAL | Age: 78
LOS: 0 days | Discharge: NURSING, REHAB OR LONG-TERM CARE | End: 2023-05-21
Attending: EMERGENCY MEDICINE
Payer: MEDICARE

## 2023-05-20 VITALS
RESPIRATION RATE: 18 BRPM | SYSTOLIC BLOOD PRESSURE: 117 MMHG | TEMPERATURE: 97 F | HEART RATE: 76 BPM | DIASTOLIC BLOOD PRESSURE: 65 MMHG

## 2023-05-20 VITALS
TEMPERATURE: 98 F | OXYGEN SATURATION: 98 % | SYSTOLIC BLOOD PRESSURE: 113 MMHG | WEIGHT: 149.91 LBS | DIASTOLIC BLOOD PRESSURE: 57 MMHG | RESPIRATION RATE: 19 BRPM | HEIGHT: 65 IN | HEART RATE: 78 BPM

## 2023-05-20 DIAGNOSIS — E03.9 HYPOTHYROIDISM, UNSPECIFIED: ICD-10-CM

## 2023-05-20 DIAGNOSIS — R06.09 OTHER FORMS OF DYSPNEA: ICD-10-CM

## 2023-05-20 DIAGNOSIS — I12.9 HYPERTENSIVE CHRONIC KIDNEY DISEASE WITH STAGE 1 THROUGH STAGE 4 CHRONIC KIDNEY DISEASE, OR UNSPECIFIED CHRONIC KIDNEY DISEASE: ICD-10-CM

## 2023-05-20 DIAGNOSIS — M19.90 UNSPECIFIED OSTEOARTHRITIS, UNSPECIFIED SITE: ICD-10-CM

## 2023-05-20 DIAGNOSIS — N18.30 CHRONIC KIDNEY DISEASE, STAGE 3 UNSPECIFIED: ICD-10-CM

## 2023-05-20 DIAGNOSIS — Z87.442 PERSONAL HISTORY OF URINARY CALCULI: ICD-10-CM

## 2023-05-20 LAB
ALBUMIN SERPL ELPH-MCNC: 3.2 G/DL — LOW (ref 3.5–5.2)
ALP SERPL-CCNC: 60 U/L — SIGNIFICANT CHANGE UP (ref 30–115)
ALT FLD-CCNC: 6 U/L — SIGNIFICANT CHANGE UP (ref 0–41)
ANION GAP SERPL CALC-SCNC: 11 MMOL/L — SIGNIFICANT CHANGE UP (ref 7–14)
ANION GAP SERPL CALC-SCNC: 11 MMOL/L — SIGNIFICANT CHANGE UP (ref 7–14)
AST SERPL-CCNC: 22 U/L — SIGNIFICANT CHANGE UP (ref 0–41)
BASE EXCESS BLDV CALC-SCNC: 2.7 MMOL/L — SIGNIFICANT CHANGE UP (ref -2–3)
BASOPHILS # BLD AUTO: 0.05 K/UL — SIGNIFICANT CHANGE UP (ref 0–0.2)
BASOPHILS NFR BLD AUTO: 0.4 % — SIGNIFICANT CHANGE UP (ref 0–1)
BILIRUB SERPL-MCNC: 0.5 MG/DL — SIGNIFICANT CHANGE UP (ref 0.2–1.2)
BUN SERPL-MCNC: 15 MG/DL — SIGNIFICANT CHANGE UP (ref 10–20)
BUN SERPL-MCNC: 16 MG/DL — SIGNIFICANT CHANGE UP (ref 10–20)
CA-I SERPL-SCNC: 1.27 MMOL/L — SIGNIFICANT CHANGE UP (ref 1.15–1.33)
CALCIUM SERPL-MCNC: 9.2 MG/DL — SIGNIFICANT CHANGE UP (ref 8.4–10.5)
CALCIUM SERPL-MCNC: 9.3 MG/DL — SIGNIFICANT CHANGE UP (ref 8.4–10.5)
CHLORIDE SERPL-SCNC: 104 MMOL/L — SIGNIFICANT CHANGE UP (ref 98–110)
CHLORIDE SERPL-SCNC: 106 MMOL/L — SIGNIFICANT CHANGE UP (ref 98–110)
CO2 SERPL-SCNC: 23 MMOL/L — SIGNIFICANT CHANGE UP (ref 17–32)
CO2 SERPL-SCNC: 24 MMOL/L — SIGNIFICANT CHANGE UP (ref 17–32)
CREAT SERPL-MCNC: 1.6 MG/DL — HIGH (ref 0.7–1.5)
CREAT SERPL-MCNC: 1.7 MG/DL — HIGH (ref 0.7–1.5)
EGFR: 30 ML/MIN/1.73M2 — LOW
EGFR: 33 ML/MIN/1.73M2 — LOW
EOSINOPHIL # BLD AUTO: 0.28 K/UL — SIGNIFICANT CHANGE UP (ref 0–0.7)
EOSINOPHIL NFR BLD AUTO: 2.3 % — SIGNIFICANT CHANGE UP (ref 0–8)
GAS PNL BLDV: 135 MMOL/L — LOW (ref 136–145)
GAS PNL BLDV: SIGNIFICANT CHANGE UP
GLUCOSE SERPL-MCNC: 115 MG/DL — HIGH (ref 70–99)
GLUCOSE SERPL-MCNC: 90 MG/DL — SIGNIFICANT CHANGE UP (ref 70–99)
HCO3 BLDV-SCNC: 26 MMOL/L — SIGNIFICANT CHANGE UP (ref 22–29)
HCT VFR BLD CALC: 32.7 % — LOW (ref 37–47)
HCT VFR BLD CALC: 34.7 % — LOW (ref 37–47)
HCT VFR BLDA CALC: 35 % — LOW (ref 39–51)
HGB BLD CALC-MCNC: 11.8 G/DL — LOW (ref 12.6–17.4)
HGB BLD-MCNC: 10.8 G/DL — LOW (ref 12–16)
HGB BLD-MCNC: 11.6 G/DL — LOW (ref 12–16)
IMM GRANULOCYTES NFR BLD AUTO: 0.6 % — HIGH (ref 0.1–0.3)
LACTATE BLDV-MCNC: 1.2 MMOL/L — SIGNIFICANT CHANGE UP (ref 0.5–2)
LYMPHOCYTES # BLD AUTO: 1.74 K/UL — SIGNIFICANT CHANGE UP (ref 1.2–3.4)
LYMPHOCYTES # BLD AUTO: 14 % — LOW (ref 20.5–51.1)
MAGNESIUM SERPL-MCNC: 1.7 MG/DL — LOW (ref 1.8–2.4)
MAGNESIUM SERPL-MCNC: 2.7 MG/DL — HIGH (ref 1.8–2.4)
MCHC RBC-ENTMCNC: 33 G/DL — SIGNIFICANT CHANGE UP (ref 32–37)
MCHC RBC-ENTMCNC: 33.1 PG — HIGH (ref 27–31)
MCHC RBC-ENTMCNC: 33.2 PG — HIGH (ref 27–31)
MCHC RBC-ENTMCNC: 33.4 G/DL — SIGNIFICANT CHANGE UP (ref 32–37)
MCV RBC AUTO: 100.3 FL — HIGH (ref 81–99)
MCV RBC AUTO: 99.4 FL — HIGH (ref 81–99)
MONOCYTES # BLD AUTO: 0.96 K/UL — HIGH (ref 0.1–0.6)
MONOCYTES NFR BLD AUTO: 7.7 % — SIGNIFICANT CHANGE UP (ref 1.7–9.3)
NEUTROPHILS # BLD AUTO: 9.29 K/UL — HIGH (ref 1.4–6.5)
NEUTROPHILS NFR BLD AUTO: 75 % — SIGNIFICANT CHANGE UP (ref 42.2–75.2)
NRBC # BLD: 0 /100 WBCS — SIGNIFICANT CHANGE UP (ref 0–0)
NRBC # BLD: 0 /100 WBCS — SIGNIFICANT CHANGE UP (ref 0–0)
NT-PROBNP SERPL-SCNC: 778 PG/ML — HIGH (ref 0–300)
PCO2 BLDV: 35 MMHG — LOW (ref 39–42)
PH BLDV: 7.48 — HIGH (ref 7.32–7.43)
PLATELET # BLD AUTO: 184 K/UL — SIGNIFICANT CHANGE UP (ref 130–400)
PLATELET # BLD AUTO: 191 K/UL — SIGNIFICANT CHANGE UP (ref 130–400)
PMV BLD: 11.4 FL — HIGH (ref 7.4–10.4)
PMV BLD: 11.4 FL — HIGH (ref 7.4–10.4)
PO2 BLDV: 45 MMHG — SIGNIFICANT CHANGE UP
POTASSIUM BLDV-SCNC: 3.6 MMOL/L — SIGNIFICANT CHANGE UP (ref 3.5–5.1)
POTASSIUM SERPL-MCNC: 3.9 MMOL/L — SIGNIFICANT CHANGE UP (ref 3.5–5)
POTASSIUM SERPL-MCNC: 4.8 MMOL/L — SIGNIFICANT CHANGE UP (ref 3.5–5)
POTASSIUM SERPL-SCNC: 3.9 MMOL/L — SIGNIFICANT CHANGE UP (ref 3.5–5)
POTASSIUM SERPL-SCNC: 4.8 MMOL/L — SIGNIFICANT CHANGE UP (ref 3.5–5)
PROT SERPL-MCNC: 6.1 G/DL — SIGNIFICANT CHANGE UP (ref 6–8)
RBC # BLD: 3.26 M/UL — LOW (ref 4.2–5.4)
RBC # BLD: 3.49 M/UL — LOW (ref 4.2–5.4)
RBC # FLD: 12.1 % — SIGNIFICANT CHANGE UP (ref 11.5–14.5)
RBC # FLD: 12.3 % — SIGNIFICANT CHANGE UP (ref 11.5–14.5)
SAO2 % BLDV: 80.4 % — SIGNIFICANT CHANGE UP
SODIUM SERPL-SCNC: 138 MMOL/L — SIGNIFICANT CHANGE UP (ref 135–146)
SODIUM SERPL-SCNC: 141 MMOL/L — SIGNIFICANT CHANGE UP (ref 135–146)
TROPONIN T SERPL-MCNC: <0.01 NG/ML — SIGNIFICANT CHANGE UP
WBC # BLD: 10.95 K/UL — HIGH (ref 4.8–10.8)
WBC # BLD: 12.4 K/UL — HIGH (ref 4.8–10.8)
WBC # FLD AUTO: 10.95 K/UL — HIGH (ref 4.8–10.8)
WBC # FLD AUTO: 12.4 K/UL — HIGH (ref 4.8–10.8)

## 2023-05-20 PROCEDURE — 71045 X-RAY EXAM CHEST 1 VIEW: CPT | Mod: 26

## 2023-05-20 PROCEDURE — 80053 COMPREHEN METABOLIC PANEL: CPT

## 2023-05-20 PROCEDURE — 82803 BLOOD GASES ANY COMBINATION: CPT

## 2023-05-20 PROCEDURE — 93005 ELECTROCARDIOGRAM TRACING: CPT

## 2023-05-20 PROCEDURE — 93010 ELECTROCARDIOGRAM REPORT: CPT

## 2023-05-20 PROCEDURE — 83880 ASSAY OF NATRIURETIC PEPTIDE: CPT

## 2023-05-20 PROCEDURE — 82330 ASSAY OF CALCIUM: CPT

## 2023-05-20 PROCEDURE — 99285 EMERGENCY DEPT VISIT HI MDM: CPT | Mod: 25

## 2023-05-20 PROCEDURE — 36415 COLL VENOUS BLD VENIPUNCTURE: CPT

## 2023-05-20 PROCEDURE — 83605 ASSAY OF LACTIC ACID: CPT

## 2023-05-20 PROCEDURE — 84132 ASSAY OF SERUM POTASSIUM: CPT

## 2023-05-20 PROCEDURE — 84484 ASSAY OF TROPONIN QUANT: CPT

## 2023-05-20 PROCEDURE — 85014 HEMATOCRIT: CPT

## 2023-05-20 PROCEDURE — 99285 EMERGENCY DEPT VISIT HI MDM: CPT | Mod: FS

## 2023-05-20 PROCEDURE — 71045 X-RAY EXAM CHEST 1 VIEW: CPT

## 2023-05-20 PROCEDURE — 85018 HEMOGLOBIN: CPT

## 2023-05-20 PROCEDURE — 99239 HOSP IP/OBS DSCHRG MGMT >30: CPT

## 2023-05-20 PROCEDURE — 85025 COMPLETE CBC W/AUTO DIFF WBC: CPT

## 2023-05-20 PROCEDURE — 83735 ASSAY OF MAGNESIUM: CPT

## 2023-05-20 PROCEDURE — 84295 ASSAY OF SERUM SODIUM: CPT

## 2023-05-20 RX ORDER — MAGNESIUM OXIDE 400 MG ORAL TABLET 241.3 MG
2 TABLET ORAL
Qty: 0 | Refills: 0 | DISCHARGE
Start: 2023-05-20

## 2023-05-20 RX ORDER — LEVOTHYROXINE SODIUM 125 MCG
1 TABLET ORAL
Qty: 0 | Refills: 0 | DISCHARGE
Start: 2023-05-20

## 2023-05-20 RX ORDER — MEGESTROL ACETATE 40 MG/ML
1 SUSPENSION ORAL
Qty: 0 | Refills: 0 | DISCHARGE
Start: 2023-05-20

## 2023-05-20 RX ORDER — PANTOPRAZOLE SODIUM 20 MG/1
1 TABLET, DELAYED RELEASE ORAL
Qty: 0 | Refills: 0 | DISCHARGE
Start: 2023-05-20

## 2023-05-20 RX ORDER — LEVOTHYROXINE SODIUM 125 MCG
1 TABLET ORAL
Refills: 0 | DISCHARGE

## 2023-05-20 RX ORDER — MAGNESIUM SULFATE 500 MG/ML
4 VIAL (ML) INJECTION ONCE
Refills: 0 | Status: COMPLETED | OUTPATIENT
Start: 2023-05-20 | End: 2023-05-20

## 2023-05-20 RX ADMIN — HEPARIN SODIUM 5000 UNIT(S): 5000 INJECTION INTRAVENOUS; SUBCUTANEOUS at 14:06

## 2023-05-20 RX ADMIN — PANTOPRAZOLE SODIUM 40 MILLIGRAM(S): 20 TABLET, DELAYED RELEASE ORAL at 05:30

## 2023-05-20 RX ADMIN — Medication 50 MILLIGRAM(S): at 05:29

## 2023-05-20 RX ADMIN — LOSARTAN POTASSIUM 50 MILLIGRAM(S): 100 TABLET, FILM COATED ORAL at 05:29

## 2023-05-20 RX ADMIN — MAGNESIUM OXIDE 400 MG ORAL TABLET 400 MILLIGRAM(S): 241.3 TABLET ORAL at 14:08

## 2023-05-20 RX ADMIN — CHLORHEXIDINE GLUCONATE 1 APPLICATION(S): 213 SOLUTION TOPICAL at 05:28

## 2023-05-20 RX ADMIN — MEGESTROL ACETATE 20 MILLIGRAM(S): 40 SUSPENSION ORAL at 14:07

## 2023-05-20 RX ADMIN — Medication 25 GRAM(S): at 12:18

## 2023-05-20 RX ADMIN — HEPARIN SODIUM 5000 UNIT(S): 5000 INJECTION INTRAVENOUS; SUBCUTANEOUS at 05:29

## 2023-05-20 NOTE — ED ADULT TRIAGE NOTE - AS PAIN REST
Patient Name: Dana Jauregui  YOB: 1976          MRN number:  [id number]  Date:  11/23/2021  Referring Physician:  Malinda Bustos      Dear Dr. Kiley Thompson has attended 6 visits in Physical Therapy.    Dx: Karen Hester [place] and will resume skilled PT there. Pt was educated on and issued an updated HEP to promote strength and stability gains for return to PLOF. Pt has been discharge at this time.        Goals:   ·  Pt will improve cervical AROM flexion as soon as possible to [phone]. I certify the need for these services furnished under this plan of treatment and while under my care.     X___________________________________________________ Date____________________    Certification From: 82/00/4384 0 (no pain/absence of nonverbal indicators of pain)

## 2023-05-20 NOTE — DISCHARGE NOTE PROVIDER - NSDCFUADDAPPT_GEN_ALL_CORE_FT
APPTS ARE READY TO BE MADE: [ ] YES    Best Family or Patient Contact (if needed):    Additional Information about above appointments (if needed):    1: [facility] for establishing care with any PCP  2: Dr. Anton Marcos  3: Dr. Roberta Curtis    Other comments or requests:

## 2023-05-20 NOTE — DISCHARGE NOTE PROVIDER - CARE PROVIDER_API CALL
FIDEL, Pierson  Internal Medicine  [address] [place] [address]  Phone: [phone]  Fax: ([phone]  Follow Up Time:     Anton Marcos)  Gastroenterology; Internal Medicine  [address] [place] [id number]  Phone: [phone]  Fax: [phone]  Follow Up Time:     Roberta Curtis)  Nephrology  [address] [facility], [facility] [address]  [place] [address]  Phone: [phone]  Fax: [phone]  Follow Up Time:

## 2023-05-20 NOTE — ED ADULT TRIAGE NOTE - HEIGHT IN CM
Pharmacy Tube Feeding Consult    Medication reviewed for administration by feeding tube and for potential food/drug interactions.    Recommendation: No changes are needed at this time, unless patient is unable/unsafe to take clotrimazole geoffrey via buccal route, in which case would consider changing to topical nystatin suspension.      Pharmacy will continue to follow as new medications are ordered.    Samira Thurman, PharmD      
165.1

## 2023-05-20 NOTE — DISCHARGE NOTE PROVIDER - PROVIDER TOKENS
PROVIDER:[TOKEN:[[id number]]],PROVIDER:[TOKEN:[[id number]]],PROVIDER:[TOKEN:[[id number]]]

## 2023-05-20 NOTE — DISCHARGE NOTE NURSING/CASE MANAGEMENT/SOCIAL WORK - NSDCPEFALRISK_GEN_ALL_CORE
For information on Fall & Injury Prevention, visit: [email or web address] OR  [email or web address] OR  https://www.cdc.gov/steadi/patient.html

## 2023-05-20 NOTE — ED ADULT TRIAGE NOTE - CHIEF COMPLAINT QUOTE
elissa from [facility], pt d/c today from hospital for rehab, facility indicated pt was dyspneic on exertion, pt denies sob and has no other complaints

## 2023-05-20 NOTE — DISCHARGE NOTE PROVIDER - NSDCMRMEDTOKEN_GEN_ALL_CORE_FT
amLODIPine 5 mg oral tablet: 1 orally once a day  levothyroxine 25 mcg (0.025 mg) oral tablet: 1 tab(s) orally once a week Please take on Tuesday once per week on an empty stomach  losartan-hydrochlorothiazide 50 mg-12.5 mg oral tablet: 1 orally once a day  magnesium oxide 400 mg oral tablet: 2 tab(s) orally once a day  megestrol 20 mg oral tablet: 1 tab(s) orally once a day  metoprolol succinate 50 mg oral capsule, extended release: 1 orally once a day  pantoprazole 40 mg oral delayed release tablet: 1 tab(s) orally once a day

## 2023-05-20 NOTE — ED PROVIDER NOTE - PATIENT PORTAL LINK FT
You can access the FollowMyHealth Patient Portal offered by [facility] by registering at the following website: [email or web address]. By joining [employer]’s FollowMyHealth portal, you will also be able to view your health information using other applications (apps) compatible with our system.

## 2023-05-20 NOTE — DISCHARGE NOTE NURSING/CASE MANAGEMENT/SOCIAL WORK - WAS YOUR LAST COVID-19 VACCINE GREATER THAN OR EQUAL TO TWO MONTHS AGO?
December 14, 2022      Anthony A Manisha  [address] [place] [address]        Dear ,    We are writing to inform you of your test results.    Insurance formulary change.  No longer has Ozempic coverage.   Change to Trulicity script sent.  Will need dose adjustment after 4 weeks.   Referral placed to [facility] as well.  Schedule please.   Follow up 3 month as planned.       Resulted Orders   Hemoglobin A1c   Result Value Ref Range    Estimated Average Glucose 189 mg/dL    Hemoglobin A1C 8.2 (H) <5.7 %      Comment:      Normal <5.7%   Prediabetes 5.7-6.4%    Diabetes 6.5% or higher     Note: Adopted from ADA consensus guidelines.   Comprehensive metabolic panel (BMP + Alb, Alk Phos, ALT, AST, Total. Bili, TP)   Result Value Ref Range    Sodium 139 136 - 145 mmol/L    Potassium 4.6 3.4 - 5.3 mmol/L    Chloride 103 98 - 107 mmol/L    Carbon Dioxide (CO2) 24 22 - 29 mmol/L    Anion Gap 12 7 - 15 mmol/L    Urea Nitrogen 15.5 8.0 - 23.0 mg/dL    Creatinine 1.37 (H) 0.67 - 1.17 mg/dL    Calcium 9.2 8.8 - 10.2 mg/dL    Glucose 177 (H) 70 - 99 mg/dL    Alkaline Phosphatase 91 40 - 129 U/L    AST 17 10 - 50 U/L    ALT 15 10 - 50 U/L    Protein Total 6.8 6.4 - 8.3 g/dL    Albumin 4.2 3.5 - 5.2 g/dL    Bilirubin Total 0.5 <=1.2 mg/dL    GFR Estimate 52 (L) >60 mL/min/1.73m2      Comment:      Effective December 21, 2021 eGFRcr in adults is calculated using the 2021 CKD-EPI creatinine equation which includes age and gender (Wilmer et al., NEJM, DOI: 10.1056/NAYFjx6767459)   CBC with platelets and differential   Result Value Ref Range    WBC Count 7.3 4.0 - 11.0 10e3/uL    RBC Count 4.53 4.40 - 5.90 10e6/uL    Hemoglobin 14.6 13.3 - 17.7 g/dL    Hematocrit 42.7 40.0 - 53.0 %    MCV 94 78 - 100 fL    MCH 32.2 26.5 - 33.0 pg    MCHC 34.2 31.5 - 36.5 g/dL    RDW 12.8 10.0 - 15.0 %    Platelet Count 185 150 - 450 10e3/uL    % Neutrophils 77 %    % Lymphocytes 14 %    % Monocytes 8 %    % Eosinophils 1 %    % Basophils 0 %     % Immature Granulocytes 0 %    NRBCs per 100 WBC 0 <1 /100    Absolute Neutrophils 5.6 1.6 - 8.3 10e3/uL    Absolute Lymphocytes 1.0 0.8 - 5.3 10e3/uL    Absolute Monocytes 0.6 0.0 - 1.3 10e3/uL    Absolute Eosinophils 0.1 0.0 - 0.7 10e3/uL    Absolute Basophils 0.0 0.0 - 0.2 10e3/uL    Absolute Immature Granulocytes 0.0 <=0.4 10e3/uL    Absolute NRBCs 0.0 10e3/uL       If you have any questions or concerns, please call the clinic at the number listed above.       Sincerely,      Zoila Jones MD           Yes

## 2023-05-20 NOTE — DISCHARGE NOTE PROVIDER - NSDCCPCAREPLAN_GEN_ALL_CORE_FT
PRINCIPAL DISCHARGE DIAGNOSIS  Diagnosis: Generalized weakness  Assessment and Plan of Treatment: You were admitted for generalized weakness with abnormal electrolytes, which were resolved after repletion. Poor PO intake was evaluated by GI who recommended EGD. Endoscopy on 5/19 showed Distal esophagus revealed ring that was 100% circumferential and 5% obstructed. Biopsies were taken to r/o eosinophilic esophagitis. There was a moderate hiatal hernia. Erythema in whole stomach was compatible w/ gastritis. Ulcers were present in the antrum. Please follow up with the pathology report and follow up with GI (referral has been given to you). Please Continue taking pantoprazole 40mg daily and repeat EGD in 2 months per GI.      SECONDARY DISCHARGE DIAGNOSES  Diagnosis: Hypomagnesemia  Assessment and Plan of Treatment: Please continue with your magnesium supplements as prescribed. Please follow up with a repeat BMP and magnesium level with your PCP    Diagnosis: Stomach ulcer  Assessment and Plan of Treatment: Please avoid NSAIDS for pain. You can use OTC tylenol.    Diagnosis: Chronic heart failure with preserved ejection fraction  Assessment and Plan of Treatment: Echocardiogram showed : TTE LVEF 70%, abnormal septal motion c/w LBBB, Grade I diastolic dysfunction, borderline PHTN. Small pericardial fat pad present.    Diagnosis: Renal calculi  Assessment and Plan of Treatment: CT abd showed Several nonobstructing bilateral renal calculi measure up to 8 x 8 x 8 mm within the left renal lower pole. Please monitor for flank pain for now.    Diagnosis: Endometrial thickening on ultrasound  Assessment and Plan of Treatment: CT imaging showed Endometrial thickening. Please follow up with a pelvic US as outpatient.    Diagnosis: Hepatic granuloma  Assessment and Plan of Treatment: You were found to have a splenic and hepatic granuloma on CT. Please follow up with your PCP regarding these findings.

## 2023-05-20 NOTE — ED PROVIDER NOTE - PHYSICAL EXAMINATION
VITAL SIGNS: I have reviewed nursing notes and confirm.  CONSTITUTIONAL: 78 yo F laying on stretcher; in no acute distress.  SKIN: Skin exam is warm and dry, no acute rash.  HEAD: Normocephalic; atraumatic.  EYES: Conjunctiva and sclera clear.  ENT: No nasal discharge; airway clear.   NECK: Supple; non tender.  CARD: S1, S2 normal; no murmurs, gallops, or rubs. Regular rate and rhythm.  RESP: No wheezes, rales or rhonchi. Speaking in full sentences.   ABD: Normal bowel sounds; soft; non-distended; non-tender; No rebound or guarding. No CVA tenderness.  EXT: Normal ROM. No clubbing, cyanosis or edema.  NEURO: Alert, oriented. Grossly unremarkable. No focal deficits.

## 2023-05-20 NOTE — DISCHARGE NOTE PROVIDER - HOSPITAL COURSE
A 76 y/o female with pmhx of HTN, CKD 3, hypothyroidism, kidney stone , pre-DM,  arthritis of spine  presents with generalized weakness. PT reports not feeling well for the past week. PT reports not eating much, has appetite, reports eats few bites a day. Pt reports 2 weeks ago had dental infection/abscess had 2 extraction at front was treated with amoxicillin x 1 week. Pt denies difficulty swallowing, PT reports feeling weak today , could not get up. PT usually ambulates with a cane.  PT denies nausea or vomiting. Pt reports palpitation yesterday for few minutes. Pt denies chest pain or shortness of breath. PT reports hx of constipation, last BM yesterday. Pt reports dizziness yesterday systolic 95.  PT reports does not follow up with nephrologist for CKD. PT states has been taking at least 600  mg ibuprofen daily for past 2 weeks for toothache. PT denies fever, chills, cough, burning with urination. Pt lives with her sister needs help at home per son. Patient was evaluated for generalized weakness due to poor PO intake and electrolyte imbalances. She was seen by speech and swallow and GI for poor PO intake. EGD on 5/19 showed Distal esophagus revealed ring that was 100% circumferential and 5% obstructed. Biopsies were taken to r/o eosinophilic esophagitis. There was a moderate hiatal hernia. Erythema in whole stomach was compatible w/ gastritis. Ulcers were present in the antrum. Patient was also found to have Elevated troponins and echocardiogram shows TTE LVEF 70%, abnormal septal motion c/w LBBB, Grade I diastolic dysfunction, borderline PHTN. Small pericardial fat pad. Patient was medically stable for discharge to [facility]'s rehab.

## 2023-05-20 NOTE — DISCHARGE NOTE PROVIDER - CARE PROVIDERS DIRECT ADDRESSES
[email or web address]

## 2023-05-20 NOTE — ED PROVIDER NOTE - CLINICAL SUMMARY MEDICAL DECISION MAKING FREE TEXT BOX
77-year-old female, history of HTN, CKD, kidney stones, hypothyroidism, DM, arthritis, just got discharged today from the hospital, sent back from nursing home due to dyspnea on exertion.  Labs noted for WBC 12.4, hemoglobin 11.6, creatinine 1.7, troponin less than 0.01, , lactate 1.2.  EKG normal sinus rhythm no acute changes.  Chest x-ray no acute disease.  Patient resting comfortably and showing no signs of distress.  Will DC to follow-up with PCP.

## 2023-05-20 NOTE — DISCHARGE NOTE NURSING/CASE MANAGEMENT/SOCIAL WORK - NSDCFUADDAPPT_GEN_ALL_CORE_FT
APPTS ARE READY TO BE MADE: [ ] YES    Best Family or Patient Contact (if needed):    Additional Information about above appointments (if needed):    1: [facility] for establishing care with any PCP  2: Dr. Anton Marcos  3: Dr. Roberta Curtis    Other comments or requests:

## 2023-05-20 NOTE — DISCHARGE NOTE NURSING/CASE MANAGEMENT/SOCIAL WORK - PATIENT PORTAL LINK FT
You can access the FollowMyHealth Patient Portal offered by [facility] by registering at the following website: [email or web address]. By joining [employer]’s FollowMyHealth portal, you will also be able to view your health information using other applications (apps) compatible with our system.

## 2023-05-20 NOTE — ED ADULT TRIAGE NOTE - NS ED NURSE DIRECT TO ROOM YN
Modified Barium Swallow    Patient Name:  Chirag Thompson   MRN:  [id number]    *MBSS did not record 2' to equipment malfunction which limited thoroughness of report.     Recommendations:     Recommendations:                General Recommendations:  GI evaluation, Dysphagia therapy, Speech/language therapy and Cognitive-linguistic therapy  Diet recommendations:  NPO, NPO   Aspiration Precautions: Continue alternate means of nutrition and Strict aspiration precautions   General Precautions: Standard, aspiration, fall, aphasia, NPO  Communication strategies:  provide increased time to answer, go to room if call light pushed and patient with expressive/receptive aphasia    Referral     Reason for Referral  Patient was referred for a Modified Barium Swallow Study to assess the efficiency of his/her swallow function, rule out aspiration and make recommendations regarding safe dietary consistencies, effective compensatory strategies, and safe eating environment.     Diagnosis: Embolic stroke involving left middle cerebral artery       History:     Past Medical History:   Diagnosis Date    Arthritis     Chronic a-fib     Current use of long term anticoagulation     Hypertension     Pressure ulcer of right leg, unspecified pressure ulcer stage     was going to [facility] healed now    Stroke due to embolism of left middle cerebral artery 01/10/2020    left temp parietal    Thyroid disease        Objective:     Current Respiratory Status: 01/21/20    Alert: yes    Cooperative: yes    Follows Directions: inconsistent    Visualization  · Patient was seen in the lateral view  · NG tube observed in place  · Supplement O2 in place via nasal cannula    Oral Peripheral Examination  · Oral Musculature: unable to assess due to poor participation/comprehension  · Dentition: present and adequate  · Secretion Management: adequate  · Mucosal Quality: good  · Lingual Strength and Mobility: WFL  · Voice Prior to PO Intake: clear,  normal volume  · Oral Musculature Comments: Face is symmetrical at rest and during smile. Lingual and labial strength and ROM assessed in function due to pt difficulty follow commands, appear to be WFL for speech and oral intake. Pt presenting with severe expressive aphasia, verbal expression is non-fluent.     Consistencies Assessed  · Nectar thick 5mL (1 tsp sip)  · Puree 5mL (1/2 tsp applesauce mixed with barium pudding)   · *Trials limited by high aspiration risk with further intake    Oral Preparation/Oral Phase  · Oral residue following trials    Pharyngeal Phase   *Description of pharyngeal phase limited by study not recording  · Timely swallow initiation  · Hyoid excursion and laryngeal elevation appeared adequate  · No penetration or aspiration during the swallow  · Adequate pharyngeal constriction and peristalsis of bolus   · Flash penetration of nectar-thick liquids that had pooled in pyriforms following the swallow, cleared prior to reaching vocal cords    Cervical Esophageal Phase  *Description of cervical esophageal phase limited by study not recording  · UES was not visible 2' to patient positioning, but there was significant retrograde flow of all trials to the level of the pyriforms which may indicate UES dysfunction  · Multiple cued swallows were ineffective in clearing pooled residue from hyopharynx    Assessment:     Impressions  · Patient demonstrated moderate pharyngeal/esophageal dysphagia characterized by significant retrograde flow of all trials resulting penetration of nectar-thick liquids after the swallow. Patient exhibited significant pooling of trials in hypopharynx that placed him at a high risk of eventual aspiration. Retrograde flow appeared related to UES dysfunction, but UES could not be visualized 2' to shadow.      Prognosis: Fair    Barriers:  · Impaired communication and ability to consistently adhere to compensatory strategies  · Waxing/waning alertness and  participation    Plan  · Patient would benefit from alternate means of medication/nutrition/hydration at this time. ST will trial dysphagia therapy with limited PO intake (ice chips sparingly) during sessions. Patient may also benefit from a GI consult to address apparent UES dysfunction.     Education  Results were discussed with patient. Results were discussed with Medical Team who was in agreement with plan.     Goals:   Multidisciplinary Problems     SLP Goals        Problem: SLP Goal    Goal Priority Disciplines Outcome   SLP Goal     SLP Ongoing, Progressing   Description:  Goals due 1/21  1.  Pt. Will participate in ongoing assessment of swallow at bedside  2.  Pt. Will participate in speech language eval /met  3.  Model simple commands with 100% accuracy  4.  Respond to simple yes/no questions with 70% accuracy  5.  Complete single words on au tomatic speech tasks with 50% accuracy                      Plan:   · Patient to be seen:  Therapy Frequency: 4 x/week   · Plan of Care expires:  02/12/20  · Plan of Care reviewed with:  patient        Discharge recommendations:  nursing facility, skilled   Barriers to Discharge:  Level of Skilled Assistance Needed     Time Tracking:   SLP Treatment Date:   01/21/20  Speech Start Time:  1528  Speech Stop Time:  1538     Speech Total Time (min):  10 min    Isai Champagne CCC-SLP  Speech-Language Pathology  Pager: [phone]   01/21/2020   Yes

## 2023-05-20 NOTE — ED PROVIDER NOTE - OBJECTIVE STATEMENT
77-year-old female with past medical history of HTN, CKD 3, kidney stones, hypothyroidism, pre-DM and arthritis presents to the ED sent in from [facility] rehab after being discharged from hospital today for evaluation of dyspnea on exertion.  Patient states she had dyspnea on exertion for a while, states symptoms are not worse than her usual.  Patient states she feels well, denies any complaints at this time. Pt denies fever, chills, nausea, vomiting, abdominal pain, diarrhea, headache, dizziness, weakness, chest pain, back pain, LOC, trauma, urinary symptoms, cough, calf pain/swelling, recent travel, recent surgery.

## 2023-05-21 VITALS — OXYGEN SATURATION: 93 %

## 2023-05-21 PROBLEM — M19.90 UNSPECIFIED OSTEOARTHRITIS, UNSPECIFIED SITE: Chronic | Status: ACTIVE | Noted: 2023-05-15

## 2023-05-21 PROBLEM — N20.0 CALCULUS OF KIDNEY: Chronic | Status: ACTIVE | Noted: 2023-05-15

## 2023-05-21 PROBLEM — E03.9 HYPOTHYROIDISM, UNSPECIFIED: Chronic | Status: ACTIVE | Noted: 2023-05-15

## 2023-05-21 PROBLEM — N18.30 CHRONIC KIDNEY DISEASE, STAGE 3 UNSPECIFIED: Chronic | Status: ACTIVE | Noted: 2023-05-15

## 2023-05-21 PROBLEM — I10 ESSENTIAL (PRIMARY) HYPERTENSION: Chronic | Status: ACTIVE | Noted: 2023-05-15

## 2023-05-21 NOTE — ED ADULT NURSE NOTE - FINAL NURSING ELECTRONIC SIGNATURE
PROGRESS NOTE    Patel Rivera is a 2 day old female infant delivered via  at 37 0/7 weeks. Infant is breast and formula feeding and doing well. Voiding and stooling well. There is a slight tongue tie but mom feels she is latching well. There was ABO incompatibility with 2+ lenore. Total bilirubin was 7.7 at 12 hours so double phototherapy was started. Repeat bilirubin this morning at 24 hours was 7.9.     [place]  Feeding: Natural Human Milk and Formula   Urine Occurrence: 1 (22 050)  Stool Occurrence: 1 (02/15/22 99)      PHYSICAL EXAM  VITALS:   Visit Vitals  Pulse 120   Temp 97.8 °F (36.6 °C) (Axillary)   Resp 40   Ht 19.5\" (49.5 cm) Comment: Filed from Delivery Summary   Wt 2.722 kg (6 lb) Comment: 6lb 0oz   HC 33 cm (12.99\") Comment: Filed from Delivery Summary   SpO2 100%   BMI 11.10 kg/m²     WEIGHT CHANGE SINCE BIRTH: -4%  GENERAL: Patel Rivera is an alert, vigorous female with appropriate behavior. She is in no acute distress.  SKIN: Her skin is warm with normal turgor. The color of the skin is pink. There is no rash. There are no bruises or other signs of injury.  No significant jaundice.  HEAD: The head is atraumatic and normocephalic. The anterior fontanel is open and flat; the posterior fontanel is open.  EYES: The conjunctivae appear normal with neither icterus nor subconjunctival hemorrhage.   Red reflexes are seen bilaterally.  EARS: Pinnae and external ear canals normal.  NOSE: There is no nasal flaring, nares patent bilaterally.  THROAT:  The oropharynx is normal.  There is no cleft of the palate. Mild tongue tie but able to get tongue to bottom lip  NECK: Clavicles without crepitus.  TRUNK AND THORAX: There are no lesions on the trunk; there is no dimple over the presacral area. There are no retractions.  LUNGS: The lung fields are clear to auscultation.  HEART: The precordium is quiet. The heart rhythm is grossly regular. S1 and S2 are normal. There are no murmurs. The femoral  pulses are normal.  ABDOMEN: The umbilical cord stump is normal. There is not an umbilical hernia. The abdomen is flat and soft.   GENITALIA: normal female genitalia   RECTAL: anus patent  EXTREMITIES: Moving all 4 extremities. The hip exam is normal  . There are no hip clicks or clunks.    NEUROLOGIC: she displays normal tone throughout. She is not jittery and she has normal  reflexes. Mallika reflex present.    ASSESSMENT:   Well 2 day old female infant.   Normal growth and development.  Breast/formula feeding  ABO incompatibility  Hyperbilirubinemia    PLAN:    Routine care  Continue double phototherapy. Will repeat serum bilirubin at 5 pm to make sure not increasing. Will continue double phototherapy overnight with likely discharge in the morning  Will wait on clipping tongue tie at this time as infant is latching better    Daja Bermudez MD  2/15/22   21-May-2023 01:37

## 2023-05-21 NOTE — ED ADULT NURSE NOTE - NSFALLUNIVINTERV_ED_ALL_ED
Jannette Bliss is a 15 month old female presenting for   Chief Complaint   Patient presents with   • Well Infant Birth to 23 Months     Denies Latex allergy or sensitivity.    Medication verified and med list updated  Refills needed today: No    Health Maintenance Due   Topic Date Due   • IPV Vaccine (3 of 4 - 4-dose series) 05/16/2020   • Hepatitis B Vaccine (4 of 4 - 4-dose series) 05/16/2020   • DTaP/Tdap/Td Vaccine (3 - DTaP) 05/16/2020   • Influenza Vaccine (1 of 2) Never done   • MMR Vaccine (1 of 2 - Standard series) Never done   • Varicella Vaccine (1 of 2 - 2-dose childhood series) Never done   • HIB Vaccine (3 of 3 - PRP-OMP Series) 11/16/2020   • Hepatitis A Vaccine (1 of 2 - 2-dose series) Never done   • Pneumococcal Vaccine 0-64 (3 of 3) 11/16/2020   • Well Child Exam 15 Months  02/16/2021       Patient is due for topics as listed above but is not proceeding with Immunization(s) Dtap/Tdap/Td, Hep A, Hep B, HIB, Influenza, IPV, MMR, Pneumococcal and Varicella at this time.           Last lab results:   No results found for: HGBA1C  No results found for: CHOLESTEROL  No results found for: HDL  No results found for: TRIGLYCERIDE  No results found for: CALCLDL  No results found for: URMIC, UCR, MALBCR  No results found for: IFOB              Depression Screening:            Bed/Stretcher in lowest position, wheels locked, appropriate side rails in place/Call bell, personal items and telephone in reach/Instruct patient to call for assistance before getting out of bed/chair/stretcher/Non-slip footwear applied when patient is off stretcher/[place] to call system/Physically safe environment - no spills, clutter or unnecessary equipment/Purposeful proactive rounding/Room/bathroom lighting operational, light cord in reach

## 2023-05-21 NOTE — ED ADULT NURSE REASSESSMENT NOTE - NS ED NURSE REASSESS COMMENT FT1
spoke to nursing administrator at [facility]. pt on O2 at nursing [place] , order for 2L NC.

## 2023-05-21 NOTE — ED ADULT NURSE NOTE - CHIEF COMPLAINT QUOTE
elissa from [facility], pt d/c today from hospital for rehab, facility indicated pt was dyspneic on exertion, pt denies sob and has no other complaints

## 2023-05-23 LAB — SURGICAL PATHOLOGY STUDY: SIGNIFICANT CHANGE UP

## 2023-05-28 NOTE — CHART NOTE - NSCHARTNOTEFT_GEN_A_CORE
Mahi Lagunas is a 77 year old F that was admitted on May 15th. She had elevated troponin with concern for demand ischemia without NSTEMI type 2 could not be excluded at the time of discharge.

## 2023-05-28 NOTE — CDI QUERY NOTE - NSCDIOTHERTXTBX_GEN_ALL_CORE_HH
Based on your professional judgment and the clinical indicators provided below, please clarify if elevated troponin due to demand ischemia/NSTEMI type 2:    •  Elevated troponin with concern for demand ischemia with NSTEMI type 2 could not be excluded at the time of discharge    •  Elevated troponin with concern for demand ischemia without NSTEMI type 2 could not be excluded at the time of discharge    •  Demand ischemia and NSTEMI type 2 associated with elevated troponin were excluded at the time of discharge    •  Other (please specify):    •  Clinically unable to determine      CLINICAL INDICATORS    5/18  Progress Note Adult-Cardiology Nurse Practitioner: ... seen for cardiac pre-op eval. CE flat ... ECG: Sinus Christos, LBBB. likely old. TTE normal LVSF EF 70% paradoxical septal wma c/w LBBB, G1DD. Impression: # Cardiac pre-op eval for EGD. # mild troponin Elevation in setting of CKD/ possible dehydration ... # PAMELA. # HTN ...    5/19  Progress Note Adult-Hospitalist Attending: ... #elevated troponin due to Demand Ischemia/NSTEMI type 2. - Trop peaked at 0.03. - 2D echo as above. - cardiology consulted ...    5/20 Discharge Note: ... Patient was also found to have Elevated troponins and echocardiogram shows TTE LVEF 70%, abnormal septal motion c/w LBBB, Grade I diastolic dysfunction, borderline PHTN. Small pericardial fat pad ... Assessment and Plan of Treatment: You were admitted for generalized weakness with abnormal electrolytes, which were resolved after repletion ...      Thank you,  Kenia Esparza RN, CCS, CCDS  [phone] Based on your professional judgment and the clinical indicators provided below, please clarify if elevated troponin due to demand ischemia/NSTEMI type 2 can be further specified as:    •  Elevated troponin with concern for demand ischemia with NSTEMI type 2 could not be excluded at the time of discharge    •  Elevated troponin with concern for demand ischemia without NSTEMI type 2 could not be excluded at the time of discharge    •  Demand ischemia and NSTEMI type 2 associated with elevated troponin were excluded at the time of discharge    •  Other (please specify):    •  Clinically unable to determine      CLINICAL INDICATORS    5/18  Progress Note Adult-Cardiology Nurse Practitioner: ... seen for cardiac pre-op eval. CE flat ... ECG: Sinus Christos, LBBB. likely old. TTE normal LVSF EF 70% paradoxical septal wma c/w LBBB, G1DD. Impression: # Cardiac pre-op eval for EGD. # mild troponin Elevation in setting of CKD/ possible dehydration ... # PAMELA. # HTN ...    5/19  Progress Note Adult-Hospitalist Attending: ... #elevated troponin due to Demand Ischemia/NSTEMI type 2. - Trop peaked at 0.03. - 2D echo as above. - cardiology consulted ...    5/20 Discharge Note: ... Patient was also found to have Elevated troponins and echocardiogram shows TTE LVEF 70%, abnormal septal motion c/w LBBB, Grade I diastolic dysfunction, borderline PHTN. Small pericardial fat pad ... Assessment and Plan of Treatment: You were admitted for generalized weakness with abnormal electrolytes, which were resolved after repletion ...      Thank you,  Kenia Esparza RN, CCS, CCDS  [phone]

## 2023-06-04 ENCOUNTER — INPATIENT (INPATIENT)
Facility: HOSPITAL | Age: 78
LOS: 2 days | Discharge: NURSING, REHAB OR LONG-TERM CARE | DRG: 682 | End: 2023-06-07
Attending: INTERNAL MEDICINE | Admitting: INTERNAL MEDICINE
Payer: MEDICARE

## 2023-06-04 VITALS
OXYGEN SATURATION: 98 % | HEIGHT: 65 IN | SYSTOLIC BLOOD PRESSURE: 104 MMHG | HEART RATE: 67 BPM | TEMPERATURE: 98 F | DIASTOLIC BLOOD PRESSURE: 56 MMHG | RESPIRATION RATE: 18 BRPM

## 2023-06-04 DIAGNOSIS — N17.9 ACUTE KIDNEY FAILURE, UNSPECIFIED: ICD-10-CM

## 2023-06-04 LAB
ALBUMIN SERPL ELPH-MCNC: 3.8 G/DL — SIGNIFICANT CHANGE UP (ref 3.5–5.2)
ALP SERPL-CCNC: 54 U/L — SIGNIFICANT CHANGE UP (ref 30–115)
ALT FLD-CCNC: 7 U/L — SIGNIFICANT CHANGE UP (ref 0–41)
ANION GAP SERPL CALC-SCNC: 14 MMOL/L — SIGNIFICANT CHANGE UP (ref 7–14)
APTT BLD: 36.7 SEC — SIGNIFICANT CHANGE UP (ref 27–39.2)
AST SERPL-CCNC: 12 U/L — SIGNIFICANT CHANGE UP (ref 0–41)
BASOPHILS # BLD AUTO: 0.06 K/UL — SIGNIFICANT CHANGE UP (ref 0–0.2)
BASOPHILS NFR BLD AUTO: 0.5 % — SIGNIFICANT CHANGE UP (ref 0–1)
BILIRUB SERPL-MCNC: 0.5 MG/DL — SIGNIFICANT CHANGE UP (ref 0.2–1.2)
BUN SERPL-MCNC: 43 MG/DL — HIGH (ref 10–20)
CALCIUM SERPL-MCNC: 11.3 MG/DL — HIGH (ref 8.4–10.4)
CHLORIDE SERPL-SCNC: 98 MMOL/L — SIGNIFICANT CHANGE UP (ref 98–110)
CO2 SERPL-SCNC: 22 MMOL/L — SIGNIFICANT CHANGE UP (ref 17–32)
CREAT SERPL-MCNC: 2.9 MG/DL — HIGH (ref 0.7–1.5)
EGFR: 16 ML/MIN/1.73M2 — LOW
EOSINOPHIL # BLD AUTO: 0.08 K/UL — SIGNIFICANT CHANGE UP (ref 0–0.7)
EOSINOPHIL NFR BLD AUTO: 0.6 % — SIGNIFICANT CHANGE UP (ref 0–8)
GLUCOSE SERPL-MCNC: 165 MG/DL — HIGH (ref 70–99)
HCT VFR BLD CALC: 41.6 % — SIGNIFICANT CHANGE UP (ref 37–47)
HGB BLD-MCNC: 13.1 G/DL — SIGNIFICANT CHANGE UP (ref 12–16)
IMM GRANULOCYTES NFR BLD AUTO: 0.7 % — HIGH (ref 0.1–0.3)
INR BLD: 1.19 RATIO — SIGNIFICANT CHANGE UP (ref 0.65–1.3)
LYMPHOCYTES # BLD AUTO: 1.7 K/UL — SIGNIFICANT CHANGE UP (ref 1.2–3.4)
LYMPHOCYTES # BLD AUTO: 13.7 % — LOW (ref 20.5–51.1)
MCHC RBC-ENTMCNC: 31.5 G/DL — LOW (ref 32–37)
MCHC RBC-ENTMCNC: 31.6 PG — HIGH (ref 27–31)
MCV RBC AUTO: 100.5 FL — HIGH (ref 81–99)
MONOCYTES # BLD AUTO: 0.66 K/UL — HIGH (ref 0.1–0.6)
MONOCYTES NFR BLD AUTO: 5.3 % — SIGNIFICANT CHANGE UP (ref 1.7–9.3)
NEUTROPHILS # BLD AUTO: 9.81 K/UL — HIGH (ref 1.4–6.5)
NEUTROPHILS NFR BLD AUTO: 79.2 % — HIGH (ref 42.2–75.2)
NRBC # BLD: 0 /100 WBCS — SIGNIFICANT CHANGE UP (ref 0–0)
PLATELET # BLD AUTO: 359 K/UL — SIGNIFICANT CHANGE UP (ref 130–400)
PMV BLD: 12.1 FL — HIGH (ref 7.4–10.4)
POTASSIUM SERPL-MCNC: 4.2 MMOL/L — SIGNIFICANT CHANGE UP (ref 3.5–5)
POTASSIUM SERPL-SCNC: 4.2 MMOL/L — SIGNIFICANT CHANGE UP (ref 3.5–5)
PROT SERPL-MCNC: 6.7 G/DL — SIGNIFICANT CHANGE UP (ref 6–8)
PROTHROM AB SERPL-ACNC: 13.6 SEC — HIGH (ref 9.95–12.87)
RBC # BLD: 4.14 M/UL — LOW (ref 4.2–5.4)
RBC # FLD: 12.3 % — SIGNIFICANT CHANGE UP (ref 11.5–14.5)
SODIUM SERPL-SCNC: 134 MMOL/L — LOW (ref 135–146)
TROPONIN T SERPL-MCNC: 0.03 NG/ML — CRITICAL HIGH
WBC # BLD: 12.4 K/UL — HIGH (ref 4.8–10.8)
WBC # FLD AUTO: 12.4 K/UL — HIGH (ref 4.8–10.8)

## 2023-06-04 PROCEDURE — 83605 ASSAY OF LACTIC ACID: CPT

## 2023-06-04 PROCEDURE — 99285 EMERGENCY DEPT VISIT HI MDM: CPT | Mod: FS

## 2023-06-04 PROCEDURE — 0225U NFCT DS DNA&RNA 21 SARSCOV2: CPT

## 2023-06-04 PROCEDURE — 84540 ASSAY OF URINE/UREA-N: CPT

## 2023-06-04 PROCEDURE — 93010 ELECTROCARDIOGRAM REPORT: CPT

## 2023-06-04 PROCEDURE — 84300 ASSAY OF URINE SODIUM: CPT

## 2023-06-04 PROCEDURE — 84100 ASSAY OF PHOSPHORUS: CPT

## 2023-06-04 PROCEDURE — 36415 COLL VENOUS BLD VENIPUNCTURE: CPT

## 2023-06-04 PROCEDURE — 85027 COMPLETE CBC AUTOMATED: CPT

## 2023-06-04 PROCEDURE — 71045 X-RAY EXAM CHEST 1 VIEW: CPT

## 2023-06-04 PROCEDURE — 83735 ASSAY OF MAGNESIUM: CPT

## 2023-06-04 PROCEDURE — 85730 THROMBOPLASTIN TIME PARTIAL: CPT

## 2023-06-04 PROCEDURE — 99223 1ST HOSP IP/OBS HIGH 75: CPT

## 2023-06-04 PROCEDURE — 80048 BASIC METABOLIC PNL TOTAL CA: CPT

## 2023-06-04 PROCEDURE — 83935 ASSAY OF URINE OSMOLALITY: CPT

## 2023-06-04 PROCEDURE — 93005 ELECTROCARDIOGRAM TRACING: CPT

## 2023-06-04 PROCEDURE — 95819 EEG AWAKE AND ASLEEP: CPT

## 2023-06-04 PROCEDURE — 82962 GLUCOSE BLOOD TEST: CPT

## 2023-06-04 PROCEDURE — 81003 URINALYSIS AUTO W/O SCOPE: CPT

## 2023-06-04 PROCEDURE — 93970 EXTREMITY STUDY: CPT

## 2023-06-04 PROCEDURE — 80053 COMPREHEN METABOLIC PANEL: CPT

## 2023-06-04 PROCEDURE — 76770 US EXAM ABDO BACK WALL COMP: CPT

## 2023-06-04 PROCEDURE — 70496 CT ANGIOGRAPHY HEAD: CPT | Mod: 26,MA

## 2023-06-04 PROCEDURE — 82746 ASSAY OF FOLIC ACID SERUM: CPT

## 2023-06-04 PROCEDURE — 70498 CT ANGIOGRAPHY NECK: CPT | Mod: 26,MA

## 2023-06-04 PROCEDURE — 84484 ASSAY OF TROPONIN QUANT: CPT

## 2023-06-04 PROCEDURE — 82570 ASSAY OF URINE CREATININE: CPT

## 2023-06-04 PROCEDURE — 82607 VITAMIN B-12: CPT

## 2023-06-04 PROCEDURE — 85025 COMPLETE CBC W/AUTO DIFF WBC: CPT

## 2023-06-04 PROCEDURE — 70450 CT HEAD/BRAIN W/O DYE: CPT | Mod: 26,59,MA

## 2023-06-04 RX ORDER — PANTOPRAZOLE SODIUM 20 MG/1
40 TABLET, DELAYED RELEASE ORAL
Refills: 0 | Status: DISCONTINUED | OUTPATIENT
Start: 2023-06-04 | End: 2023-06-07

## 2023-06-04 RX ORDER — MIRTAZAPINE 45 MG/1
7.5 TABLET, ORALLY DISINTEGRATING ORAL AT BEDTIME
Refills: 0 | Status: DISCONTINUED | OUTPATIENT
Start: 2023-06-04 | End: 2023-06-07

## 2023-06-04 RX ORDER — METOPROLOL TARTRATE 50 MG
1 TABLET ORAL
Refills: 0 | DISCHARGE

## 2023-06-04 RX ORDER — MAGNESIUM OXIDE 400 MG ORAL TABLET 241.3 MG
400 TABLET ORAL DAILY
Refills: 0 | Status: DISCONTINUED | OUTPATIENT
Start: 2023-06-04 | End: 2023-06-07

## 2023-06-04 RX ORDER — METOPROLOL TARTRATE 50 MG
50 TABLET ORAL DAILY
Refills: 0 | Status: DISCONTINUED | OUTPATIENT
Start: 2023-06-04 | End: 2023-06-07

## 2023-06-04 RX ORDER — MIRTAZAPINE 45 MG/1
1 TABLET, ORALLY DISINTEGRATING ORAL
Refills: 0 | DISCHARGE

## 2023-06-04 RX ORDER — LEVOTHYROXINE SODIUM 125 MCG
25 TABLET ORAL
Refills: 0 | Status: DISCONTINUED | OUTPATIENT
Start: 2023-06-04 | End: 2023-06-07

## 2023-06-04 RX ORDER — MEGESTROL ACETATE 40 MG/ML
20 SUSPENSION ORAL DAILY
Refills: 0 | Status: DISCONTINUED | OUTPATIENT
Start: 2023-06-04 | End: 2023-06-07

## 2023-06-04 NOTE — ED PROVIDER NOTE - ATTENDING APP SHARED VISIT CONTRIBUTION OF CARE
77-year-old female past medical history as above presents with alteration of consciousness this afternoon observed by son in nursing home.  According to son she became sleepy, slumped over, and had difficulty speaking.  Resolved after several hours.  Completely resolved in the emergency department.  Patient is amnestic to the event.  Denies other symptoms.  Currently feels well.  NIH stroke scale of 0 at this time.  While there was some slurred speech associated with this episode I doubt this is a stroke.  Will get CT head CT angio and syncope work-up.  No history of seizure no seizure-like activity no incontinence.  No tongue biting.

## 2023-06-04 NOTE — ED PROVIDER NOTE - CARE PLAN
1 Principal Discharge DX:	Metabolic encephalopathy  Secondary Diagnosis:	PAMELA (acute kidney injury)

## 2023-06-04 NOTE — CONSULT NOTE ADULT - SUBJECTIVE AND OBJECTIVE BOX
Neurology Consult    Patient is a 77F with a PMH of HTN, CKD 3, hypothyroidism, kidney stone , pre-DM, arthritis, and recent discharge from [facility] on 5/20 for after being admitted for generalized weakness and poor PO intake (course was complicated by EGD which showed esophageal ring obstruction, gastritis, and antral ulcers, troponemia, and LBBB), who presents today after a syncopal episode. Per patient's son (present at bedside), patient was discharged to [facility] after recent hospitalization where she has continued to decline from a functional status. Yesterday, while visiting the patient at [facility], son witnessed her suddenly slump forward in her wheelchair and become unresponsive. Son walked into the hallway to get assistance. Upon re-entering the room, patient regained consciousness and returned to baseline. Per son, immediately prior to the episode, patient said "I don't feel good". Today, son was told that a similar episode occurred at [facility], but this time associated with slurred speech. Patient does not recall the episodes, but denies any urinary incontinence or tongue bite. Son states that patient has continued to have poor PO intake and generalized weakness. Neurology was consulted for further evaluation.        PAST MEDICAL & SURGICAL HISTORY:  HTN (hypertension)      Hypothyroidism      Stage 3 chronic kidney disease      Arthritis      Kidney stone          FAMILY HISTORY:      Social History: (-) x 3    Allergies    No Known Allergies    Intolerances        MEDICATIONS  (STANDING):    MEDICATIONS  (PRN):      Review of systems:    Constitutional: as per HPI  Eyes: No eye pain or discharge  ENMT:  No difficulty hearing; No sinus or throat pain  Neck: No pain or stiffness  Respiratory: No cough, wheezing, chills or hemoptysis  Cardiovascular: No chest pain, palpitations, shortness of breath, dyspnea on exertion  Gastrointestinal: No abdominal pain, nausea, vomiting or hematemesis; No diarrhea or constipation.   Genitourinary: No dysuria, frequency, hematuria or incontinence  Neurological: As per HPI  Skin: No rashes or lesions   Endocrine: No heat or cold intolerance; No hair loss  Musculoskeletal: No joint pain or swelling  Psychiatric: No depression, anxiety, mood swings  Heme/Lymph: No easy bruising or bleeding gums    Vital Signs Last 24 Hrs  T(C): 36.8 (04 Jun 2023 16:08), Max: 36.8 (04 Jun 2023 16:08)  T(F): 98.2 (04 Jun 2023 16:08), Max: 98.2 (04 Jun 2023 16:08)  HR: 59 (04 Jun 2023 16:08) (59 - 67)  BP: 112/57 (04 Jun 2023 16:08) (104/56 - 112/57)  BP(mean): --  RR: 18 (04 Jun 2023 16:08) (18 - 18)  SpO2: 94% (04 Jun 2023 16:08) (94% - 98%)    Parameters below as of 04 Jun 2023 16:08  Patient On (Oxygen Delivery Method): room air          Neurological Examination:  General:  Appearance is consistent with chronologic age.  No abnormal facies.  Gross skin survey within normal limits.    Cognitive/Language:  Awake, alert, and oriented to person, place, time and date.  Nondysarthric.    Cranial Nerves  - Eyes:  Visual fields full.  EOMI w/o nystagmus, skew or reported double vision. No ptosis/weakness of eyelid closure.    - Face:  Facial sensation normal V1 - 3, no facial asymmetry.    - Ears/Nose/Throat:  Hearing grossly intact  Motor examination:  Upper Extremities: L 5/5, R 5/5; Lower extremities: L 5/5, R 5/5  Sensory examination:   Intact to light touch throughout  Reflexes: 2+ throughout  Cerebellum:   FTN intact        Labs:   CBC Full  -  ( 04 Jun 2023 14:33 )  WBC Count : 12.40 K/uL  RBC Count : 4.14 M/uL  Hemoglobin : 13.1 g/dL  Hematocrit : 41.6 %  Platelet Count - Automated : 359 K/uL  Mean Cell Volume : 100.5 fL  Mean Cell Hemoglobin : 31.6 pg  Mean Cell Hemoglobin Concentration : 31.5 g/dL  Auto Neutrophil # : 9.81 K/uL  Auto Lymphocyte # : 1.70 K/uL  Auto Monocyte # : 0.66 K/uL  Auto Eosinophil # : 0.08 K/uL  Auto Basophil # : 0.06 K/uL  Auto Neutrophil % : 79.2 %  Auto Lymphocyte % : 13.7 %  Auto Monocyte % : 5.3 %  Auto Eosinophil % : 0.6 %  Auto Basophil % : 0.5 %    06-04    134<L>  |  98  |  43<H>  ----------------------------<  165<H>  4.2   |  22  |  2.9<H>    Ca    11.3<H>      04 Jun 2023 14:33    TPro  6.7  /  Alb  3.8  /  TBili  0.5  /  DBili  x   /  AST  12  /  ALT  7   /  AlkPhos  54  06-04    LIVER FUNCTIONS - ( 04 Jun 2023 14:33 )  Alb: 3.8 g/dL / Pro: 6.7 g/dL / ALK PHOS: 54 U/L / ALT: 7 U/L / AST: 12 U/L / GGT: x           PT/INR - ( 04 Jun 2023 14:33 )   PT: 13.60 sec;   INR: 1.19 ratio         PTT - ( 04 Jun 2023 14:33 )  PTT:36.7 sec        Neuroimaging:  NCHCT: CT Head No Cont:   ACC: [id number] EXAM:  CT BRAIN   ORDERED BY: MICHAEL SCHMID     PROCEDURE DATE:  06/04/2023          INTERPRETATION:  CLINICAL INDICATION: Slurred speech    TECHNIQUE: Axial CT scanning of the brain was obtained from the skull   base to the vertex without the administration of intravenous contrast.   Reformatted coronal and sagittal images were subsequently obtained and   reviewed.    COMPARISON: None    FINDINGS:  There is no CT evidence of acute transcortical infarct. Age-related   involutional changes and chronic microvascular ischemic changes.    There is no hydrocephalus, mass effect, or acute intracranial hemorrhage.   No extra-axial collection. Basal cisterns are patent.    Mucosal thickening involving the maxillary sinuses. The visualized   paranasal sinuses and mastoid air cells are otherwise clear.    The calvarium is intact.    IMPRESSION:  No evidence of acute transcortical infarct, acute intracranial   hemorrhage, or mass effect.    --- End of Report ---            BLAIR LANDRY MD; Attending Radiologist  This document has been electronically signed. Jun 4 2023  3:55PM (06-04-23 @ 14:36)      06-04-23 @ 18:40

## 2023-06-04 NOTE — H&P ADULT - NSHPLABSRESULTS_GEN_ALL_CORE
(06-04 @ 14:33)                      13.1  12.40 )-----------( 359                 41.6    Neutrophils = 9.81 (79.2%)  Lymphocytes = 1.70 (13.7%)  Eosinophils = 0.08 (0.6%)  Basophils = 0.06 (0.5%)  Monocytes = 0.66 (5.3%)  Bands = --%    06-04    134<L>  |  98  |  43<H>  ----------------------------<  165<H>  4.2   |  22  |  2.9<H>    Ca    11.3<H>      04 Jun 2023 14:33    TPro  6.7  /  Alb  3.8  /  TBili  0.5  /  DBili  x   /  AST  12  /  ALT  7   /  AlkPhos  54  06-04    ( 04 Jun 2023 14:33 )   PT: 13.60 sec;   INR: 1.19 ratio;       PTT:36.7 sec  CARDIAC MARKERS ( 04 Jun 2023 14:33 )  Trop 0.03 ng/mL<HH> / CK x     / CKMB x

## 2023-06-04 NOTE — H&P ADULT - ATTENDING COMMENTS
77F with a PMH of HTN, CKD 3 ( baseline 1.6-1.7 ), hypothyroidism, kidney stones, arthritis, recent discharge from [facility] on 5/20 for after being admitted for generalized weakness and poor PO intake (course was complicated by PAMELA, EGD which showed esophageal ring obstruction, gastritis, and antral ulcers, troponemia, and LBBB), hx of recent covid infection (5/23) and left leg DVT diagnosed at [facility] 2 weeks ago, who presents today after a syncopal episode.     Agree  with assessment  except for changes below.     CTA HEAD: No evidence of flow-limiting stenosis, occlusion or aneurysm.  CTA NECK: Mild stenosis of the right proximal ICA. Mild stenosis of the V4 segment of the left vertebral artery.  CT  Head : No evidence of acute transcortical infarct, acute intracranial  hemorrhage, or mass effect.    IMPRESSION   Syncope Episode   >Ddx (neuro mediated vs orthostatic vs cardiovascular)  >tele monitorting arrhythmic events  >Check orthostatic, check eeg,  tte, tsh/t4  > Seizure precautions   >Consider Neuro eval     PAMELA on  CKD4  Baseline creatinine: 1.5-1.9 Creatinine today  2.9 , Renal US,  Avoid nephrotoxic medication, Monitor BUN/creatinine, Send  Urine Lytes,  IVF    NSTEMI Likley Type  II Ischemic Demand   ECG  Chronic   LBBB  Trend Trop, and Serial ECG  Last TTE 5/17/23: EF 70%, GIDD   Seen by Cardiology on last Admission  Tele Monitoring    Recent COVID infection (5/23) and left leg DVT diagnosed at [facility] 2 Week Ago  Lovenox 80 mg BID despite low GFR   f/u bilateral LE duplex   Received AM lovenox in [facility]   hold AC until tomorrow AM   Agree  with tart heparin ggt in AM   Repeat COVID  test       Hx Generalized weakness   Decreased PO intake   - will resume regular diet ( S&S eval 2 weeks ago )   - c/w megestrol and mirtazepine   - will start calorie count      Hx of HTN   - BP borderline low   - hold amlodipine and losartan-HCTZ     Hx Hypothyroidism   - on levothyroxine 25 mcg once weekly on Tuesday   - TSH 2.85 (5/17/23 77F with a PMH of HTN, CKD 3 ( baseline 1.6-1.7 ), hypothyroidism, kidney stones, arthritis, recent discharge from [facility] on 5/20 for after being admitted for generalized weakness and poor PO intake (course was complicated by PAMELA, EGD which showed esophageal ring obstruction, gastritis, and antral ulcers, troponemia, and LBBB), hx of recent covid infection (5/23) and left leg DVT diagnosed at [facility] 2 weeks ago, who presents today after a syncopal episode.     Agree  with assessment  except for changes below.     CTA HEAD: No evidence of flow-limiting stenosis, occlusion or aneurysm.  CTA NECK: Mild stenosis of the right proximal ICA. Mild stenosis of the V4 segment of the left vertebral artery.  CT  Head : No evidence of acute transcortical infarct, acute intracranial  hemorrhage, or mass effect.    IMPRESSION   Syncope Episode   >Ddx (neuro mediated vs orthostatic vs cardiovascular)  >tele monitorting arrhythmic events  >Check orthostatic, check eeg,  tte, tsh/t4  > Seizure precautions   >Consider Neuro eval     PAMELA on  CKD4  Baseline creatinine: 1.5-1.9 Creatinine today  2.9 , Renal US,  Avoid nephrotoxic medication, Monitor BUN/creatinine, Send  Urine Lytes,  IVF    NSTEMI Likley Type  II Ischemic Demand   ECG  Chronic   LBBB  No active chest Pain   Trend Trop, and Serial ECG  Last TTE 5/17/23: EF 70%, GIDD   Seen by Cardiology on last Admission  Tele Monitoring    Recent COVID infection (5/23) and left leg DVT diagnosed at [facility] 2 Week Ago  Lovenox 80 mg BID despite low GFR   f/u bilateral LE duplex   Received AM lovenox in NH   hold AC until tomorrow AM   Agree  with tart heparin ggt in AM   Repeat COVID  test       Hx Generalized weakness   Decreased PO intake   - will resume regular diet ( S&S eval 2 weeks ago )   - c/w megestrol and mirtazepine   - will start calorie count      Hx of HTN   - BP borderline low   - hold amlodipine and losartan-HCTZ     Hx Hypothyroidism   - on levothyroxine 25 mcg once weekly on Tuesday   - TSH 2.85 (5/17/23    Seen on 06/04/23 77F with a PMH of HTN, CKD 3 ( baseline 1.6-1.7 ), hypothyroidism, kidney stones, arthritis, recent discharge from [facility] on 5/20 for after being admitted for generalized weakness and poor PO intake (course was complicated by PAMELA, EGD which showed esophageal ring obstruction, gastritis, and antral ulcers, troponemia, and LBBB), hx of recent covid infection (5/23) and left leg DVT diagnosed at [facility] 2 weeks ago, who presents today after a syncopal episode.     Agree  with assessment  except for changes below.     CTA HEAD: No evidence of flow-limiting stenosis, occlusion or aneurysm.  CTA NECK: Mild stenosis of the right proximal ICA. Mild stenosis of the V4 segment of the left vertebral artery.  CT  Head : No evidence of acute transcortical infarct, acute intracranial  hemorrhage, or mass effect.    VITAL SIGNS: AFebrile, vital signs stable  CONSTITUTIONAL: Well-developed; well-nourished; in no acute distress.  SKIN: Skin exam is warm and dry, no acute rash.  HEAD: Normocephalic; atraumatic.  EYES: Extraocular movements intact  ENT: No nasal discharge; airway clear. Moist mucus membranes.  NECK: Supple; non tender. No rigidity  CARD: Rregular rate and rhythm. Normal S1, S2; no murmurs, gallops, or rubs.  RESP: On  auscultation bilaterally. No wheezes, rales or rhonchi.  ABD: Abdomen soft; non-tender; non-distended  EXT: Normal ROM. No clubbing, cyanosis or edema.   NEURO: Alert and oriented x 3. No focal deficits.  PSYCH: cooperative, appropriate.     IMPRESSION   Syncope Episode   >Ddx (neuro mediated vs orthostatic vs cardiovascular)  >tele monitorting arrhythmic events  >Check orthostatic, check eeg,  tte, tsh/t4  > Seizure precautions   >Consider Neuro eval     PAMELA on  CKD3-4  Baseline creatinine: 1.5-1.9 Creatinine today  2.9 , Renal US,  Avoid nephrotoxic medication, Monitor BUN/creatinine, Send  Urine Lytes,  IVF    NSTEMI Likley Type  II Ischemic Demand   ECG  Chronic   LBBB  No active chest Pain   Trend Trop, and Serial ECG  Last TTE 5/17/23: EF 70%, GIDD   Seen by Cardiology on last Admission  Tele Monitoring    Recent COVID infection (5/23) and left leg DVT diagnosed at [facility] 2 Week Ago  Lovenox 80 mg BID despite low GFR   f/u bilateral LE duplex   Received AM lovenox in [facility]   hold AC until tomorrow AM   Agree  with tart heparin ggt in AM   Repeat COVID  test       Hx Generalized weakness   Decreased PO intake   - will resume regular diet ( S&S eval 2 weeks ago )   - c/w megestrol and mirtazepine   - will start calorie count      Hx of HTN   - BP borderline low   - hold amlodipine and losartan-HCTZ     Hx Hypothyroidism   - on levothyroxine 25 mcg once weekly on Tuesday   - TSH 2.85 (5/17/23    Seen on 06/04/23

## 2023-06-04 NOTE — H&P ADULT - NSHPPHYSICALEXAM_GEN_ALL_CORE
LOS: 1d    VITALS:   T(C): 36.8 (06-04-23 @ 16:08), Max: 36.8 (06-04-23 @ 16:08)  HR: 59 (06-04-23 @ 16:08) (59 - 67)  BP: 112/57 (06-04-23 @ 16:08) (104/56 - 112/57)  RR: 18 (06-04-23 @ 16:08) (18 - 18)  SpO2: 94% (06-04-23 @ 16:08) (94% - 98%)    GENERAL: NAD, lying in bed comfortably  HEAD:  Atraumatic, Normocephalic  EYES: EOMI, PERRLA, conjunctiva and sclera clear  ENT: dry mucous membranes  NECK: Supple, No JVD  CHEST/LUNG: Clear to auscultation bilaterally; No rales, rhonchi, wheezing, or rubs. Unlabored respirations  HEART: Regular rate and rhythm; No murmurs, rubs, or gallops  ABDOMEN: BSx4; Soft, nontender, nondistended, no CVA tenderness   EXTREMITIES:  2+ Peripheral Pulses, brisk capillary refill. No clubbing, cyanosis, or edema  NERVOUS SYSTEM:  A&Ox3, 5/5 motor bilateral upper and lower extremity, no sensory deficit   SKIN: No rashes or lesions

## 2023-06-04 NOTE — ED ADULT NURSE NOTE - NSFALLHARMRISKINTERV_ED_ALL_ED

## 2023-06-04 NOTE — H&P ADULT - HISTORY OF PRESENT ILLNESS
Patient is a 77F with a PMH of HTN, CKD 3 ( baseline 1.6-1.7 ), hypothyroidism, kidney stones, arthritis, recent discharge from [facility] on 5/20 for after being admitted for generalized weakness and poor PO intake (course was complicated by PAMELA, EGD which showed esophageal ring obstruction, gastritis, and antral ulcers, troponemia, and LBBB), hx of recent covid infection (5/23) and left leg DVT diagnosed at [facility] 2 weeks ago, who presents today after a syncopal episode. Per patient's son (present at bedside), patient was discharged to [facility] after recent hospitalization where she has continued to decline from a functional status. Yesterday, while visiting the patient at [facility], son witnessed her suddenly slump forward in her wheelchair and become unresponsive with eyes opened. Son walked into the hallway to get assistance. Upon re-entering the room, patient regained consciousness and returned to baseline. Per son, immediately prior to the episode, patient said "I don't feel good". Today, son was told that a similar episode occurred at [facility], but this time associated with slurred speech. Patient does not recall the episodes, but denies any urinary incontinence or tongue bite. Son states that patient has continued to have poor PO intake and generalized weakness with barely eating or drinking water ( today she only drank 1/2 cup of water in am ). No other associated sxs. Pt had covid infection on 5/23 she had cough for few days and then it resolved. During the same week she had left leg swelling and duplex done at NH showed multiple clots as per son. She denied any chest pain, SOB, cough, palpitations, urinary symptoms headache, focal weakness. Son also reported that pt was having low BP for the past few days reaching 90s at NH.     In Ed vitals: /56, HR 67, T98.1, spo2 98% on RA   Labs: WBC 12.4K, Na 134, Cr 2.9, BUN 43, trop 0.03  CTH negative   CTA HEAD:  No evidence of flow-limiting stenosis, occlusion or aneurysm.  CTA NECK:  Mild stenosis of the right proximal ICA.  Mild stenosis of the V4 segment of the left vertebral artery.

## 2023-06-04 NOTE — ED PROVIDER NOTE - OBJECTIVE STATEMENT
76 yo F with pmhx of HTN, CKD 3, hypothyroidism, kidney stones, arthritis, presenting for evaluation of 2 episodes of unresponsiveness questionable syncope/seizure episode. Episode was witnessed by son states that she was awake and leaning forwards. No tonic clonic activity but reports her hands were shaking. Patient does not recall any of it. Patient currently has no complaints. No cp, sob, fever, chills, abdominal pain, nausea, vomiting, diarrhea, back pain, urinary symptoms, headache, dizziness, paresthesias, or weakness.

## 2023-06-04 NOTE — H&P ADULT - ASSESSMENT
Patient is a 77F with a PMH of HTN, CKD 3 ( baseline 1.6-1.7 ), hypothyroidism, kidney stones, arthritis, recent discharge from [facility] on 5/20 for after being admitted for generalized weakness and poor PO intake (course was complicated by PAMELA, EGD which showed esophageal ring obstruction, gastritis, and antral ulcers, troponemia, and LBBB), hx of recent covid infection (5/23) and left leg DVT diagnosed at [facility] 2 weeks ago, who presents today after a syncopal episode.     #Syncope   # r/o vasovagal/ r/o seizures   - CTH -ve, CTA H&N -ve   - poor PO intake and dryness on PE, BP borderline ( can be vasovagal )  - monitor on tele   - rEEG   - orthostatic BP     #PAMELA on CKD   #Probably pre-renal   #Hx of non-obstructive stones   - Cr 2.9 ( baseline 1.7 last admission )   - f/u UA and urine lytes    - f/u KBUS   - pt hasn't urinated since today am and feels urge to do do   when I press on the bladder   - told nurse to straight cath and place nieves if > 350 come out   ( pt is currently in hallway )   - gentle hydration LR 75 cc/hr  - stop losartan-HCTZ    - trend Cr   - pt received contrast for CTA H&N today     #Troponemia   - trop 0.03--> 0.02   - EKG showed no ischemic changes, pt asymptomatic   - last TTE 5/17/23: EF 70%, GIDD   - trend AM troponin   - seen by cardio last admission for trop 0.04 and LBB:   recommended stress test as OP   - EKG in AM   - c/w tele monitoring     #New DVT   - no documentation of duplex results in NH chart   - pt was on lovenox 80 mg BID despite low GFR   - f/u bilateral LE duplex   - received AM lovenox in NH   - will hold AC until tomorrow AM   - start heparin ggt in AM     #Hx of recent covid   - asymptomatic currently   - f/u CXR     #Generalized weakness   #Decreased PO intake   - will resume regular diet ( S&S eval 2 weeks ago )   - c/w megestrol and mirtazepine   - will start calorie count      #Hypovolemic Hyponatremia   - Na 134  - f/u after hydration     #Hx of HTN   - BP borderline low   - hold amlodipine and losartan-HCTZ     #Hypothyroidism   - on levothyroxine 25 mcg once weekly on Tuesday   - TSH 2.85 (5/17/23)     #DVT ppx: start heparin ggt in AM   #GI ppx: pantoprazole   #Diet: regular   #Dispo: tele

## 2023-06-04 NOTE — CONSULT NOTE ADULT - ASSESSMENT
77F with a PMH of HTN, CKD 3, hypothyroidism, kidney stone , pre-DM, arthritis, and recent discharge from [facility] on 5/20 for after being admitted for generalized weakness and poor PO intake (course was complicated by EGD which showed esophageal ring obstruction, gastritis, and antral ulcers, troponemia, and LBBB), who presents today after a syncopal episode. Per patient's son (present at bedside), patient was discharged to [facility] after recent hospitalization where she has continued to decline from a functional status. Yesterday, while visiting the patient at [facility], son witnessed her suddenly slump forward in her wheelchair and become unresponsive. Son walked into the hallway to get assistance. Upon re-entering the room, patient regained consciousness and returned to baseline. Per son, immediately prior to the episode, patient said "I don't feel good". Today, son was told that a similar episode occurred at [facility], but this time associated with slurred speech. Patient does not recall the episodes, but denies any urinary incontinence or tongue bite. Son states that patient has continued to have poor PO intake and generalized weakness. Neurology was consulted for further evaluation.    Impression  - Patient presents after 2 separate syncopal episodes. Episodes occurred while patient was sitting down in her wheelchair. Son states that patient "went stiff" during one of the episodes but he denies any convulsive activity. Patient was recently admitted for poor PO intake and generalized weakness. Course was complicated by LBBB and troponemia. Today, labs again show troponemia. Labs are also notable for PAMELA on CKD (Cr 1.7 on recent discharge). CTH was performed and is negative for acute abnormality. CTA H/N pending. Patient is currently back to baseline. Neuro exam is nonfocal. Differential includes cardiac syncope vs toxic metabolic encephalopathy in the setting of uremia and poor PO intake vs seizure.    Recommendations  - F/u CTA H/N  - Obtain REEG  - Medicine consult for troponemia and PAMELA  - Obtain urinalysis 77F with a PMH of HTN, CKD 3, hypothyroidism, kidney stone , pre-DM, arthritis, and recent discharge from [facility] on 5/20 for after being admitted for generalized weakness and poor PO intake (course was complicated by EGD which showed esophageal ring obstruction, gastritis, and antral ulcers, troponemia, and LBBB), who presents today after a syncopal episode. Per patient's son (present at bedside), patient was discharged to [facility] after recent hospitalization where she has continued to decline from a functional status. Yesterday, while visiting the patient at [facility], son witnessed her suddenly slump forward in her wheelchair and become unresponsive. Son walked into the hallway to get assistance. Upon re-entering the room, patient regained consciousness and returned to baseline. Per son, immediately prior to the episode, patient said "I don't feel good". Today, son was told that a similar episode occurred at [facility], but this time associated with slurred speech. Patient does not recall the episodes, but denies any urinary incontinence or tongue bite. Son states that patient has continued to have poor PO intake and generalized weakness. Neurology was consulted for further evaluation.    Impression  - Patient presents after 2 separate syncopal episodes. Episodes occurred while patient was sitting down in her wheelchair. Son states that patient "went stiff" during one of the episodes but he denies any convulsive activity. Patient was recently admitted for poor PO intake and generalized weakness. Course was complicated by LBBB and troponemia. Today, labs again show troponemia. Labs are also notable for PAMELA on CKD (Cr 1.7 on recent discharge). CTH was performed and is negative for acute abnormality. CTA H/N pending. Patient is currently back to baseline. Neuro exam is nonfocal. Differential includes cardiac syncope vs toxic metabolic encephalopathy in the setting of uremia and poor PO intake vs seizure.    Recommendations  - F/u CTA H/N  - Obtain REEG  - Obtain serum lactate  - Medicine consult for troponemia and PAMELA  - Obtain urinalysis  - Seizure precautions

## 2023-06-04 NOTE — ED ADULT NURSE NOTE - OBJECTIVE STATEMENT
BIBEMS from [facility] for an episode of altered mental status and slurred speech ~1230pm today. As per MD Erazo, "no need for stroke code."

## 2023-06-05 LAB
ALBUMIN SERPL ELPH-MCNC: 3.3 G/DL — LOW (ref 3.5–5.2)
ALP SERPL-CCNC: 52 U/L — SIGNIFICANT CHANGE UP (ref 30–115)
ALT FLD-CCNC: 6 U/L — SIGNIFICANT CHANGE UP (ref 0–41)
ANION GAP SERPL CALC-SCNC: 16 MMOL/L — HIGH (ref 7–14)
APPEARANCE UR: CLEAR — SIGNIFICANT CHANGE UP
APTT BLD: 40.3 SEC — HIGH (ref 27–39.2)
AST SERPL-CCNC: 11 U/L — SIGNIFICANT CHANGE UP (ref 0–41)
BASOPHILS # BLD AUTO: 0.04 K/UL — SIGNIFICANT CHANGE UP (ref 0–0.2)
BASOPHILS NFR BLD AUTO: 0.4 % — SIGNIFICANT CHANGE UP (ref 0–1)
BILIRUB SERPL-MCNC: 0.4 MG/DL — SIGNIFICANT CHANGE UP (ref 0.2–1.2)
BILIRUB UR-MCNC: NEGATIVE — SIGNIFICANT CHANGE UP
BUN SERPL-MCNC: 39 MG/DL — HIGH (ref 10–20)
CALCIUM SERPL-MCNC: 10.7 MG/DL — HIGH (ref 8.4–10.5)
CHLORIDE SERPL-SCNC: 104 MMOL/L — SIGNIFICANT CHANGE UP (ref 98–110)
CO2 SERPL-SCNC: 20 MMOL/L — SIGNIFICANT CHANGE UP (ref 17–32)
COLOR SPEC: YELLOW — SIGNIFICANT CHANGE UP
CREAT ?TM UR-MCNC: 145 MG/DL — SIGNIFICANT CHANGE UP
CREAT SERPL-MCNC: 2.3 MG/DL — HIGH (ref 0.7–1.5)
DIFF PNL FLD: NEGATIVE — SIGNIFICANT CHANGE UP
EGFR: 21 ML/MIN/1.73M2 — LOW
EOSINOPHIL # BLD AUTO: 0.14 K/UL — SIGNIFICANT CHANGE UP (ref 0–0.7)
EOSINOPHIL NFR BLD AUTO: 1.5 % — SIGNIFICANT CHANGE UP (ref 0–8)
GLUCOSE SERPL-MCNC: 87 MG/DL — SIGNIFICANT CHANGE UP (ref 70–99)
GLUCOSE UR QL: NEGATIVE — SIGNIFICANT CHANGE UP
HCT VFR BLD CALC: 38.2 % — SIGNIFICANT CHANGE UP (ref 37–47)
HGB BLD-MCNC: 12.4 G/DL — SIGNIFICANT CHANGE UP (ref 12–16)
IMM GRANULOCYTES NFR BLD AUTO: 0.7 % — HIGH (ref 0.1–0.3)
KETONES UR-MCNC: NEGATIVE — SIGNIFICANT CHANGE UP
LACTATE SERPL-SCNC: 1.1 MMOL/L — SIGNIFICANT CHANGE UP (ref 0.7–2)
LEUKOCYTE ESTERASE UR-ACNC: NEGATIVE — SIGNIFICANT CHANGE UP
LYMPHOCYTES # BLD AUTO: 1.95 K/UL — SIGNIFICANT CHANGE UP (ref 1.2–3.4)
LYMPHOCYTES # BLD AUTO: 21.3 % — SIGNIFICANT CHANGE UP (ref 20.5–51.1)
MAGNESIUM SERPL-MCNC: 2.7 MG/DL — HIGH (ref 1.8–2.4)
MAGNESIUM SERPL-MCNC: 2.8 MG/DL — HIGH (ref 1.8–2.4)
MCHC RBC-ENTMCNC: 32.5 G/DL — SIGNIFICANT CHANGE UP (ref 32–37)
MCHC RBC-ENTMCNC: 32.5 PG — HIGH (ref 27–31)
MCV RBC AUTO: 100 FL — HIGH (ref 81–99)
MONOCYTES # BLD AUTO: 0.67 K/UL — HIGH (ref 0.1–0.6)
MONOCYTES NFR BLD AUTO: 7.3 % — SIGNIFICANT CHANGE UP (ref 1.7–9.3)
NEUTROPHILS # BLD AUTO: 6.31 K/UL — SIGNIFICANT CHANGE UP (ref 1.4–6.5)
NEUTROPHILS NFR BLD AUTO: 68.8 % — SIGNIFICANT CHANGE UP (ref 42.2–75.2)
NITRITE UR-MCNC: NEGATIVE — SIGNIFICANT CHANGE UP
NRBC # BLD: 0 /100 WBCS — SIGNIFICANT CHANGE UP (ref 0–0)
OSMOLALITY UR: 503 MOS/KG — SIGNIFICANT CHANGE UP (ref 50–1200)
PH UR: 6 — SIGNIFICANT CHANGE UP (ref 5–8)
PHOSPHATE SERPL-MCNC: 3.1 MG/DL — SIGNIFICANT CHANGE UP (ref 2.1–4.9)
PLATELET # BLD AUTO: 275 K/UL — SIGNIFICANT CHANGE UP (ref 130–400)
PMV BLD: 11.9 FL — HIGH (ref 7.4–10.4)
POTASSIUM SERPL-MCNC: 4.7 MMOL/L — SIGNIFICANT CHANGE UP (ref 3.5–5)
POTASSIUM SERPL-SCNC: 4.7 MMOL/L — SIGNIFICANT CHANGE UP (ref 3.5–5)
PROT SERPL-MCNC: 5.9 G/DL — LOW (ref 6–8)
PROT UR-MCNC: SIGNIFICANT CHANGE UP
RAPID RVP RESULT: DETECTED
RBC # BLD: 3.82 M/UL — LOW (ref 4.2–5.4)
RBC # FLD: 12.3 % — SIGNIFICANT CHANGE UP (ref 11.5–14.5)
SARS-COV-2 RNA SPEC QL NAA+PROBE: DETECTED
SODIUM SERPL-SCNC: 140 MMOL/L — SIGNIFICANT CHANGE UP (ref 135–146)
SODIUM UR-SCNC: 69 MMOL/L — SIGNIFICANT CHANGE UP
SP GR SPEC: 1.01 — SIGNIFICANT CHANGE UP (ref 1.01–1.03)
TROPONIN T SERPL-MCNC: 0.02 NG/ML — HIGH
TROPONIN T SERPL-MCNC: 0.02 NG/ML — HIGH
UROBILINOGEN FLD QL: SIGNIFICANT CHANGE UP
UUN UR-MCNC: 618 MG/DL — SIGNIFICANT CHANGE UP
WBC # BLD: 9.17 K/UL — SIGNIFICANT CHANGE UP (ref 4.8–10.8)
WBC # FLD AUTO: 9.17 K/UL — SIGNIFICANT CHANGE UP (ref 4.8–10.8)

## 2023-06-05 PROCEDURE — 93010 ELECTROCARDIOGRAM REPORT: CPT

## 2023-06-05 PROCEDURE — 99222 1ST HOSP IP/OBS MODERATE 55: CPT

## 2023-06-05 PROCEDURE — 71045 X-RAY EXAM CHEST 1 VIEW: CPT | Mod: 26

## 2023-06-05 RX ORDER — HEPARIN SODIUM 5000 [USP'U]/ML
2000 INJECTION INTRAVENOUS; SUBCUTANEOUS EVERY 6 HOURS
Refills: 0 | Status: DISCONTINUED | OUTPATIENT
Start: 2023-06-05 | End: 2023-06-06

## 2023-06-05 RX ORDER — HEPARIN SODIUM 5000 [USP'U]/ML
4500 INJECTION INTRAVENOUS; SUBCUTANEOUS ONCE
Refills: 0 | Status: COMPLETED | OUTPATIENT
Start: 2023-06-05 | End: 2023-06-05

## 2023-06-05 RX ORDER — HEPARIN SODIUM 5000 [USP'U]/ML
4500 INJECTION INTRAVENOUS; SUBCUTANEOUS EVERY 6 HOURS
Refills: 0 | Status: DISCONTINUED | OUTPATIENT
Start: 2023-06-05 | End: 2023-06-06

## 2023-06-05 RX ORDER — HEPARIN SODIUM 5000 [USP'U]/ML
INJECTION INTRAVENOUS; SUBCUTANEOUS
Qty: 25000 | Refills: 0 | Status: DISCONTINUED | OUTPATIENT
Start: 2023-06-05 | End: 2023-06-06

## 2023-06-05 RX ORDER — SODIUM CHLORIDE 9 MG/ML
1000 INJECTION, SOLUTION INTRAVENOUS
Refills: 0 | Status: DISCONTINUED | OUTPATIENT
Start: 2023-06-05 | End: 2023-06-06

## 2023-06-05 RX ADMIN — Medication 50 MILLIGRAM(S): at 06:31

## 2023-06-05 RX ADMIN — SODIUM CHLORIDE 75 MILLILITER(S): 9 INJECTION, SOLUTION INTRAVENOUS at 00:31

## 2023-06-05 RX ADMIN — MEGESTROL ACETATE 20 MILLIGRAM(S): 40 SUSPENSION ORAL at 12:30

## 2023-06-05 RX ADMIN — HEPARIN SODIUM 4500 UNIT(S): 5000 INJECTION INTRAVENOUS; SUBCUTANEOUS at 16:42

## 2023-06-05 RX ADMIN — MIRTAZAPINE 7.5 MILLIGRAM(S): 45 TABLET, ORALLY DISINTEGRATING ORAL at 23:32

## 2023-06-05 RX ADMIN — PANTOPRAZOLE SODIUM 40 MILLIGRAM(S): 20 TABLET, DELAYED RELEASE ORAL at 06:31

## 2023-06-05 RX ADMIN — HEPARIN SODIUM 1100 UNIT(S)/HR: 5000 INJECTION INTRAVENOUS; SUBCUTANEOUS at 16:41

## 2023-06-05 RX ADMIN — MAGNESIUM OXIDE 400 MG ORAL TABLET 400 MILLIGRAM(S): 241.3 TABLET ORAL at 12:31

## 2023-06-05 NOTE — PROGRESS NOTE ADULT - ASSESSMENT
77F with a PMH of HTN, CKD 3 ( baseline 1.6-1.7 ), hypothyroidism, kidney stones, arthritis, recent discharge from [facility] on 5/20 for after extensive hospital course, who presents after a syncopal episode. Currently HDS, afebrile and satting well on RA.    #Syncope   # r/o vasovagal/ r/o seizures   - CTH -ve, CTA H&N -ve   - poor PO intake and dryness on PE, BP borderline; can be vasovagal?  - monitor on tele   - rEEG   - orthostatic BP     # Leukocytosis  - 12k-->9k  - unsure etiology; reactive?  - trend on serial CBC  - monitor off abx    #PAMELA on CKD   #Probably pre-renal   #Hx of non-obstructive stones   - Cr 2.9 ( baseline 1.7 last admission ) -->2.3  - told nurse to straight cath and place nieves if > 350 come out   ( pt is currently in hallway )   - gentle hydration LR 75 cc/hr  - hold losartan-HCTZ    - trend Cr   - pt received contrast for CTA H&N today     #Troponemia   - trop 0.03--> 0.02; stable  - EKG showed no ischemic changes, pt asymptomatic   - likely NSTEMI type 2(demand ischemia and decreased renal clearance)  - last TTE 5/17/23: EF 70%, GIDD   - seen by cardio last admission for trop 0.04 and LBB: recommended stress test as OP but did not do. reiterate again upon discharge  - c/w tele monitoring     #New DVT   - no documentation of duplex results in NH chart   - pt was on lovenox 80 mg BID despite low GFR   - f/u bilateral LE duplex -PENDING  - received AM lovenox in NH   - started heparin ggt  until dupplex is back.    #Hx of recent covid   - asymptomatic currently     #Generalized weakness   #Decreased PO intake   - will resume regular diet ( S&S eval 2 weeks ago )   - c/w megestrol and mirtazepine   - will start calorie count      #Hypovolemic Hyponatremia , RESOLVED  - Na 134-->140  - f/u after hydration     #Hx of HTN   - BP borderline low   - hold amlodipine and losartan-HCTZ     #Hypothyroidism   - on levothyroxine 25 mcg once weekly on Tuesday   - TSH 2.85 (5/17/23)     #DVT ppx: start heparin ggt  #GI ppx: pantoprazole   #Diet: regular   #Dispo: tele     #Progress Note Handoff  Pending (specify):  syncope work up, follow up repeat labs, dupplex to assess VTE, PT eval  Disposition: HUnknown at this time________

## 2023-06-05 NOTE — PROGRESS NOTE ADULT - SUBJECTIVE AND OBJECTIVE BOX
ELIAZAR WINTERS  77y  Female      Patient is a 77y old  Female who presents with a chief complaint of syncope episode (2023 23:35)      INTERVAL HPI/OVERNIGHT EVENTS:      REVIEW OF SYSTEMS:  CONSTITUTIONAL: No fever, weight loss, or fatigue  RESPIRATORY: No cough, wheezing, chills or hemoptysis; No shortness of breath  CARDIOVASCULAR: No chest pain, palpitations, dizziness, or leg swelling  GASTROINTESTINAL: No abdominal or epigastric pain. No nausea, vomiting, or hematemesis; No diarrhea or constipation. No melena or hematochezia.  GENITOURINARY: No dysuria, frequency, hematuria, or incontinence  NEUROLOGICAL: No headaches, memory loss, loss of strength, numbness, or tremors  SKIN: No itching, burning, rashes, or lesions   MUSCULOSKELETAL: No joint pain or swelling; No muscle, back, or extremity pain  PSYCHIATRIC: No depression, anxiety, mood swings, or difficulty sleeping  All other review of systems negative    T(C): 36.8 (23 @ 16:08), Max: 36.8 (23 @ 16:08)  HR: 66 (23 @ 12:02) (59 - 66)  BP: 128/79 (23 @ 12:02) (112/57 - 128/79)  RR: 18 (23 @ 12:02) (17 - 18)  SpO2: 99% (23 @ 12:02) (94% - 99%)  Wt(kg): --Vital Signs Last 24 Hrs  T(C): 36.8 (2023 16:08), Max: 36.8 (2023 16:08)  T(F): 98.2 (2023 16:08), Max: 98.2 (2023 16:08)  HR: 66 (2023 12:02) (59 - 66)  BP: 128/79 (2023 12:02) (112/57 - 128/79)  BP(mean): --  RR: 18 (2023 12:02) (17 - 18)  SpO2: 99% (2023 12:02) (94% - 99%)    Parameters below as of 2023 06:30  Patient On (Oxygen Delivery Method): room air          23 @ 07:01  -  23 @ 07:00  --------------------------------------------------------  IN: 0 mL / OUT: 420 mL / NET: -420 mL        PHYSICAL EXAM:  GENERAL: NAD, well-groomed, well-developed  PSYCH: no agitation, baseline mentation  NERVOUS SYSTEM:  Alert & Oriented X3, Motor Strength 5/5 B/L upper and lower extremities; Sensory intact; FTN WNL  PULMONARY: Clear to percussion bilaterally; No rales, rhonchi, wheezing, or rubs  CARDIOVASCULAR: Regular rate and rhythm; No murmurs, rubs, or gallops  GI: Soft, Nontender, Nondistended; Bowel sounds present  EXTREMITIES:  2+ Peripheral Pulses, No clubbing, cyanosis, or edema  LYMPH: No lymphadenopathy noted  SKIN: No rashes or lesions    Consultant(s) Notes Reviewed:  [x ] YES  [ ] NO    Discussed with Consultants/Other Providers [ x] YES     LABS                          12.4   9.17  )-----------( 275      ( 2023 06:17 )             38.2     06-05    140  |  104  |  39<H>  ----------------------------<  87  4.7   |  20  |  2.3<H>    Ca    10.7<H>      2023 06:17  Phos  3.1     06-05  Mg     2.7     06-05    TPro  5.9<L>  /  Alb  3.3<L>  /  TBili  0.4  /  DBili  x   /  AST  11  /  ALT  6   /  AlkPhos  52  06-05      Urinalysis Basic - ( 2023 01:45 )    Color: Yellow / Appearance: Clear / S.015 / pH: x  Gluc: x / Ketone: Negative  / Bili: Negative / Urobili: <2 mg/dL   Blood: x / Protein: Trace / Nitrite: Negative   Leuk Esterase: Negative / RBC: x / WBC x   Sq Epi: x / Non Sq Epi: x / Bacteria: x      PT/INR - ( 2023 14:33 )   PT: 13.60 sec;   INR: 1.19 ratio    PTT - ( 2023 14:33 )  PTT:36.7 sec  Lactate Trend  - @ 23:45 Lactate:1.1     CARDIAC MARKERS ( 2023 06:17 )  x     / 0.02 ng/mL / x     / x     / x      CARDIAC MARKERS ( 2023 23:45 )  x     / 0.02 ng/mL / x     / x     / x      CARDIAC MARKERS ( 2023 14:33 )  x     / 0.03 ng/mL / x     / x     / x        POCT Blood Glucose.: 151 mg/dL (2023 14:21)    RADIOLOGY & ADDITIONAL TESTS:  < from: Xray Chest 1 View-PORTABLE IMMEDIATE (Xray Chest 1 View-PORTABLE IMMEDIATE .) (23 @ 01:17) >  IMPRESSION:    No radiographic evidence of acute pulmonary disease.    < end of copied text >  < from: CT Angio Neck w/ IV Cont (23 @ 16:52) >  IMPRESSION:    CTA HEAD:  No evidence of flow-limiting stenosis, occlusion or aneurysm.    CTA NECK:  Mild atherosclerotic stenosis of the right proximal ICA.    _____________  NASCET criteria for internal carotid artery stenosis:  Mild: 0% to 49%  Moderate: 50% to 69%  Severe: 70% to 99%  Complete Occlusion      ATTENDING ADDENDUM:  Agree with above. 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    --- End of Report ---      MEDICATIONS  (STANDING):  lactated ringers. 1000 milliLiter(s) (75 mL/Hr) IV Continuous <Continuous>  levothyroxine 25 MICROGram(s) Oral <User Schedule>  magnesium oxide 400 milliGRAM(s) Oral daily  megestrol 20 milliGRAM(s) Oral daily  metoprolol succinate ER 50 milliGRAM(s) Oral daily  mirtazapine 7.5 milliGRAM(s) Oral at bedtime  pantoprazole    Tablet 40 milliGRAM(s) Oral before breakfast        Imaging Personally Reviewed:  [ ] YES  [ x] NO             ELIAZAR WINTERS  77y  Female      Patient is a 77y old  Female who presents with a chief complaint of syncope episode (2023 23:35)      INTERVAL HPI/OVERNIGHT EVENTS: no acute events overnight. patient endoring decreased PO intake. unable to articulate if she has lost significant weight los over same time period. denies any belly pain, n/v.      REVIEW OF SYSTEMS:  CONSTITUTIONAL: No fever, weight loss, or fatigue  RESPIRATORY: No cough, wheezing, chills or hemoptysis; No shortness of breath  CARDIOVASCULAR: No chest pain, palpitations, dizziness, or leg swelling  GASTROINTESTINAL: No abdominal or epigastric pain. No nausea, vomiting, or hematemesis; No diarrhea or constipation. No melena or hematochezia.  GENITOURINARY: No dysuria, frequency, hematuria, or incontinence  NEUROLOGICAL: No headaches, memory loss, loss of strength, numbness, or tremors  SKIN: No itching, burning, rashes, or lesions   MUSCULOSKELETAL: No joint pain or swelling; No muscle, back, or extremity pain  PSYCHIATRIC: No depression, anxiety, mood swings, or difficulty sleeping  All other review of systems negative    T(C): 36.8 (23 @ 16:08), Max: 36.8 (23 @ 16:08)  HR: 66 (23 @ 12:02) (59 - 66)  BP: 128/79 (23 @ 12:02) (112/57 - 128/79)  RR: 18 (23 @ 12:02) (17 - 18)  SpO2: 99% (23 @ 12:02) (94% - 99%)  Wt(kg): --Vital Signs Last 24 Hrs  T(C): 36.8 (2023 16:08), Max: 36.8 (2023 16:08)  T(F): 98.2 (2023 16:08), Max: 98.2 (2023 16:08)  HR: 66 (2023 12:02) (59 - 66)  BP: 128/79 (2023 12:02) (112/57 - 128/79)  BP(mean): --  RR: 18 (2023 12:02) (17 - 18)  SpO2: 99% (2023 12:02) (94% - 99%)    Parameters below as of 2023 06:30  Patient On (Oxygen Delivery Method): room air          23 @ 07:01  -  23 @ 07:00  --------------------------------------------------------  IN: 0 mL / OUT: 420 mL / NET: -420 mL        PHYSICAL EXAM:  GENERAL: elderly F, frail appearing, NAD, non toxic  PSYCH: no agitation, baseline mentation  NERVOUS SYSTEM:  Alert & Oriented X3, Motor Strength 5/5 B/L upper and lower extremities; Sensory intact; FTN WNL  PULMONARY: Clear to percussion bilaterally; No rales, rhonchi, wheezing, or rubs  CARDIOVASCULAR: Regular rate and rhythm; No murmurs, rubs, or gallops  GI: protuberant, soft, non ttp  EXTREMITIES:  2+ Peripheral Pulses, No clubbing, cyanosis, or edema  LYMPH: No lymphadenopathy noted  SKIN: No rashes or lesions    Consultant(s) Notes Reviewed:  [x ] YES  [ ] NO    Discussed with Consultants/Other Providers [ x] YES     LABS                          12.4   9.17  )-----------( 275      ( 2023 06:17 )             38.2     06-05    140  |  104  |  39<H>  ----------------------------<  87  4.7   |  20  |  2.3<H>    Ca    10.7<H>      2023 06:17  Phos  3.1     06-05  Mg     2.7     06-05    TPro  5.9<L>  /  Alb  3.3<L>  /  TBili  0.4  /  DBili  x   /  AST  11  /  ALT  6   /  AlkPhos  52  06-05      Urinalysis Basic - ( 2023 01:45 )    Color: Yellow / Appearance: Clear / S.015 / pH: x  Gluc: x / Ketone: Negative  / Bili: Negative / Urobili: <2 mg/dL   Blood: x / Protein: Trace / Nitrite: Negative   Leuk Esterase: Negative / RBC: x / WBC x   Sq Epi: x / Non Sq Epi: x / Bacteria: x      PT/INR - ( 2023 14:33 )   PT: 13.60 sec;   INR: 1.19 ratio    PTT - ( 2023 14:33 )  PTT:36.7 sec  Lactate Trend  - @ 23:45 Lactate:1.1     CARDIAC MARKERS ( 2023 06:17 )  x     / 0.02 ng/mL / x     / x     / x      CARDIAC MARKERS ( 2023 23:45 )  x     / 0.02 ng/mL / x     / x     / x      CARDIAC MARKERS ( 2023 14:33 )  x     / 0.03 ng/mL / x     / x     / x        POCT Blood Glucose.: 151 mg/dL (2023 14:21)    RADIOLOGY & ADDITIONAL TESTS:  < from: Xray Chest 1 View-PORTABLE IMMEDIATE (Xray Chest 1 View-PORTABLE IMMEDIATE .) (23 @ 01:17) >  IMPRESSION:    No radiographic evidence of acute pulmonary disease.    < end of copied text >  < from: CT Angio Neck w/ IV Cont (23 @ 16:52) >  IMPRESSION:    CTA HEAD:  No evidence of flow-limiting stenosis, occlusion or aneurysm.    CTA NECK:  Mild atherosclerotic stenosis of the right proximal ICA.    _____________  NASCET criteria for internal carotid artery stenosis:  Mild: 0% to 49%  Moderate: 50% to 69%  Severe: 70% to 99%  Complete Occlusion      ATTENDING ADDENDUM:  Agree with above. 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    --- End of Report ---      MEDICATIONS  (STANDING):  lactated ringers. 1000 milliLiter(s) (75 mL/Hr) IV Continuous <Continuous>  levothyroxine 25 MICROGram(s) Oral <User Schedule>  magnesium oxide 400 milliGRAM(s) Oral daily  megestrol 20 milliGRAM(s) Oral daily  metoprolol succinate ER 50 milliGRAM(s) Oral daily  mirtazapine 7.5 milliGRAM(s) Oral at bedtime  pantoprazole    Tablet 40 milliGRAM(s) Oral before breakfast        Imaging Personally Reviewed:  [ ] YES  [ x] NO

## 2023-06-06 LAB
APTT BLD: 97 SEC — CRITICAL HIGH (ref 27–39.2)
APTT BLD: 99.4 SEC — CRITICAL HIGH (ref 27–39.2)
HCT VFR BLD CALC: 36.8 % — LOW (ref 37–47)
HGB BLD-MCNC: 12 G/DL — SIGNIFICANT CHANGE UP (ref 12–16)
MCHC RBC-ENTMCNC: 32.6 G/DL — SIGNIFICANT CHANGE UP (ref 32–37)
MCHC RBC-ENTMCNC: 33 PG — HIGH (ref 27–31)
MCV RBC AUTO: 101.1 FL — HIGH (ref 81–99)
NRBC # BLD: 0 /100 WBCS — SIGNIFICANT CHANGE UP (ref 0–0)
PLATELET # BLD AUTO: 263 K/UL — SIGNIFICANT CHANGE UP (ref 130–400)
PMV BLD: 10.9 FL — HIGH (ref 7.4–10.4)
RBC # BLD: 3.64 M/UL — LOW (ref 4.2–5.4)
RBC # FLD: 12.5 % — SIGNIFICANT CHANGE UP (ref 11.5–14.5)
WBC # BLD: 8.13 K/UL — SIGNIFICANT CHANGE UP (ref 4.8–10.8)
WBC # FLD AUTO: 8.13 K/UL — SIGNIFICANT CHANGE UP (ref 4.8–10.8)

## 2023-06-06 PROCEDURE — 95819 EEG AWAKE AND ASLEEP: CPT | Mod: 26

## 2023-06-06 PROCEDURE — 93970 EXTREMITY STUDY: CPT | Mod: 26

## 2023-06-06 PROCEDURE — 99233 SBSQ HOSP IP/OBS HIGH 50: CPT

## 2023-06-06 PROCEDURE — 76770 US EXAM ABDO BACK WALL COMP: CPT | Mod: 26

## 2023-06-06 RX ORDER — HEPARIN SODIUM 5000 [USP'U]/ML
4500 INJECTION INTRAVENOUS; SUBCUTANEOUS ONCE
Refills: 0 | Status: COMPLETED | OUTPATIENT
Start: 2023-06-06 | End: 2023-06-06

## 2023-06-06 RX ORDER — APIXABAN 2.5 MG/1
2 TABLET, FILM COATED ORAL
Qty: 0 | Refills: 0 | DISCHARGE
Start: 2023-06-06 | End: 2023-06-13

## 2023-06-06 RX ORDER — APIXABAN 2.5 MG/1
10 TABLET, FILM COATED ORAL
Refills: 0 | Status: DISCONTINUED | OUTPATIENT
Start: 2023-06-06 | End: 2023-06-07

## 2023-06-06 RX ORDER — HEPARIN SODIUM 5000 [USP'U]/ML
4500 INJECTION INTRAVENOUS; SUBCUTANEOUS EVERY 6 HOURS
Refills: 0 | Status: DISCONTINUED | OUTPATIENT
Start: 2023-06-06 | End: 2023-06-06

## 2023-06-06 RX ORDER — HEPARIN SODIUM 5000 [USP'U]/ML
1200 INJECTION INTRAVENOUS; SUBCUTANEOUS
Qty: 25000 | Refills: 0 | Status: DISCONTINUED | OUTPATIENT
Start: 2023-06-06 | End: 2023-06-06

## 2023-06-06 RX ORDER — HEPARIN SODIUM 5000 [USP'U]/ML
2000 INJECTION INTRAVENOUS; SUBCUTANEOUS EVERY 6 HOURS
Refills: 0 | Status: DISCONTINUED | OUTPATIENT
Start: 2023-06-06 | End: 2023-06-06

## 2023-06-06 RX ADMIN — MAGNESIUM OXIDE 400 MG ORAL TABLET 400 MILLIGRAM(S): 241.3 TABLET ORAL at 11:10

## 2023-06-06 RX ADMIN — PANTOPRAZOLE SODIUM 40 MILLIGRAM(S): 20 TABLET, DELAYED RELEASE ORAL at 06:34

## 2023-06-06 RX ADMIN — HEPARIN SODIUM 4500 UNIT(S): 5000 INJECTION INTRAVENOUS; SUBCUTANEOUS at 00:48

## 2023-06-06 RX ADMIN — APIXABAN 10 MILLIGRAM(S): 2.5 TABLET, FILM COATED ORAL at 21:58

## 2023-06-06 RX ADMIN — MEGESTROL ACETATE 20 MILLIGRAM(S): 40 SUSPENSION ORAL at 11:10

## 2023-06-06 RX ADMIN — MIRTAZAPINE 7.5 MILLIGRAM(S): 45 TABLET, ORALLY DISINTEGRATING ORAL at 21:59

## 2023-06-06 RX ADMIN — Medication 25 MICROGRAM(S): at 06:34

## 2023-06-06 RX ADMIN — HEPARIN SODIUM 1200 UNIT(S)/HR: 5000 INJECTION INTRAVENOUS; SUBCUTANEOUS at 09:08

## 2023-06-06 RX ADMIN — HEPARIN SODIUM 1200 UNIT(S)/HR: 5000 INJECTION INTRAVENOUS; SUBCUTANEOUS at 00:48

## 2023-06-06 RX ADMIN — HEPARIN SODIUM 1200 UNIT(S)/HR: 5000 INJECTION INTRAVENOUS; SUBCUTANEOUS at 00:39

## 2023-06-06 RX ADMIN — HEPARIN SODIUM 1200 UNIT(S)/HR: 5000 INJECTION INTRAVENOUS; SUBCUTANEOUS at 14:24

## 2023-06-06 NOTE — PROGRESS NOTE ADULT - SUBJECTIVE AND OBJECTIVE BOX
CHIEF COMPLAINT:    Patient is a 77y old  Female who presents with a chief complaint of syncope episode     INTERVAL HPI/OVERNIGHT EVENTS:    Patient seen and examined at bedside. No acute overnight events occurred.    ROS: Denies SOB, chest pain. All other systems are negative.    Medications:  Standing  heparin  Infusion. 1200 Unit(s)/Hr IV Continuous <Continuous>  lactated ringers. 1000 milliLiter(s) IV Continuous <Continuous>  levothyroxine 25 MICROGram(s) Oral <User Schedule>  magnesium oxide 400 milliGRAM(s) Oral daily  megestrol 20 milliGRAM(s) Oral daily  metoprolol succinate ER 50 milliGRAM(s) Oral daily  mirtazapine 7.5 milliGRAM(s) Oral at bedtime  pantoprazole    Tablet 40 milliGRAM(s) Oral before breakfast    PRN Meds  heparin   Injectable 2000 Unit(s) IV Push every 6 hours PRN  heparin   Injectable 4500 Unit(s) IV Push every 6 hours PRN        Vital Signs:    T(F): 97.2 (23 @ 14:31), Max: 97.9 (23 @ 01:27)  HR: 58 (23 @ 14:31) (50 - 58)  BP: 108/51 (23 @ 14:31) (108/51 - 119/60)  RR: 18 (23 @ 14:31) (18 - 18)  SpO2: 98% (23 @ 01:27) (98% - 98%)  I&O's Summary    2023 07:  -  2023 07:00  --------------------------------------------------------  IN: 87 mL / OUT: 0 mL / NET: 87 mL    2023 07:  -  2023 16:14  --------------------------------------------------------  IN: 839 mL / OUT: 0 mL / NET: 839 mL      Daily     Daily Weight in k (2023 05:26)  CAPILLARY BLOOD GLUCOSE          PHYSICAL EXAM:  GENERAL:  NAD  SKIN: No rashes or lesions  HEENT: Atraumatic. Normocephalic. Anicteric  NECK:  No JVD.   PULMONARY: Clear to ausculation bilaterally. No wheezing. No rales  CVS: Normal S1, S2. Regular rate and rhythm. No murmurs.  ABDOMEN/GI: Soft, Nontender, Nondistended; Bowel sounds are present  EXTREMITIES:  No edema B/L LE.  NEUROLOGIC:  No motor deficit.  PSYCH: Alert & oriented x 3, normal affect      LABS:                        12.0   8.13  )-----------( 263      ( 2023 06:24 )             36.8     06-05    140  |  104  |  39<H>  ----------------------------<  87  4.7   |  20  |  2.3<H>    Ca    10.7<H>      2023 06:17  Phos  3.1     06-05  Mg     2.7     06-05    TPro  5.9<L>  /  Alb  3.3<L>  /  TBili  0.4  /  DBili  x   /  AST  11  /  ALT  6   /  AlkPhos  52  06-05    PTT - ( 2023 06:18 )  PTT:99.4 sec    Trop 0.02, CKMB --, CK --, 23 @ 06:17  Trop 0.02, CKMB --, CK --, 23 @ 23:45  Trop 0.03, CKMB --, CK --, 23 @ 14:33        RADIOLOGY & ADDITIONAL TESTS:  Imaging or report Personally Reviewed:  [ ] YES  [ ] NO -->no new images    Telemetry reviewed independently - NSR, no acute events  EKG reviewed independently -->no new EKGs    Consultant(s) Notes Reviewed:  [ ] YES  [ ] NO  Care Discussed with Consultants/Other Providers [ ] YES  [ ] NO    Case discussed with resident  Care discussed with pt

## 2023-06-06 NOTE — CHART NOTE - NSCHARTNOTEFT_GEN_A_CORE
Infection control confirmed COVID results on 5/23/23 at [facility]. COVID isolation will be discontinued
3 Day Calorie count initiated from 6/6 to 6/8; RD posted calorie count -- covering RN aware. RD to f/u on results of calorie count on 6/9     RD to remain available: Day Zuniga, ALVIN [phone] or via Teams

## 2023-06-06 NOTE — PROGRESS NOTE ADULT - ASSESSMENT
76 yo F PMHx HTN, CKD III ( baseline 1.6-1.7 ), hypothyroidism, kidney stones, arthritis, recent discharge from [facility] on 5/20 for after extensive hospital course where she was treated for decreased appetite, found to have circumferential esophageal thickening, who presented for evaluation of syncope.      Syncope   - likely vasovagal due to dehydration from decreased PO intake  - < from: EEG (06.05.23 @ 12:00) >  Impression: Abnormal due to the presence of :  Focal and generalized slowing as described above  Clinical Correlation:  Focal and diffuse electrocerebral dysfunction secondary to   structural/epileptiform/nonspecific etiology  It is of note that the recording contains significant amount of movement   artifact  - < from: TTE Echo Complete w/o Contrast w/ Doppler (05.17.23 @ 09:18) >  Normal global left ventricular systolic function.  LV Ejection Fraction by Kwan's Method with a biplane EF of 70 %.  Abnormal septal motion consistent with left bundle branch block.  Grade I diastolic dysfunction  mild left ventricular hypertrophy.  Right atrial size not well visualized.  Mild aortic regurgitation.  Mild tricuspid regurgitation.  dilatation of the aortic root.  borderline pulmonary hypertension.  There is a significant pericardial fat pad present. Cannot rule out   small effusion.  - suspect vasovagal from dehydration due to poor PO intake due to hiatal herania    Decreased PO intake  - had extensive work up on prior admission  - EGD showed:  The upperand mid esophagus were normal. The distil esophagus revealed a ring  that was 100% circumferential and 5 % obstructing. Biopsies of the mid and  distil esophagus were taken for EoE and the ring was biopsied in 4 quadrants.  The ring was located at 39cm and was above a moderate sized hiatal hernia.  Erythema in the whole stomach compatible with gastritis. (Biopsy).  Ulcers in the Antrum.  Normal mucosa in the whole examined duodenum. (Biopsy).  - suspect hiatal hernia causing smaller gastric area leading to decreased appetitie  - calorie count      Leukocytosis  - 12k-->9k  - likely reactive    PAMELA on CKD   Likely pre-renal   Non-obstructive stones   - Cr 2.9 ( baseline 1.7 last admission ) -->2.3  - SCr downtrended, labs not drawn today  - hold losartan-HCTZ    - trend Cr       Troponemia   - trop 0.03--> 0.02; stable  - EKG showed no ischemic changes, pt asymptomatic   - likely NSTEMI type 2(demand ischemia and decreased renal clearance)  - last TTE 5/17/23: EF 70%, GIDD   - seen by cardio last admission for trop 0.04 and LBB: recommended stress test as OP but did not do. reiterate again upon discharge  - c/w tele monitoring     New DVT   - no documentation of duplex results in NH chart   - pt was on lovenox 80 mg BID despite low GFR   - f/u bilateral LE duplex -PENDING  - received AM lovenox in NH   - started heparin ggt. stop heparin, can start Eliquis. Pt aware bleed risk of blood thinners  - likley from covid?    Recent covid   - asymptomatic currently   - completed isolation    Generalized weakness   Decreased PO intake   - will resume regular diet ( S&S eval 2 weeks ago )   - c/w megestrol and mirtazepine   - will start calorie count      Hypovolemic Hyponatremia   - Na 134-->140  - f/u after hydration     HTN   - BP borderline low   - hold amlodipine and losartan-HCTZ     Hypothyroidism   - on levothyroxine 25 mcg once weekly on Tuesday   - TSH 2.85 (5/17/23)     #DVT ppx: start Eliquis  #GI ppx: pantoprazole   #Diet: regular   #Dispo: tele     #Progress Note Handoff  Pending (specify):  calorie count  Disposition: HUnknown at this time________   76 yo F PMHx HTN, CKD III ( baseline 1.6-1.7 ), hypothyroidism, kidney stones, arthritis, recent discharge from [facility] on 5/20 for after extensive hospital course where she was treated for decreased appetite, found to have circumferential esophageal thickening, who presented for evaluation of syncope.      Syncope   - likely vasovagal due to dehydration from decreased PO intake  - < from: EEG (06.05.23 @ 12:00) >  Impression: Abnormal due to the presence of :  Focal and generalized slowing as described above  Clinical Correlation:  Focal and diffuse electrocerebral dysfunction secondary to   structural/epileptiform/nonspecific etiology  It is of note that the recording contains significant amount of movement   artifact  - < from: TTE Echo Complete w/o Contrast w/ Doppler (05.17.23 @ 09:18) >  Normal global left ventricular systolic function.  LV Ejection Fraction by Kwan's Method with a biplane EF of 70 %.  Abnormal septal motion consistent with left bundle branch block.  Grade I diastolic dysfunction  mild left ventricular hypertrophy.  Right atrial size not well visualized.  Mild aortic regurgitation.  Mild tricuspid regurgitation.  dilatation of the aortic root.  borderline pulmonary hypertension.  There is a significant pericardial fat pad present. Cannot rule out   small effusion.  - suspect vasovagal from dehydration due to poor PO intake due to hiatal herania    Decreased PO intake  - had extensive work up on prior admission  - EGD showed:  The upperand mid esophagus were normal. The distil esophagus revealed a ring  that was 100% circumferential and 5 % obstructing. Biopsies of the mid and  distil esophagus were taken for EoE and the ring was biopsied in 4 quadrants.  The ring was located at 39cm and was above a moderate sized hiatal hernia.  Erythema in the whole stomach compatible with gastritis. (Biopsy).  Ulcers in the Antrum.  Normal mucosa in the whole examined duodenum. (Biopsy).  - suspect hiatal hernia causing smaller gastric area leading to decreased appetitie  - calorie count    Hypercalcemia  - repeat BMP  - could be due to dehydration    Leukocytosis  - 12k-->9k  - likely reactive    PAMELA on CKD   Likely pre-renal   Non-obstructive stones   - Cr 2.9 ( baseline 1.7 last admission ) -->2.3  - SCr downtrended, labs not drawn today  - hold losartan-HCTZ    - trend Cr       Troponemia   - trop 0.03--> 0.02; stable  - EKG showed no ischemic changes, pt asymptomatic   - likely NSTEMI type 2(demand ischemia and decreased renal clearance)  - last TTE 5/17/23: EF 70%, GIDD   - seen by cardio last admission for trop 0.04 and LBB: recommended stress test as OP but did not do. reiterate again upon discharge  - c/w tele monitoring     New DVT   - no documentation of duplex results in NH chart   - pt was on lovenox 80 mg BID despite low GFR   - f/u bilateral LE duplex -PENDING  - received AM lovenox in NH   - started heparin ggt. stop heparin, can start Eliquis. Pt aware bleed risk of blood thinners  - likley from covid?    Recent covid   - asymptomatic currently   - completed isolation    Generalized weakness   Decreased PO intake   - will resume regular diet ( S&S eval 2 weeks ago )   - c/w megestrol and mirtazepine   - will start calorie count      Hypovolemic Hyponatremia   - Na 134-->140  - f/u after hydration     HTN   - BP borderline low   - hold amlodipine and losartan-HCTZ     Hypothyroidism   - on levothyroxine 25 mcg once weekly on Tuesday   - TSH 2.85 (5/17/23)     #DVT ppx: start Eliquis  #GI ppx: pantoprazole   #Diet: regular   #Dispo: tele     #Progress Note Handoff  Pending (specify):  calorie count  Disposition: HUnknown at this time________

## 2023-06-06 NOTE — PROGRESS NOTE ADULT - ASSESSMENT
77F with a PMH of HTN, CKD 3 ( baseline 1.6-1.7 ), hypothyroidism, kidney stones, arthritis, recent discharge from [facility] on 5/20 for after extensive hospital course, who presents after a syncopal episode.    #Syncope most likely 2/2 ongoing poor PO intake    # r/o vasovagal/ r/o seizures   #hiatal hernia  - CTH -ve, CTA H&N -ve   - 5/2023 EGD showed: Antral mucosa showing reactive gastropathy and mild chronic nonspecific   inflammation. Esophageal ring at 40 cm:   -calorie count    # Leukocytosis: resolved  - trend CBC  - monitor off abx    #PAMELA on CKD most likely pre-renal   #Hx of non-obstructive stones   - Cr 2.9 ( baseline 1.7 last admission ) -->2.3  - told nurse to straight cath and place nieves if > 350 come out   ( pt is currently in hallway )   - gentle hydration LR 75 cc/hr  - hold losartan-HCTZ    - trend Cr   - pt received contrast for CTA H&N this admission    #Troponemia   - trop 0.03--> 0.02; stable  - EKG showed no ischemic changes, pt asymptomatic   - likely NSTEMI type 2(demand ischemia and decreased renal clearance)  - last TTE 5/17/23: EF 70%, GIDD   - seen by cardio last admission for trop 0.04 and LBB: recommended stress test as OP but did not do. reiterate again upon discharge  - c/w tele monitoring     #New DVT   - no documentation of duplex results in NH chart   - pt was on lovenox 80 mg BID despite low GFR   - f/u bilateral LE duplex -PENDING  - received AM lovenox in NH   - started heparin ggt  until dupplex is back.    #Hx of recent covid   - asymptomatic currently     #Generalized weakness   #Decreased PO intake   - will resume regular diet ( S&S eval 2 weeks ago )   - c/w megestrol and mirtazepine   - will start calorie count      #Hypovolemic Hyponatremia , RESOLVED  - Na 134-->140  - f/u after hydration     #Hx of HTN   - BP borderline low   - hold amlodipine and losartan-HCTZ     #Hypothyroidism   - on levothyroxine 25 mcg once weekly on Tuesday   - TSH 2.85 (5/17/23)     DVT ppx: heparin ggt  GI ppx: protonix  Diet: regular   Dispo: acute       77F with a PMH of HTN, CKD 3 ( baseline 1.6-1.7 ), hypothyroidism, kidney stones, arthritis, recent discharge from [facility] on 5/20 for after extensive hospital course, who presents after a syncopal episode.    #Syncope most likely 2/2 ongoing poor PO intake    # r/o vasovagal/ r/o seizures   #hiatal hernia  - CTH -ve, CTA H&N -ve   - 5/2023 EGD showed: Antral mucosa showing reactive gastropathy and mild chronic nonspecific   inflammation. Esophageal ring at 40 cm:   -calorie count    # Leukocytosis: resolved  - trend CBC  - monitor off abx    #PAMELA on CKD most likely pre-renal   #Hx of non-obstructive stones   - Cr 2.9 ( baseline 1.7 )  -received contrast for CTA H&N this admission  - gentle hydration LR 75 cc/hr  - hold losartan-HCTZ    - trend Cr     #Troponemia   - trop 0.03--> 0.02; stable  - EKG showed no ischemic changes, pt asymptomatic   - likely NSTEMI type 2(demand ischemia and decreased renal clearance)  - last TTE 5/17/23: EF 70%, GIDD   - seen by cardio last admission for trop 0.04 and LBB  -  cardio recommended stress test as OP but did not do. reiterate again upon discharge  - c/w tele monitoring     #New DVT   - no documentation of duplex results in NH chart   - pt was on lovenox 80 mg BID despite low GFR   - f/u bilateral LE duplex -PENDING  - received AM lovenox in NH   - started heparin ggt  until dupplex is back.    #Hx of recent covid   - asymptomatic currently     #Generalized weakness   #Decreased PO intake   - will resume regular diet per S&S  - c/w megestrol and mirtazepine   - will start calorie count      #Hypovolemic Hyponatremia , RESOLVED  - Na 134-->140  - f/u after hydration     #Hx of HTN   - BP borderline low   - hold amlodipine and losartan-HCTZ     #Hypothyroidism   - on levothyroxine 25 mcg once weekly on Tuesday   - TSH 2.85 (5/17/23)     DVT ppx: heparin ggt  GI ppx: protonix  Diet: regular   Dispo: acute       77F with a PMH of HTN, CKD 3 ( baseline 1.6-1.7 ), hypothyroidism, kidney stones, arthritis, recent discharge from [facility] on 5/20 for after extensive hospital course, who presents after a syncopal episode.    #Syncope most likely 2/2 ongoing poor PO intake    # r/o vasovagal/ r/o seizures   #hiatal hernia  - CTH -ve, CTA H&N -ve   - 5/2023 EGD showed: Antral mucosa showing reactive gastropathy and mild chronic nonspecific   inflammation. Esophageal ring at 40 cm:   -calorie count    #Macrocytic anemia  - folate and vitamin b12 pending    # Leukocytosis: resolved  - trend CBC  - monitor off abx    #PAMELA on CKD most likely pre-renal   #Hx of non-obstructive stones   - Cr 2.9 ( baseline 1.7 )  -received contrast for CTA H&N this admission  - gentle hydration LR 75 cc/hr  - hold losartan-HCTZ    - trend Cr     #Troponemia   - trop 0.03--> 0.02; stable  - EKG showed no ischemic changes, pt asymptomatic   - likely NSTEMI type 2(demand ischemia and decreased renal clearance)  - last TTE 5/17/23: EF 70%, GIDD   - seen by cardio last admission for trop 0.04 and LBB  -  cardio recommended stress test as OP but did not do. reiterate again upon discharge  - c/w tele monitoring     #New DVT   - no documentation of duplex results in NH chart   - pt was on lovenox 80 mg BID despite low GFR   - f/u bilateral LE duplex -PENDING  - c/w heparin gtt until duplex back    #Hx of recent covid   - asymptomatic currently   - diagnosed 5/23 and confirmed with Eger  - no need for isolation    #Generalized weakness   #Decreased PO intake   - will resume regular diet per S&S  - c/w megestrol and mirtazepine   - will start calorie count      #Hypovolemic Hyponatremia , RESOLVED  - Na 134-->140  - monitor    #Hx of HTN   - hold amlodipine and losartan-HCTZ due to soft BP    #Hypothyroidism   - on levothyroxine 25 mcg once weekly on Tuesday   - TSH 2.85 (5/17/23)     DVT ppx: heparin ggt  GI ppx: protonix  Diet: regular   Dispo: acute       77F with a PMH of HTN, CKD 3 ( baseline 1.6-1.7 ), hypothyroidism, kidney stones, arthritis, recent discharge from [facility] on 5/20 for after extensive hospital course, who presents after a syncopal episode.    #Syncope most likely 2/2 ongoing poor PO intake    # r/o vasovagal/ r/o seizures   #hiatal hernia  - CTH -ve, CTA H&N -ve   - 5/2023 EGD showed: Antral mucosa showing reactive gastropathy and mild chronic nonspecific   inflammation. Esophageal ring at 40 cm:   -calorie count    #Macrocytic anemia  - folate and vitamin b12 pending    # Leukocytosis: resolved  - trend CBC  - monitor off abx    #PAMELA on CKD most likely pre-renal   #Hx of non-obstructive stones   - Cr 2.9 ( baseline 1.7 )  -received contrast for CTA H&N this admission  - gentle hydration LR 75 cc/hr  - hold losartan-HCTZ    - trend Cr     #Troponemia   - trop 0.03--> 0.02; stable  - EKG showed no ischemic changes, pt asymptomatic   - likely NSTEMI type 2(demand ischemia and decreased renal clearance)  - last TTE 5/17/23: EF 70%, GIDD   - seen by cardio last admission for trop 0.04 and LBB  -  cardio recommended stress test as OP but did not do. reiterate again upon discharge  - c/w tele monitoring     #DVT of Left LE  - no documentation of duplex results in NH chart   - pt was on lovenox 80 mg BID despite low GFR   - repeat duplex 6/2023 positive for multiple DVT of L LE  - c/w heparin gtt     #Hx of recent covid   - asymptomatic currently   - diagnosed 5/23 and confirmed with Eger  - no need for isolation    #Generalized weakness   #Decreased PO intake   - will resume regular diet per S&S  - c/w megestrol and mirtazepine   - will start calorie count      #Hypovolemic Hyponatremia , RESOLVED  - Na 134-->140  - monitor    #Hx of HTN   - hold amlodipine and losartan-HCTZ due to soft BP    #Hypothyroidism   - on levothyroxine 25 mcg once weekly on Tuesday   - TSH 2.85 (5/17/23)     DVT ppx: heparin ggt  GI ppx: protonix  Diet: regular   Dispo: acute

## 2023-06-06 NOTE — PROGRESS NOTE ADULT - SUBJECTIVE AND OBJECTIVE BOX
LENGTH OF HOSPITAL STAY: 2d    Overnight events: none  Complaints: none    CHIEF COMPLAINT:   Patient is a 77y old  Female who presents with a chief complaint of syncope episode (2023 15:02)        HISTORY OF PRESENTING ILLNESS:    HPI:  Patient is a 77F with a PMH of HTN, CKD 3 ( baseline 1.6-1.7 ), hypothyroidism, kidney stones, arthritis, recent discharge from [facility] on  for after being admitted for generalized weakness and poor PO intake (course was complicated by PAMELA, EGD which showed esophageal ring obstruction, gastritis, and antral ulcers, troponemia, and LBBB), hx of recent covid infection () and left leg DVT diagnosed at [facility] 2 weeks ago, who presents today after a syncopal episode. Per patient's son (present at bedside), patient was discharged to [facility] after recent hospitalization where she has continued to decline from a functional status. Yesterday, while visiting the patient at [facility], son witnessed her suddenly slump forward in her wheelchair and become unresponsive with eyes opened. Son walked into the hallway to get assistance. Upon re-entering the room, patient regained consciousness and returned to baseline. Per son, immediately prior to the episode, patient said "I don't feel good". Today, son was told that a similar episode occurred at [facility], but this time associated with slurred speech. Patient does not recall the episodes, but denies any urinary incontinence or tongue bite. Son states that patient has continued to have poor PO intake and generalized weakness with barely eating or drinking water ( today she only drank 1/2 cup of water in am ). No other associated sxs. Pt had covid infection on  she had cough for few days and then it resolved. During the same week she had left leg swelling and duplex done at NH showed multiple clots as per son. She denied any chest pain, SOB, cough, palpitations, urinary symptoms headache, focal weakness. Son also reported that pt was having low BP for the past few days reaching 90s at NH.     In Ed vitals: /56, HR 67, T98.1, spo2 98% on RA   Labs: WBC 12.4K, Na 134, Cr 2.9, BUN 43, trop 0.03  CTH negative   CTA HEAD:  No evidence of flow-limiting stenosis, occlusion or aneurysm.  CTA NECK:  Mild stenosis of the right proximal ICA.  Mild stenosis of the V4 segment of the left vertebral artery.        (2023 23:35)    PAST MEDICAL & SURGICAL HISTORY  PAST MEDICAL & SURGICAL HISTORY:  HTN (hypertension)      Hypothyroidism      Stage 3 chronic kidney disease      Arthritis      Kidney stone        SOCIAL HISTORY:    ALLERGIES:  No Known Allergies    MEDICATIONS:  STANDING MEDICATIONS  heparin  Infusion. 1200 Unit(s)/Hr IV Continuous <Continuous>  lactated ringers. 1000 milliLiter(s) IV Continuous <Continuous>  levothyroxine 25 MICROGram(s) Oral <User Schedule>  magnesium oxide 400 milliGRAM(s) Oral daily  megestrol 20 milliGRAM(s) Oral daily  metoprolol succinate ER 50 milliGRAM(s) Oral daily  mirtazapine 7.5 milliGRAM(s) Oral at bedtime  pantoprazole    Tablet 40 milliGRAM(s) Oral before breakfast    PRN MEDICATIONS  heparin   Injectable 4500 Unit(s) IV Push every 6 hours PRN  heparin   Injectable 2000 Unit(s) IV Push every 6 hours PRN    VITALS:   T(F): 97  HR: 50  BP: 119/60  RR: 18  SpO2: 98%    LABS:                        12.0   8.13  )-----------( 263      ( 2023 06:24 )             36.8     06-05    140  |  104  |  39<H>  ----------------------------<  87  4.7   |  20  |  2.3<H>    Ca    10.7<H>      2023 06:17  Phos  3.1     06-05  Mg     2.7     06-05    TPro  5.9<L>  /  Alb  3.3<L>  /  TBili  0.4  /  DBili  x   /  AST  11  /  ALT  6   /  AlkPhos  52  06-05    PT/INR - ( 2023 14:33 )   PT: 13.60 sec;   INR: 1.19 ratio         PTT - ( 2023 06:18 )  PTT:99.4 sec  Urinalysis Basic - ( 2023 01:45 )    Color: Yellow / Appearance: Clear / S.015 / pH: x  Gluc: x / Ketone: Negative  / Bili: Negative / Urobili: <2 mg/dL   Blood: x / Protein: Trace / Nitrite: Negative   Leuk Esterase: Negative / RBC: x / WBC x   Sq Epi: x / Non Sq Epi: x / Bacteria: x            CARDIAC MARKERS ( 2023 06:17 )  x     / 0.02 ng/mL / x     / x     / x      CARDIAC MARKERS ( 2023 23:45 )  x     / 0.02 ng/mL / x     / x     / x      CARDIAC MARKERS ( 2023 14:33 )  x     / 0.03 ng/mL / x     / x     / x            PHYSICAL EXAM:  GEN: No acute distress  LUNGS: Normal respiratory effort.  on RA  HEART: S1/S2 present. RRR. no murmurs  ABD: Soft, non-tender, non-distended  EXT: no cyanosis or edema  NEURO: AAOX3, no focal deficits

## 2023-06-07 VITALS
TEMPERATURE: 98 F | HEART RATE: 57 BPM | DIASTOLIC BLOOD PRESSURE: 59 MMHG | RESPIRATION RATE: 18 BRPM | SYSTOLIC BLOOD PRESSURE: 119 MMHG

## 2023-06-07 LAB
ANION GAP SERPL CALC-SCNC: 12 MMOL/L — SIGNIFICANT CHANGE UP (ref 7–14)
BASOPHILS # BLD AUTO: 0.06 K/UL — SIGNIFICANT CHANGE UP (ref 0–0.2)
BASOPHILS NFR BLD AUTO: 0.6 % — SIGNIFICANT CHANGE UP (ref 0–1)
BUN SERPL-MCNC: 24 MG/DL — HIGH (ref 10–20)
CALCIUM SERPL-MCNC: 10.2 MG/DL — SIGNIFICANT CHANGE UP (ref 8.4–10.5)
CHLORIDE SERPL-SCNC: 106 MMOL/L — SIGNIFICANT CHANGE UP (ref 98–110)
CO2 SERPL-SCNC: 21 MMOL/L — SIGNIFICANT CHANGE UP (ref 17–32)
CREAT SERPL-MCNC: 1.8 MG/DL — HIGH (ref 0.7–1.5)
EGFR: 28 ML/MIN/1.73M2 — LOW
EOSINOPHIL # BLD AUTO: 0.2 K/UL — SIGNIFICANT CHANGE UP (ref 0–0.7)
EOSINOPHIL NFR BLD AUTO: 1.9 % — SIGNIFICANT CHANGE UP (ref 0–8)
GLUCOSE BLDC GLUCOMTR-MCNC: 119 MG/DL — HIGH (ref 70–99)
GLUCOSE SERPL-MCNC: 94 MG/DL — SIGNIFICANT CHANGE UP (ref 70–99)
HCT VFR BLD CALC: 38.4 % — SIGNIFICANT CHANGE UP (ref 37–47)
HGB BLD-MCNC: 12.2 G/DL — SIGNIFICANT CHANGE UP (ref 12–16)
IMM GRANULOCYTES NFR BLD AUTO: 0.6 % — HIGH (ref 0.1–0.3)
LYMPHOCYTES # BLD AUTO: 2.49 K/UL — SIGNIFICANT CHANGE UP (ref 1.2–3.4)
LYMPHOCYTES # BLD AUTO: 24.2 % — SIGNIFICANT CHANGE UP (ref 20.5–51.1)
MAGNESIUM SERPL-MCNC: 2 MG/DL — SIGNIFICANT CHANGE UP (ref 1.8–2.4)
MCHC RBC-ENTMCNC: 31.8 G/DL — LOW (ref 32–37)
MCHC RBC-ENTMCNC: 32.7 PG — HIGH (ref 27–31)
MCV RBC AUTO: 102.9 FL — HIGH (ref 81–99)
MONOCYTES # BLD AUTO: 0.69 K/UL — HIGH (ref 0.1–0.6)
MONOCYTES NFR BLD AUTO: 6.7 % — SIGNIFICANT CHANGE UP (ref 1.7–9.3)
NEUTROPHILS # BLD AUTO: 6.79 K/UL — HIGH (ref 1.4–6.5)
NEUTROPHILS NFR BLD AUTO: 66 % — SIGNIFICANT CHANGE UP (ref 42.2–75.2)
NRBC # BLD: 0 /100 WBCS — SIGNIFICANT CHANGE UP (ref 0–0)
PLATELET # BLD AUTO: 241 K/UL — SIGNIFICANT CHANGE UP (ref 130–400)
PMV BLD: 11.2 FL — HIGH (ref 7.4–10.4)
POTASSIUM SERPL-MCNC: 4.3 MMOL/L — SIGNIFICANT CHANGE UP (ref 3.5–5)
POTASSIUM SERPL-SCNC: 4.3 MMOL/L — SIGNIFICANT CHANGE UP (ref 3.5–5)
RBC # BLD: 3.73 M/UL — LOW (ref 4.2–5.4)
RBC # FLD: 12.5 % — SIGNIFICANT CHANGE UP (ref 11.5–14.5)
SODIUM SERPL-SCNC: 139 MMOL/L — SIGNIFICANT CHANGE UP (ref 135–146)
WBC # BLD: 10.29 K/UL — SIGNIFICANT CHANGE UP (ref 4.8–10.8)
WBC # FLD AUTO: 10.29 K/UL — SIGNIFICANT CHANGE UP (ref 4.8–10.8)

## 2023-06-07 PROCEDURE — 99239 HOSP IP/OBS DSCHRG MGMT >30: CPT

## 2023-06-07 RX ORDER — AMLODIPINE BESYLATE 2.5 MG/1
1 TABLET ORAL
Refills: 0 | DISCHARGE

## 2023-06-07 RX ORDER — ENOXAPARIN SODIUM 100 MG/ML
80 INJECTION SUBCUTANEOUS
Refills: 0 | DISCHARGE

## 2023-06-07 RX ORDER — LOSARTAN/HYDROCHLOROTHIAZIDE 100MG-25MG
1 TABLET ORAL
Refills: 0 | DISCHARGE

## 2023-06-07 RX ORDER — CHLORHEXIDINE GLUCONATE 213 G/1000ML
1 SOLUTION TOPICAL
Refills: 0 | Status: DISCONTINUED | OUTPATIENT
Start: 2023-06-07 | End: 2023-06-07

## 2023-06-07 RX ADMIN — Medication 50 MILLIGRAM(S): at 05:14

## 2023-06-07 RX ADMIN — APIXABAN 10 MILLIGRAM(S): 2.5 TABLET, FILM COATED ORAL at 17:02

## 2023-06-07 RX ADMIN — APIXABAN 10 MILLIGRAM(S): 2.5 TABLET, FILM COATED ORAL at 05:14

## 2023-06-07 RX ADMIN — PANTOPRAZOLE SODIUM 40 MILLIGRAM(S): 20 TABLET, DELAYED RELEASE ORAL at 05:15

## 2023-06-07 RX ADMIN — CHLORHEXIDINE GLUCONATE 1 APPLICATION(S): 213 SOLUTION TOPICAL at 05:14

## 2023-06-07 RX ADMIN — MAGNESIUM OXIDE 400 MG ORAL TABLET 400 MILLIGRAM(S): 241.3 TABLET ORAL at 12:26

## 2023-06-07 RX ADMIN — MEGESTROL ACETATE 20 MILLIGRAM(S): 40 SUSPENSION ORAL at 12:27

## 2023-06-07 NOTE — DISCHARGE NOTE PROVIDER - NSDCMRMEDTOKEN_GEN_ALL_CORE_FT
apixaban 5 mg oral tablet: 2 tab(s) orally 2 times a day start 2 tabs (10 mg total) 2x day for 7 days.   On 6/14 start 1 tab (5mg)  2x day  levothyroxine 25 mcg (0.025 mg) oral tablet: 1 tab(s) orally once a week Please take on Tuesday once per week on an empty stomach  magnesium oxide 400 mg oral tablet: 2 tab(s) orally once a day  megestrol 20 mg oral tablet: 1 tab(s) orally once a day  metoprolol succinate 50 mg oral capsule, extended release: 1 orally once a day  mirtazapine 7.5 mg oral tablet: 1 tab(s) orally once a day (at bedtime)  pantoprazole 40 mg oral delayed release tablet: 1 tab(s) orally once a day

## 2023-06-07 NOTE — PROGRESS NOTE ADULT - SUBJECTIVE AND OBJECTIVE BOX
ELIAZAR WINTERS  77y  Female      Patient is a 77y old  Female who presents with a chief complaint of syncope episode (06 Jun 2023 16:14)      INTERVAL HPI/OVERNIGHT EVENTS:  She feels better, no new complaints, she is eating better today.   Vital Signs Last 24 Hrs  T(C): 36.4 (07 Jun 2023 13:10), Max: 36.6 (06 Jun 2023 22:05)  T(F): 97.5 (07 Jun 2023 13:10), Max: 97.9 (06 Jun 2023 22:05)  HR: 57 (07 Jun 2023 13:10) (57 - 70)  BP: 119/59 (07 Jun 2023 13:10) (101/57 - 119/59)  BP(mean): --  RR: 18 (07 Jun 2023 13:10) (18 - 18)  SpO2: --          06-06-23 @ 07:01  -  06-07-23 @ 07:00  --------------------------------------------------------  IN: 1013 mL / OUT: 0 mL / NET: 1013 mL            Consultant(s) Notes Reviewed:  [x ] YES  [ ] NO          MEDICATIONS  (STANDING):  apixaban 10 milliGRAM(s) Oral two times a day  chlorhexidine 2% Cloths 1 Application(s) Topical <User Schedule>  levothyroxine 25 MICROGram(s) Oral <User Schedule>  magnesium oxide 400 milliGRAM(s) Oral daily  megestrol 20 milliGRAM(s) Oral daily  metoprolol succinate ER 50 milliGRAM(s) Oral daily  mirtazapine 7.5 milliGRAM(s) Oral at bedtime  pantoprazole    Tablet 40 milliGRAM(s) Oral before breakfast    MEDICATIONS  (PRN):      LABS                          12.2   10.29 )-----------( 241      ( 07 Jun 2023 08:46 )             38.4     06-07    139  |  106  |  24<H>  ----------------------------<  94  4.3   |  21  |  1.8<H>    Ca    10.2      07 Jun 2023 08:46  Mg     2.0     06-07          PTT - ( 06 Jun 2023 14:20 )  PTT:97.0 sec  Lactate Trend  06-04 @ 23:45 Lactate:1.1         CAPILLARY BLOOD GLUCOSE            RADIOLOGY & ADDITIONAL TESTS:    Imaging Personally Reviewed:  [ ] YES  [ ] NO    HEALTH ISSUES - PROBLEM Dx:          PHYSICAL EXAM:  GENERAL: NAD, well-developed.  HEAD:  Atraumatic, Normocephalic.  EYES: EOMI, PERRLA, conjunctiva and sclera clear.  NECK: Supple, No JVD.  CHEST/LUNG: Clear to auscultation bilaterally; No wheeze.  HEART: Regular rate and rhythm; S1 S2.   ABDOMEN: Soft, Nontender, Nondistended; Bowel sounds present.  EXTREMITIES:  2+ Peripheral Pulses, No clubbing, cyanosis, or edema.  PSYCH: AAOx3.  NEUROLOGY: non-focal.  SKIN: No rashes or lesions.

## 2023-06-07 NOTE — PROGRESS NOTE ADULT - ASSESSMENT
76 yo F PMHx HTN, CKD III ( baseline 1.6-1.7 ), hypothyroidism, kidney stones, arthritis, recent discharge from [facility] on 5/20 for after extensive hospital course where she was treated for decreased appetite, found to have circumferential esophageal thickening, who presented for evaluation of syncope.      Syncope:   Episode of brief loss of consciousness while setting in the bed.   likely from hypotension and dehydration.   EEG: showed focal and generalized slowing.   Recent Echo 5/17 showed normal LVEF 70%, G1DD< mild TR., borderline pulmonary hypertension.  Telemetry showed no arrhythmia.   Serial Troponin 0.02, mild, clinically insignificant, likely from CKD and AK<    Decreased Oral intake:   Patient reports feeling full with a small amount of food  She had extensive work up on prior admission  EGD showed: The distil esophagus revealed a ring that was 100% circumferential and 5 % obstructing, moderate sized hiatal hernia. Nonerosive gastritis. .Ulcers in the Antrum.  suspect hiatal hernia causing smaller gastric area leading to early satiety.   No problem with swallowing.   Continue megestrol and mirtazepine   Her Son is concern about depressed mood, refer to psych outpatient.   Encourage oral feeding.     PAMELA on CKD stage 3-4:   Cr improved from 2.9 to 1.8, s/p IV fluid.   Likely pre-renal from poor oral intake and diuretics   Hold losartan-HCTZ    Avoid nephrotoxic agents.     Hypercalcemia  improved, likely from HCTZ and dehydration.     Recent DVT at NH:   Repeated venous duplex 6/6/23 showed Acute DVT of the left external iliac, common femoral, femoral and popliteal veins. Superficial thrombophlebitis of the   left small saphenous vein.  Possibly from recent COVID and decrease mobilization.   Started on Eliquis 10mg BID for 7 days then 5mg BID.     Recent covid   Asymptomatic currently   Completed isolation      HTN   BP borderline low   Hold amlodipine and losartan-HCTZ     Hypothyroidism   on levothyroxine 25 mcg once weekly on Tuesday   TSH 2.85 (5/17/23)     #DVT ppx: start Eliquis  GI ppx: pantoprazole     #Progress Note Handoff  Pending (specify):    Family discussion: with her son, update about Acute Kidney Injury, poor oral intake. Encourage to oral feeding.   Disposition:  STR today.

## 2023-06-07 NOTE — DISCHARGE NOTE PROVIDER - CARE PROVIDER_API CALL
TRAVIS WALKER [address] [address]  Phone: [phone]  Fax: [phone]  Follow Up Time: 2 weeks

## 2023-06-07 NOTE — DISCHARGE NOTE NURSING/CASE MANAGEMENT/SOCIAL WORK - PATIENT PORTAL LINK FT
You can access the FollowMyHealth Patient Portal offered by [facility] by registering at the following website: [email or web address]. By joining [employer]’s FollowMyHealth portal, you will also be able to view your health information using other applications (apps) compatible with our system.

## 2023-06-07 NOTE — DISCHARGE NOTE PROVIDER - HOSPITAL COURSE
77F with a PMH of HTN, CKD 3 ( baseline 1.6-1.7 ), hypothyroidism, kidney stones, arthritis, recent discharge from [facility] on 5/20 for after extensive hospital course, who presents after a syncopal episode.    #Syncope most likely 2/2 ongoing poor PO intake    # r/o vasovagal/ r/o seizures   #hiatal hernia  - CTH -ve, CTA H&N -ve   - 5/2023 EGD showed: Antral mucosa showing reactive gastropathy and mild chronic nonspecific   inflammation. Esophageal ring at 40 cm:   -pt eating well, no plan for NGT or PEG at this time    #Macrocytic anemia  - folate and vitamin b12 pending, please follow-up OP    # Leukocytosis: resolved  - monitored off abx    #PAMELA on CKD most likely pre-renal   #Hx of non-obstructive stones   - Cr 2.9 ( baseline 1.7 )  -received contrast for CTA H&N this admission  - gentle hydration LR 75 cc/hr  - hold losartan-HCTZ    - Cr now at baseline    #Troponemia   - trop 0.03--> 0.02; stable  - EKG showed no ischemic changes, pt asymptomatic   - likely NSTEMI type 2(demand ischemia and decreased renal clearance)  - last TTE 5/17/23: EF 70%, GIDD   - seen by cardio last admission for trop 0.04 and LBB  -  cardio recommended stress test as OP but did not go. pt will need stress test as OP after discharge      #DVT of Left LE  - no documentation of duplex results in NH chart   - pt was on lovenox 80 mg BID despite low GFR   - repeat duplex 6/2023 positive for multiple DVT of L LE  - switched to eliquis 10mg BID for 7 days and then 5 mg daily    #Hx of recent covid   - asymptomatic currently   - diagnosed 5/23 and confirmed with Eger  - no need for isolation    #Generalized weakness   #Decreased PO intake   - will resume regular diet per S&S  - c/w megestrol and mirtazepine     #Hypovolemic Hyponatremia , RESOLVED  - Na 134-->140  - monitor    #Hx of HTN   - hold amlodipine and losartan-HCTZ due to soft BP    #Hypothyroidism   - on levothyroxine 25 mcg once weekly on Tuesday   - TSH 2.85 (5/17/23)    77F with a PMH of HTN, CKD 3 ( baseline 1.6-1.7 ), hypothyroidism, kidney stones, arthritis, recent discharge from [facility] on 5/20 for after extensive hospital course, who presents after a syncopal episode.    #Syncope most likely 2/2 ongoing poor PO intake    # r/o vasovagal/ r/o seizures   #hiatal hernia  - CTH -ve, CTA H&N -ve   - 5/2023 EGD showed: Antral mucosa showing reactive gastropathy and mild chronic nonspecific   inflammation. Esophageal ring at 40 cm:   -pt eating well, no plan for NGT or PEG at this time    #Macrocytic anemia  - folate and vitamin b12 pending, please follow-up OP    # Leukocytosis: resolved  - monitored off abx    #PAMELA on CKD most likely pre-renal   #Hx of non-obstructive stones   - Cr 2.9 ( baseline 1.7 )  -received contrast for CTA H&N this admission  - gentle hydration LR 75 cc/hr  - hold losartan-HCTZ    - Cr now at baseline    #Troponemia   - trop 0.03--> 0.02; stable  - EKG showed no ischemic changes, pt asymptomatic   - likely NSTEMI type 2(demand ischemia and decreased renal clearance)  - last TTE 5/17/23: EF 70%, GIDD   - seen by cardio last admission for trop 0.04 and LBB  -  cardio recommended stress test as OP but did not go. pt will need stress test as OP after discharge    #?MDD  - pt currently on remeron  - recommend OP psychiatry      #DVT of Left LE  - no documentation of duplex results in NH chart   - pt was on lovenox 80 mg BID despite low GFR   - repeat duplex 6/2023 positive for multiple DVT of L LE  - switched to eliquis 10mg BID for 7 days and then 5 mg daily    #Hx of recent covid   - asymptomatic currently   - diagnosed 5/23 and confirmed with Eger  - no need for isolation    #Generalized weakness   #Decreased PO intake   - will resume regular diet per S&S  - c/w megestrol and mirtazepine     #Hypovolemic Hyponatremia , RESOLVED  - Na 134-->140  - monitor    #Hx of HTN   - hold amlodipine and losartan-HCTZ due to soft BP    #Hypothyroidism   - on levothyroxine 25 mcg once weekly on Tuesday   - TSH 2.85 (5/17/23)    77F with a PMH of HTN, CKD 3 ( baseline 1.6-1.7 ), hypothyroidism, kidney stones, arthritis, recent discharge from [facility] on 5/20 for after extensive hospital course, who presents after a syncopal episode.    #Syncope most likely 2/2 ongoing poor PO intake    # r/o vasovagal/ r/o seizures   #hiatal hernia  - CTH -ve, CTA H&N -ve   - 5/2023 EGD showed: Antral mucosa showing reactive gastropathy and mild chronic nonspecific   inflammation. Esophageal ring at 40 cm:   -pt eating well, no plan for NGT or PEG at this time  - may benefit from psychiatry referral for depression    #Macrocytic anemia  - folate and vitamin b12 pending, please follow-up OP    # Leukocytosis: resolved  - monitored off abx    #PAMELA on CKD most likely pre-renal   #Hx of non-obstructive stones   - Cr 2.9 ( baseline 1.7 )  -received contrast for CTA H&N this admission  - gentle hydration LR 75 cc/hr  - hold losartan-HCTZ    - Cr now at baseline    #Troponemia   - trop 0.03--> 0.02; stable  - EKG showed no ischemic changes, pt asymptomatic   - likely NSTEMI type 2(demand ischemia and decreased renal clearance)  - last TTE 5/17/23: EF 70%, GIDD   - seen by cardio last admission for trop 0.04 and LBB  -  cardio recommended stress test as OP but did not go. pt will need stress test as OP after discharge    #?MDD  - pt currently on remeron  - recommend OP psychiatry      #DVT of Left LE  - no documentation of duplex results in NH chart   - pt was on lovenox 80 mg BID despite low GFR   - repeat duplex 6/2023 positive for multiple DVT of L LE  - switched to eliquis 10mg BID for 7 days and then 5 mg daily    #Hx of recent covid   - asymptomatic currently   - diagnosed 5/23 and confirmed with Eger  - no need for isolation    #Generalized weakness   #Decreased PO intake   - will resume regular diet per S&S  - c/w megestrol and mirtazepine     #Hypovolemic Hyponatremia , RESOLVED  - Na 134-->140  - monitor    #Hx of HTN   - hold amlodipine and losartan-HCTZ due to soft BP    #Hypothyroidism   - on levothyroxine 25 mcg once weekly on Tuesday   - TSH 2.85 (5/17/23)

## 2023-06-07 NOTE — DISCHARGE NOTE PROVIDER - NSDCCPCAREPLAN_GEN_ALL_CORE_FT
PRINCIPAL DISCHARGE DIAGNOSIS  Diagnosis: Metabolic encephalopathy  Assessment and Plan of Treatment: You were found to have a brief lost of consciousness due to poor  oral intake. We monitored your electrolytes as inpatient. Please follow-up with your PMD for further monitoring.   Labs also indicated injury to your kidneys most likely due to poor oral intake. Your renal function improved with hydration and you are now back at your baseline.   Last visit you were seen by the cardiology team who recommended a stress test outside the hospital. Please make sure that you visit with the cardiology team after discharge.   You were found to have an blood clot in your legs. You were started on a medication called eliquis to treat this. Please take this medication as prescribed and follow-up with your PMD on continued monitoring.        SECONDARY DISCHARGE DIAGNOSES  Diagnosis: PAMELA (acute kidney injury)  Assessment and Plan of Treatment:      PRINCIPAL DISCHARGE DIAGNOSIS  Diagnosis: Metabolic encephalopathy  Assessment and Plan of Treatment: You were found to have a brief lost of consciousness due to poor  oral intake. We monitored your electrolytes as inpatient. Please follow-up with your PMD for further monitoring. You may also have undiagnosed depression which can result in poor appetite. We recommend that you follow-up OP with psychiatry.   Labs also indicated injury to your kidneys most likely due to poor oral intake. Your renal function improved with hydration and you are now back at your baseline.   Last visit you were seen by the cardiology team who recommended a stress test outside the hospital. Please make sure that you visit with the cardiology team after discharge.   You were found to have an blood clot in your legs. You were started on a medication called eliquis to treat this. Please take this medication as prescribed and follow-up with your PMD on continued monitoring.        SECONDARY DISCHARGE DIAGNOSES  Diagnosis: PAMELA (acute kidney injury)  Assessment and Plan of Treatment:

## 2023-06-08 LAB
FOLATE SERPL-MCNC: 4.2 NG/ML — LOW
VIT B12 SERPL-MCNC: 332 PG/ML — SIGNIFICANT CHANGE UP (ref 232–1245)

## 2023-06-14 DIAGNOSIS — I12.9 HYPERTENSIVE CHRONIC KIDNEY DISEASE WITH STAGE 1 THROUGH STAGE 4 CHRONIC KIDNEY DISEASE, OR UNSPECIFIED CHRONIC KIDNEY DISEASE: ICD-10-CM

## 2023-06-14 DIAGNOSIS — G93.41 METABOLIC ENCEPHALOPATHY: ICD-10-CM

## 2023-06-14 DIAGNOSIS — N17.9 ACUTE KIDNEY FAILURE, UNSPECIFIED: ICD-10-CM

## 2023-06-14 DIAGNOSIS — Z86.16 PERSONAL HISTORY OF COVID-19: ICD-10-CM

## 2023-06-14 DIAGNOSIS — K44.9 DIAPHRAGMATIC HERNIA WITHOUT OBSTRUCTION OR GANGRENE: ICD-10-CM

## 2023-06-14 DIAGNOSIS — Z87.442 PERSONAL HISTORY OF URINARY CALCULI: ICD-10-CM

## 2023-06-14 DIAGNOSIS — K29.70 GASTRITIS, UNSPECIFIED, WITHOUT BLEEDING: ICD-10-CM

## 2023-06-14 DIAGNOSIS — R53.1 WEAKNESS: ICD-10-CM

## 2023-06-14 DIAGNOSIS — F32.9 MAJOR DEPRESSIVE DISORDER, SINGLE EPISODE, UNSPECIFIED: ICD-10-CM

## 2023-06-14 DIAGNOSIS — I24.8 OTHER FORMS OF ACUTE ISCHEMIC HEART DISEASE: ICD-10-CM

## 2023-06-14 DIAGNOSIS — D53.9 NUTRITIONAL ANEMIA, UNSPECIFIED: ICD-10-CM

## 2023-06-14 DIAGNOSIS — E87.1 HYPO-OSMOLALITY AND HYPONATREMIA: ICD-10-CM

## 2023-06-14 DIAGNOSIS — N18.30 CHRONIC KIDNEY DISEASE, STAGE 3 UNSPECIFIED: ICD-10-CM

## 2023-06-14 DIAGNOSIS — K31.9 DISEASE OF STOMACH AND DUODENUM, UNSPECIFIED: ICD-10-CM

## 2023-06-14 DIAGNOSIS — E83.52 HYPERCALCEMIA: ICD-10-CM

## 2023-06-14 DIAGNOSIS — E86.0 DEHYDRATION: ICD-10-CM

## 2023-06-14 DIAGNOSIS — E03.9 HYPOTHYROIDISM, UNSPECIFIED: ICD-10-CM

## 2023-06-14 DIAGNOSIS — Z79.890 HORMONE REPLACEMENT THERAPY: ICD-10-CM

## 2023-06-14 DIAGNOSIS — I44.7 LEFT BUNDLE-BRANCH BLOCK, UNSPECIFIED: ICD-10-CM

## 2024-02-17 ENCOUNTER — EMERGENCY (EMERGENCY)
Facility: HOSPITAL | Age: 79
LOS: 0 days | Discharge: HOME | End: 2024-02-17
Attending: EMERGENCY MEDICINE
Payer: MEDICARE

## 2024-02-17 VITALS
SYSTOLIC BLOOD PRESSURE: 156 MMHG | RESPIRATION RATE: 18 BRPM | DIASTOLIC BLOOD PRESSURE: 83 MMHG | OXYGEN SATURATION: 97 % | HEART RATE: 88 BPM

## 2024-02-17 VITALS
RESPIRATION RATE: 20 BRPM | WEIGHT: 149.91 LBS | SYSTOLIC BLOOD PRESSURE: 165 MMHG | DIASTOLIC BLOOD PRESSURE: 78 MMHG | OXYGEN SATURATION: 100 % | TEMPERATURE: 98 F | HEART RATE: 78 BPM

## 2024-02-17 DIAGNOSIS — R10.32 LEFT LOWER QUADRANT PAIN: ICD-10-CM

## 2024-02-17 DIAGNOSIS — K20.90 ESOPHAGITIS, UNSPECIFIED WITHOUT BLEEDING: ICD-10-CM

## 2024-02-17 DIAGNOSIS — I44.7 LEFT BUNDLE-BRANCH BLOCK, UNSPECIFIED: ICD-10-CM

## 2024-02-17 DIAGNOSIS — R11.2 NAUSEA WITH VOMITING, UNSPECIFIED: ICD-10-CM

## 2024-02-17 DIAGNOSIS — Z87.442 PERSONAL HISTORY OF URINARY CALCULI: ICD-10-CM

## 2024-02-17 DIAGNOSIS — N39.0 URINARY TRACT INFECTION, SITE NOT SPECIFIED: ICD-10-CM

## 2024-02-17 DIAGNOSIS — N20.0 CALCULUS OF KIDNEY: ICD-10-CM

## 2024-02-17 DIAGNOSIS — R10.12 LEFT UPPER QUADRANT PAIN: ICD-10-CM

## 2024-02-17 DIAGNOSIS — Z87.891 PERSONAL HISTORY OF NICOTINE DEPENDENCE: ICD-10-CM

## 2024-02-17 DIAGNOSIS — R00.1 BRADYCARDIA, UNSPECIFIED: ICD-10-CM

## 2024-02-17 LAB
ALBUMIN SERPL ELPH-MCNC: 4 G/DL — SIGNIFICANT CHANGE UP (ref 3.5–5.2)
ALP SERPL-CCNC: 76 U/L — SIGNIFICANT CHANGE UP (ref 30–115)
ALT FLD-CCNC: 8 U/L — SIGNIFICANT CHANGE UP (ref 0–41)
ANION GAP SERPL CALC-SCNC: 10 MMOL/L — SIGNIFICANT CHANGE UP (ref 7–14)
APPEARANCE UR: ABNORMAL
AST SERPL-CCNC: 13 U/L — SIGNIFICANT CHANGE UP (ref 0–41)
BACTERIA # UR AUTO: ABNORMAL /HPF
BASOPHILS # BLD AUTO: 0.06 K/UL — SIGNIFICANT CHANGE UP (ref 0–0.2)
BASOPHILS NFR BLD AUTO: 0.6 % — SIGNIFICANT CHANGE UP (ref 0–1)
BILIRUB SERPL-MCNC: 0.7 MG/DL — SIGNIFICANT CHANGE UP (ref 0.2–1.2)
BILIRUB UR-MCNC: NEGATIVE — SIGNIFICANT CHANGE UP
BUN SERPL-MCNC: 22 MG/DL — HIGH (ref 10–20)
CALCIUM SERPL-MCNC: 10 MG/DL — SIGNIFICANT CHANGE UP (ref 8.4–10.5)
CHLORIDE SERPL-SCNC: 103 MMOL/L — SIGNIFICANT CHANGE UP (ref 98–110)
CO2 SERPL-SCNC: 26 MMOL/L — SIGNIFICANT CHANGE UP (ref 17–32)
COLOR SPEC: YELLOW — SIGNIFICANT CHANGE UP
CREAT SERPL-MCNC: 1.4 MG/DL — SIGNIFICANT CHANGE UP (ref 0.7–1.5)
DIFF PNL FLD: ABNORMAL
EGFR: 38 ML/MIN/1.73M2 — LOW
EOSINOPHIL # BLD AUTO: 0.42 K/UL — SIGNIFICANT CHANGE UP (ref 0–0.7)
EOSINOPHIL NFR BLD AUTO: 4 % — SIGNIFICANT CHANGE UP (ref 0–8)
EPI CELLS # UR: PRESENT
GLUCOSE SERPL-MCNC: 152 MG/DL — HIGH (ref 70–99)
GLUCOSE UR QL: NEGATIVE MG/DL — SIGNIFICANT CHANGE UP
HCT VFR BLD CALC: 38.3 % — SIGNIFICANT CHANGE UP (ref 37–47)
HGB BLD-MCNC: 13.2 G/DL — SIGNIFICANT CHANGE UP (ref 12–16)
IMM GRANULOCYTES NFR BLD AUTO: 0.3 % — SIGNIFICANT CHANGE UP (ref 0.1–0.3)
KETONES UR-MCNC: NEGATIVE MG/DL — SIGNIFICANT CHANGE UP
LACTATE SERPL-SCNC: 1.2 MMOL/L — SIGNIFICANT CHANGE UP (ref 0.7–2)
LEUKOCYTE ESTERASE UR-ACNC: ABNORMAL
LIDOCAIN IGE QN: 40 U/L — SIGNIFICANT CHANGE UP (ref 7–60)
LYMPHOCYTES # BLD AUTO: 1.3 K/UL — SIGNIFICANT CHANGE UP (ref 1.2–3.4)
LYMPHOCYTES # BLD AUTO: 12.4 % — LOW (ref 20.5–51.1)
MCHC RBC-ENTMCNC: 33.8 PG — HIGH (ref 27–31)
MCHC RBC-ENTMCNC: 34.5 G/DL — SIGNIFICANT CHANGE UP (ref 32–37)
MCV RBC AUTO: 98.2 FL — SIGNIFICANT CHANGE UP (ref 81–99)
MONOCYTES # BLD AUTO: 0.49 K/UL — SIGNIFICANT CHANGE UP (ref 0.1–0.6)
MONOCYTES NFR BLD AUTO: 4.7 % — SIGNIFICANT CHANGE UP (ref 1.7–9.3)
NEUTROPHILS # BLD AUTO: 8.2 K/UL — HIGH (ref 1.4–6.5)
NEUTROPHILS NFR BLD AUTO: 78 % — HIGH (ref 42.2–75.2)
NITRITE UR-MCNC: NEGATIVE — SIGNIFICANT CHANGE UP
NRBC # BLD: 0 /100 WBCS — SIGNIFICANT CHANGE UP (ref 0–0)
PH UR: 7.5 — SIGNIFICANT CHANGE UP (ref 5–8)
PLATELET # BLD AUTO: 256 K/UL — SIGNIFICANT CHANGE UP (ref 130–400)
PMV BLD: 9.8 FL — SIGNIFICANT CHANGE UP (ref 7.4–10.4)
POTASSIUM SERPL-MCNC: 4.4 MMOL/L — SIGNIFICANT CHANGE UP (ref 3.5–5)
POTASSIUM SERPL-SCNC: 4.4 MMOL/L — SIGNIFICANT CHANGE UP (ref 3.5–5)
PROT SERPL-MCNC: 6.5 G/DL — SIGNIFICANT CHANGE UP (ref 6–8)
PROT UR-MCNC: SIGNIFICANT CHANGE UP MG/DL
RBC # BLD: 3.9 M/UL — LOW (ref 4.2–5.4)
RBC # FLD: 12.5 % — SIGNIFICANT CHANGE UP (ref 11.5–14.5)
RBC CASTS # UR COMP ASSIST: 60 /HPF — HIGH (ref 0–4)
SODIUM SERPL-SCNC: 139 MMOL/L — SIGNIFICANT CHANGE UP (ref 135–146)
SP GR SPEC: 1.02 — SIGNIFICANT CHANGE UP (ref 1–1.03)
UROBILINOGEN FLD QL: 0.2 MG/DL — SIGNIFICANT CHANGE UP (ref 0.2–1)
WBC # BLD: 10.5 K/UL — SIGNIFICANT CHANGE UP (ref 4.8–10.8)
WBC # FLD AUTO: 10.5 K/UL — SIGNIFICANT CHANGE UP (ref 4.8–10.8)
WBC UR QL: 99 /HPF — HIGH (ref 0–5)
YEAST-LIKE CELLS: PRESENT

## 2024-02-17 PROCEDURE — 74177 CT ABD & PELVIS W/CONTRAST: CPT | Mod: MA

## 2024-02-17 PROCEDURE — 81001 URINALYSIS AUTO W/SCOPE: CPT

## 2024-02-17 PROCEDURE — 87077 CULTURE AEROBIC IDENTIFY: CPT

## 2024-02-17 PROCEDURE — 80053 COMPREHEN METABOLIC PANEL: CPT

## 2024-02-17 PROCEDURE — 36415 COLL VENOUS BLD VENIPUNCTURE: CPT

## 2024-02-17 PROCEDURE — 83690 ASSAY OF LIPASE: CPT

## 2024-02-17 PROCEDURE — 93010 ELECTROCARDIOGRAM REPORT: CPT

## 2024-02-17 PROCEDURE — 83605 ASSAY OF LACTIC ACID: CPT

## 2024-02-17 PROCEDURE — 93005 ELECTROCARDIOGRAM TRACING: CPT

## 2024-02-17 PROCEDURE — 99285 EMERGENCY DEPT VISIT HI MDM: CPT | Mod: 25

## 2024-02-17 PROCEDURE — 99285 EMERGENCY DEPT VISIT HI MDM: CPT | Mod: FS

## 2024-02-17 PROCEDURE — 85025 COMPLETE CBC W/AUTO DIFF WBC: CPT

## 2024-02-17 PROCEDURE — 87186 SC STD MICRODIL/AGAR DIL: CPT

## 2024-02-17 PROCEDURE — 96375 TX/PRO/DX INJ NEW DRUG ADDON: CPT

## 2024-02-17 PROCEDURE — 74177 CT ABD & PELVIS W/CONTRAST: CPT | Mod: 26,MA

## 2024-02-17 PROCEDURE — 96374 THER/PROPH/DIAG INJ IV PUSH: CPT | Mod: XU

## 2024-02-17 PROCEDURE — 87086 URINE CULTURE/COLONY COUNT: CPT

## 2024-02-17 RX ORDER — ONDANSETRON 8 MG/1
4 TABLET, FILM COATED ORAL ONCE
Refills: 0 | Status: COMPLETED | OUTPATIENT
Start: 2024-02-17 | End: 2024-02-17

## 2024-02-17 RX ORDER — CEFPODOXIME PROXETIL 100 MG
200 TABLET ORAL ONCE
Refills: 0 | Status: COMPLETED | OUTPATIENT
Start: 2024-02-17 | End: 2024-02-17

## 2024-02-17 RX ORDER — PANTOPRAZOLE SODIUM 20 MG/1
40 TABLET, DELAYED RELEASE ORAL ONCE
Refills: 0 | Status: COMPLETED | OUTPATIENT
Start: 2024-02-17 | End: 2024-02-17

## 2024-02-17 RX ORDER — MORPHINE SULFATE 50 MG/1
4 CAPSULE, EXTENDED RELEASE ORAL ONCE
Refills: 0 | Status: DISCONTINUED | OUTPATIENT
Start: 2024-02-17 | End: 2024-02-17

## 2024-02-17 RX ORDER — CEFPODOXIME PROXETIL 100 MG
1 TABLET ORAL
Qty: 20 | Refills: 0
Start: 2024-02-17 | End: 2024-02-26

## 2024-02-17 RX ORDER — PANTOPRAZOLE SODIUM 20 MG/1
1 TABLET, DELAYED RELEASE ORAL
Qty: 30 | Refills: 0
Start: 2024-02-17 | End: 2024-03-17

## 2024-02-17 RX ORDER — SODIUM CHLORIDE 9 MG/ML
1000 INJECTION, SOLUTION INTRAVENOUS ONCE
Refills: 0 | Status: COMPLETED | OUTPATIENT
Start: 2024-02-17 | End: 2024-02-17

## 2024-02-17 RX ORDER — FAMOTIDINE 10 MG/ML
20 INJECTION INTRAVENOUS ONCE
Refills: 0 | Status: COMPLETED | OUTPATIENT
Start: 2024-02-17 | End: 2024-02-17

## 2024-02-17 RX ADMIN — FAMOTIDINE 100 MILLIGRAM(S): 10 INJECTION INTRAVENOUS at 21:45

## 2024-02-17 RX ADMIN — SODIUM CHLORIDE 1000 MILLILITER(S): 9 INJECTION, SOLUTION INTRAVENOUS at 19:15

## 2024-02-17 RX ADMIN — ONDANSETRON 4 MILLIGRAM(S): 8 TABLET, FILM COATED ORAL at 19:16

## 2024-02-17 RX ADMIN — Medication 200 MILLIGRAM(S): at 23:15

## 2024-02-17 RX ADMIN — MORPHINE SULFATE 4 MILLIGRAM(S): 50 CAPSULE, EXTENDED RELEASE ORAL at 19:15

## 2024-02-17 RX ADMIN — PANTOPRAZOLE SODIUM 40 MILLIGRAM(S): 20 TABLET, DELAYED RELEASE ORAL at 23:15

## 2024-02-17 NOTE — ED PROVIDER NOTE - CLINICAL SUMMARY MEDICAL DECISION MAKING FREE TEXT BOX
Labs and imaging were obtained.  Patient with normal white blood cell count, patient's urine has large leukocytes and 99 white blood cells.  Will treat with antibiotics.  On CT scan patient has a nonobstructive left-sided calculus which was present on previous studies.  Patient also found to have thickening of the distal esophagus at the GE junction.  Will treat with PPI.  Patient with that she will need to follow-up with GI and will need endoscopy for direct visualization.

## 2024-02-17 NOTE — ED PROVIDER NOTE - NSFOLLOWUPINSTRUCTIONS_ED_ALL_ED_FT
take meds as prescribed, Follow up with your PMD this week for UTI  and  GI MD for esophagitis     Our Emergency Department Referral Coordinators will be reaching out to you in the next 24-48 hours from 9:00am to 5:00pm with a follow up appointment. Please expect a phone call from the hospital in that time frame. If you do not receive a call or if you have any questions or concerns, you can reach them at [phone] or [phone].

## 2024-02-17 NOTE — ED ADULT NURSE NOTE - NSFALLUNIVINTERV_ED_ALL_ED
Bed/Stretcher in lowest position, wheels locked, appropriate side rails in place/Call bell, personal items and telephone in reach/Instruct patient to call for assistance before getting out of bed/chair/stretcher/Non-slip footwear applied when patient is off stretcher/[place] to call system/Physically safe environment - no spills, clutter or unnecessary equipment/Purposeful proactive rounding/Room/bathroom lighting operational, light cord in reach

## 2024-02-17 NOTE — ED PROVIDER NOTE - ATTENDING APP SHARED VISIT CONTRIBUTION OF CARE
78-year-old female past medical history noted presents for evaluation of left-sided abdominal pain associated with nausea and vomiting today.  Patient has a history of kidney stones in the past and this pain feels reminiscent.  Patient without dysuria but does report some hesitancy, no hematuria, no fever or chills, no diarrhea, on exam patient in NAD, AAOx3, abdomen is soft, nontender nondistended, no CVA tenderness, OP clear

## 2024-02-17 NOTE — ED PROVIDER NOTE - PATIENT PORTAL LINK FT
You can access the FollowMyHealth Patient Portal offered by [facility] by registering at the following website: [email or web address]. By joining [employer]’s FollowMyHealth portal, you will also be able to view your health information using other applications (apps) compatible with our system.

## 2024-02-20 LAB
-  AMPICILLIN: SIGNIFICANT CHANGE UP
-  CIPROFLOXACIN: SIGNIFICANT CHANGE UP
-  LEVOFLOXACIN: SIGNIFICANT CHANGE UP
-  NITROFURANTOIN: SIGNIFICANT CHANGE UP
-  TETRACYCLINE: SIGNIFICANT CHANGE UP
-  VANCOMYCIN: SIGNIFICANT CHANGE UP
CULTURE RESULTS: ABNORMAL
METHOD TYPE: SIGNIFICANT CHANGE UP
ORGANISM # SPEC MICROSCOPIC CNT: ABNORMAL
ORGANISM # SPEC MICROSCOPIC CNT: SIGNIFICANT CHANGE UP
SPECIMEN SOURCE: SIGNIFICANT CHANGE UP

## 2024-02-21 RX ORDER — NITROFURANTOIN MACROCRYSTAL 50 MG
1 CAPSULE ORAL
Qty: 14 | Refills: 0
Start: 2024-02-21 | End: 2024-02-27

## 2024-02-22 RX ORDER — NITROFURANTOIN MACROCRYSTAL 50 MG
1 CAPSULE ORAL
Qty: 14 | Refills: 0
Start: 2024-02-22 | End: 2024-02-28

## 2024-05-21 NOTE — ED ADULT TRIAGE NOTE - HEIGHT IN FEET
X Size Of Lesion In Cm (Optional): 0 Consent: The risks of atrophy were reviewed with the patient. Total Volume (Ccs): .25 Detail Level: Detailed Treatment Number (Optional): 1 Administered By (Optional): Kobe Dempsey Kenalog Preparation: Kenalog Kenalog Type Of Vial: Multiple Dose Medical Necessity Clause: This procedure was medically necessary because the lesions that were treated were: Validate Note Data When Using Inventory: Yes Require Ndc Code?: No Concentration Of Kenalog Solution Injected (Mg/Ml): 10.0 5

## 2024-08-26 NOTE — CDI QUERY NOTE - NSCDINOTEDOCCLARIFICATION_GEN_A_CORE
Dexcom g7 sent to pharmacy per pt request    PLEASE INCLUDE MORE SPECIFIC DOCUMENTATION IN YOUR PROGRESS NOTE AND DISCHARGE SUMMARY.  The documentation in this patient's medical record requires additional clarification to ensure that we accurately capture the patients diagnosis(es), treatment and/or severity of illness. Please document to the greatest level of specificity all corresponding diagnoses (either known or suspected) and/or treatment associated with the clinical information described below.

## 2025-02-18 NOTE — PROGRESS NOTE ADULT - REASON FOR ADMISSION
weakness, hypokalemia,  CKD
Her/She

## 2025-05-11 ENCOUNTER — EMERGENCY (EMERGENCY)
Facility: HOSPITAL | Age: 80
LOS: 0 days | Discharge: HOME | End: 2025-05-11
Attending: STUDENT IN AN ORGANIZED HEALTH CARE EDUCATION/TRAINING PROGRAM
Payer: MEDICARE

## 2025-05-11 VITALS
DIASTOLIC BLOOD PRESSURE: 73 MMHG | WEIGHT: 154.98 LBS | HEIGHT: 64 IN | HEART RATE: 69 BPM | RESPIRATION RATE: 18 BRPM | SYSTOLIC BLOOD PRESSURE: 127 MMHG | TEMPERATURE: 98 F | OXYGEN SATURATION: 95 %

## 2025-05-11 DIAGNOSIS — I50.9 HEART FAILURE, UNSPECIFIED: ICD-10-CM

## 2025-05-11 DIAGNOSIS — I13.0 HYPERTENSIVE HEART AND CHRONIC KIDNEY DISEASE WITH HEART FAILURE AND STAGE 1 THROUGH STAGE 4 CHRONIC KIDNEY DISEASE, OR UNSPECIFIED CHRONIC KIDNEY DISEASE: ICD-10-CM

## 2025-05-11 DIAGNOSIS — N18.30 CHRONIC KIDNEY DISEASE, STAGE 3 UNSPECIFIED: ICD-10-CM

## 2025-05-11 DIAGNOSIS — R39.15 URGENCY OF URINATION: ICD-10-CM

## 2025-05-11 DIAGNOSIS — Z87.442 PERSONAL HISTORY OF URINARY CALCULI: ICD-10-CM

## 2025-05-11 DIAGNOSIS — R50.9 FEVER, UNSPECIFIED: ICD-10-CM

## 2025-05-11 DIAGNOSIS — N20.0 CALCULUS OF KIDNEY: ICD-10-CM

## 2025-05-11 DIAGNOSIS — R53.83 OTHER FATIGUE: ICD-10-CM

## 2025-05-11 DIAGNOSIS — R11.2 NAUSEA WITH VOMITING, UNSPECIFIED: ICD-10-CM

## 2025-05-11 LAB
ALBUMIN SERPL ELPH-MCNC: 4 G/DL — SIGNIFICANT CHANGE UP (ref 3.5–5.2)
ALP SERPL-CCNC: 85 U/L — SIGNIFICANT CHANGE UP (ref 30–115)
ALT FLD-CCNC: <5 U/L — SIGNIFICANT CHANGE UP (ref 0–41)
ANION GAP SERPL CALC-SCNC: 15 MMOL/L — HIGH (ref 7–14)
APPEARANCE UR: CLEAR — SIGNIFICANT CHANGE UP
AST SERPL-CCNC: 12 U/L — SIGNIFICANT CHANGE UP (ref 0–41)
BASOPHILS # BLD AUTO: 0.05 K/UL — SIGNIFICANT CHANGE UP (ref 0–0.2)
BASOPHILS NFR BLD AUTO: 0.6 % — SIGNIFICANT CHANGE UP (ref 0–1)
BILIRUB SERPL-MCNC: 0.6 MG/DL — SIGNIFICANT CHANGE UP (ref 0.2–1.2)
BILIRUB UR-MCNC: NEGATIVE — SIGNIFICANT CHANGE UP
BUN SERPL-MCNC: 9 MG/DL — LOW (ref 10–20)
CALCIUM SERPL-MCNC: 9.9 MG/DL — SIGNIFICANT CHANGE UP (ref 8.4–10.5)
CHLORIDE SERPL-SCNC: 103 MMOL/L — SIGNIFICANT CHANGE UP (ref 98–110)
CO2 SERPL-SCNC: 21 MMOL/L — SIGNIFICANT CHANGE UP (ref 17–32)
COLOR SPEC: YELLOW — SIGNIFICANT CHANGE UP
CREAT SERPL-MCNC: 1.5 MG/DL — SIGNIFICANT CHANGE UP (ref 0.7–1.5)
DIFF PNL FLD: NEGATIVE — SIGNIFICANT CHANGE UP
EGFR: 35 ML/MIN/1.73M2 — LOW
EGFR: 35 ML/MIN/1.73M2 — LOW
EOSINOPHIL # BLD AUTO: 0.16 K/UL — SIGNIFICANT CHANGE UP (ref 0–0.7)
EOSINOPHIL NFR BLD AUTO: 2 % — SIGNIFICANT CHANGE UP (ref 0–8)
GLUCOSE SERPL-MCNC: 99 MG/DL — SIGNIFICANT CHANGE UP (ref 70–99)
GLUCOSE UR QL: NEGATIVE MG/DL — SIGNIFICANT CHANGE UP
HCT VFR BLD CALC: 39.9 % — SIGNIFICANT CHANGE UP (ref 37–47)
HGB BLD-MCNC: 13.3 G/DL — SIGNIFICANT CHANGE UP (ref 12–16)
IMM GRANULOCYTES NFR BLD AUTO: 0.5 % — HIGH (ref 0.1–0.3)
KETONES UR-MCNC: ABNORMAL MG/DL
LACTATE SERPL-SCNC: 1.2 MMOL/L — SIGNIFICANT CHANGE UP (ref 0.7–2)
LEUKOCYTE ESTERASE UR-ACNC: NEGATIVE — SIGNIFICANT CHANGE UP
LIDOCAIN IGE QN: 14 U/L — SIGNIFICANT CHANGE UP (ref 7–60)
LYMPHOCYTES # BLD AUTO: 0.88 K/UL — LOW (ref 1.2–3.4)
LYMPHOCYTES # BLD AUTO: 10.9 % — LOW (ref 20.5–51.1)
MCHC RBC-ENTMCNC: 32.7 PG — HIGH (ref 27–31)
MCHC RBC-ENTMCNC: 33.3 G/DL — SIGNIFICANT CHANGE UP (ref 32–37)
MCV RBC AUTO: 98 FL — SIGNIFICANT CHANGE UP (ref 81–99)
MONOCYTES # BLD AUTO: 0.51 K/UL — SIGNIFICANT CHANGE UP (ref 0.1–0.6)
MONOCYTES NFR BLD AUTO: 6.3 % — SIGNIFICANT CHANGE UP (ref 1.7–9.3)
NEUTROPHILS # BLD AUTO: 6.44 K/UL — SIGNIFICANT CHANGE UP (ref 1.4–6.5)
NEUTROPHILS NFR BLD AUTO: 79.7 % — HIGH (ref 42.2–75.2)
NITRITE UR-MCNC: NEGATIVE — SIGNIFICANT CHANGE UP
NRBC BLD AUTO-RTO: 0 /100 WBCS — SIGNIFICANT CHANGE UP (ref 0–0)
PH UR: 6 — SIGNIFICANT CHANGE UP (ref 5–8)
PLATELET # BLD AUTO: 250 K/UL — SIGNIFICANT CHANGE UP (ref 130–400)
PMV BLD: 10.5 FL — HIGH (ref 7.4–10.4)
POTASSIUM SERPL-MCNC: 4.2 MMOL/L — SIGNIFICANT CHANGE UP (ref 3.5–5)
POTASSIUM SERPL-SCNC: 4.2 MMOL/L — SIGNIFICANT CHANGE UP (ref 3.5–5)
PROT SERPL-MCNC: 6.4 G/DL — SIGNIFICANT CHANGE UP (ref 6–8)
PROT UR-MCNC: NEGATIVE MG/DL — SIGNIFICANT CHANGE UP
RBC # BLD: 4.07 M/UL — LOW (ref 4.2–5.4)
RBC # FLD: 12.1 % — SIGNIFICANT CHANGE UP (ref 11.5–14.5)
SODIUM SERPL-SCNC: 139 MMOL/L — SIGNIFICANT CHANGE UP (ref 135–146)
SP GR SPEC: 1.01 — SIGNIFICANT CHANGE UP (ref 1–1.03)
UROBILINOGEN FLD QL: 0.2 MG/DL — SIGNIFICANT CHANGE UP (ref 0.2–1)
WBC # BLD: 8.08 K/UL — SIGNIFICANT CHANGE UP (ref 4.8–10.8)
WBC # FLD AUTO: 8.08 K/UL — SIGNIFICANT CHANGE UP (ref 4.8–10.8)

## 2025-05-11 PROCEDURE — 80053 COMPREHEN METABOLIC PANEL: CPT

## 2025-05-11 PROCEDURE — 83690 ASSAY OF LIPASE: CPT

## 2025-05-11 PROCEDURE — 87086 URINE CULTURE/COLONY COUNT: CPT

## 2025-05-11 PROCEDURE — 36415 COLL VENOUS BLD VENIPUNCTURE: CPT

## 2025-05-11 PROCEDURE — 96375 TX/PRO/DX INJ NEW DRUG ADDON: CPT

## 2025-05-11 PROCEDURE — 99284 EMERGENCY DEPT VISIT MOD MDM: CPT | Mod: 25

## 2025-05-11 PROCEDURE — 96374 THER/PROPH/DIAG INJ IV PUSH: CPT | Mod: XU

## 2025-05-11 PROCEDURE — 81003 URINALYSIS AUTO W/O SCOPE: CPT

## 2025-05-11 PROCEDURE — 99285 EMERGENCY DEPT VISIT HI MDM: CPT

## 2025-05-11 PROCEDURE — 85025 COMPLETE CBC W/AUTO DIFF WBC: CPT

## 2025-05-11 PROCEDURE — 83605 ASSAY OF LACTIC ACID: CPT

## 2025-05-11 PROCEDURE — 74177 CT ABD & PELVIS W/CONTRAST: CPT

## 2025-05-11 PROCEDURE — 74177 CT ABD & PELVIS W/CONTRAST: CPT | Mod: 26

## 2025-05-11 RX ORDER — ONDANSETRON HCL/PF 4 MG/2 ML
4 VIAL (ML) INJECTION ONCE
Refills: 0 | Status: COMPLETED | OUTPATIENT
Start: 2025-05-11 | End: 2025-05-11

## 2025-05-11 RX ORDER — SODIUM CHLORIDE 9 G/1000ML
1000 INJECTION, SOLUTION INTRAVENOUS ONCE
Refills: 0 | Status: COMPLETED | OUTPATIENT
Start: 2025-05-11 | End: 2025-05-11

## 2025-05-11 RX ORDER — ONDANSETRON HCL/PF 4 MG/2 ML
1 VIAL (ML) INJECTION
Qty: 12 | Refills: 0
Start: 2025-05-11 | End: 2025-05-14

## 2025-05-11 RX ORDER — KETOROLAC TROMETHAMINE 30 MG/ML
15 INJECTION, SOLUTION INTRAMUSCULAR; INTRAVENOUS ONCE
Refills: 0 | Status: DISCONTINUED | OUTPATIENT
Start: 2025-05-11 | End: 2025-05-11

## 2025-05-11 RX ADMIN — KETOROLAC TROMETHAMINE 15 MILLIGRAM(S): 30 INJECTION, SOLUTION INTRAMUSCULAR; INTRAVENOUS at 13:16

## 2025-05-11 RX ADMIN — SODIUM CHLORIDE 1000 MILLILITER(S): 9 INJECTION, SOLUTION INTRAVENOUS at 13:16

## 2025-05-11 RX ADMIN — Medication 4 MILLIGRAM(S): at 13:16

## 2025-05-11 NOTE — ED PROVIDER NOTE - PHYSICAL EXAMINATION
VITAL SIGNS: I have reviewed nursing notes and confirm.  CONSTITUTIONAL: Well-appearing, non-toxic, in NAD  SKIN: Warm dry, normal skin turgor  HEAD: NCAT  EYES: No conjunctival injection, scleral anicteric  ENT: Moist mucous membranes, normal pharynx with no erythema or exudates  NECK: Supple; full ROM. Nontender. No cervical LAD  CARD: RRR, no murmurs, rubs or gallops  RESP: Clear to ausculation bilaterally.  No rales, rhonchi, or wheezing.  ABD: Soft, non-distended, mild-tenderness, no CVA tenderness  EXT: Full ROM

## 2025-05-11 NOTE — ED PROVIDER NOTE - NSDCPRINTRESULTS_ED_ALL_ED
1) Angelia Sandhoff is the [employer]. Please contact her at [phone] to help with assistance in getting this product. 2) Your Hemoglobin A1c (3 month test of blood sugar) was the best it's been in 8 years. Keep up the good work. Patient requests all Lab, Cardiology, and Radiology Results on their Discharge Instructions

## 2025-05-11 NOTE — ED PROVIDER NOTE - OBJECTIVE STATEMENT
Patient is an 79 year old female with PMH of HTN. Hypothyroidism, Kidney Stones, CKD Stage 3, and CHF presenting for medical evaluation. Patient is presenting to the ER today for urinary urgency, nausea, nbnb vomiting, and subjective chills x2 days. Per son, patient is currently being treated for recurring UTIs for the past month; had a recent admission approximately one month ago at [facility] for sepsis secondary to UTI. Since then patient spent a few weeks at [facility] facility where she had another UTI that was appropriately treated. A few days ago patient began to complain of urinary frequency and was again started on an antibiotic (Levofloxacin on day 2/5). Patient states she also had imaging performed and was told she had a non-obstructing stone. Patient denies fevers, diarrhea, chest pain, shortness of breath, flank tenderness, or additional complaints.

## 2025-05-11 NOTE — ED PROVIDER NOTE - CARE PLAN
Principal Discharge DX:	Urinary urgency  Secondary Diagnosis:	Nausea & vomiting  Secondary Diagnosis:	Kidney stone   1

## 2025-05-11 NOTE — ED PROVIDER NOTE - CLINICAL SUMMARY MEDICAL DECISION MAKING FREE TEXT BOX
.    79-year-old female, PMH HTN, CKD, CHF, renal stones, thyroid disorder, presents with generalized fatigue, decreased p.o., and nausea for the past few days.  + Subjective chills.  + Vague abdominal discomfort.  Has had some recent urinary frequency.  Has been treated for UTI recently including admission at [facility], and then discharged to [facility] rehab, where she currently resides.  Patient currently taking levofloxacin for UTI.  Patient denies fever, chest pain shortness of breath     Exam as noted above patient is NAD, CTAB, RRR, abdomen soft nontender no rebound or guarding.    Differential diagnosis includes but not limited to UTI, electrode abnormalities, dehydration, other intra-abdominal infection.    Additional information taken from son at the bedside confirming PMH    All available lab tests, imaging tests, and EKGs independently reviewed and interpreted by meSam.  UA negative  CT imaging shows nonobstructing renal stones, otherwise no acute severe pathology.    No acute ED process.    IMP: Fatigue, nausea, renal stones.  History of  urinary urgency  Unclear etiology of symptoms.  However patient is stable for discharge continue outpatient follow-up, supportive care and return precautions.  Patient discharged back to [facility]      .

## 2025-05-11 NOTE — ED PROVIDER NOTE - PATIENT PORTAL LINK FT
You can access the FollowMyHealth Patient Portal offered by [facility] by registering at the following website: [email or web address]. By joining [employer]’s FollowMyHealth portal, you will also be able to view your health information using other applications (apps) compatible with our system.

## 2025-05-11 NOTE — ED PROVIDER NOTE - PROVIDER TOKENS
Done.    For Pharmacy Admin Tracking Only    Intervention Detail: Refill(s) Provided  Total # of Interventions Recommended: 1  Total # of Interventions Accepted: 1  Time Spent (min): 5    
PROVIDER:[TOKEN:[[id number]],FOLLOWUP:[Routine]]

## 2025-05-11 NOTE — ED PROVIDER NOTE - NSFOLLOWUPINSTRUCTIONS_ED_ALL_ED_FT
Our Emergency Department Referral Coordinators will be reaching out to you in the next 24-48 hours from 9:00am to 5:00pm with a follow up appointment. Please expect a phone call from the hospital in that time frame. If you do not receive a call or if you have any questions or concerns, you can reach them at   [phone]    Kidney Stones    Kidney stones (urolithiasis) are deposits that form inside your kidneys. The intense pain is caused by the stone moving through the urinary tract. When the stone moves, the ureter goes into spasm around the stone. The stone is usually passed in the urine. Symptoms include abdominal, side, or back pain, nausea, vomiting, blood in the urine, frequency with urination. Drink enough water and fluids to keep your urine clear or pale yellow. This will help you to pass the stone or stone fragments.    SEEK IMMEDIATE MEDICAL CARE IF YOU HAVE THE FOLLOWING SYMPTOMS: pain not controlled with medication, fever/chills, worsening vomiting, inability to urinate, or dizziness/lightheadedness.

## 2025-05-11 NOTE — ED PROVIDER NOTE - NSPTACCESSSVCSAPPTDETAILS_ED_ALL_ED_FT
several non-obstructing stones with recurring UTIs; Son prefers to be the point of contact: Rafael Lagunas ([phone])

## 2025-05-12 NOTE — CHART NOTE - NSCHARTNOTEFT_GEN_A_CORE
[facility] MRN [id number] / Pt already established care 5/12 - JL    SPECIALTY: urology

## 2025-05-13 LAB
CULTURE RESULTS: SIGNIFICANT CHANGE UP
SPECIMEN SOURCE: SIGNIFICANT CHANGE UP

## 2025-05-29 PROBLEM — Z00.00 ENCOUNTER FOR PREVENTIVE HEALTH EXAMINATION: Status: ACTIVE | Noted: 2025-05-29

## 2025-06-06 ENCOUNTER — TRANSCRIPTION ENCOUNTER (OUTPATIENT)
Age: 80
End: 2025-06-06

## 2025-06-06 ENCOUNTER — APPOINTMENT (OUTPATIENT)
Dept: UROLOGY | Facility: CLINIC | Age: 80
End: 2025-06-06
Payer: MEDICARE

## 2025-06-06 VITALS
HEIGHT: 64 IN | HEART RATE: 62 BPM | BODY MASS INDEX: 24.75 KG/M2 | DIASTOLIC BLOOD PRESSURE: 74 MMHG | WEIGHT: 145 LBS | SYSTOLIC BLOOD PRESSURE: 132 MMHG | OXYGEN SATURATION: 97 %

## 2025-06-06 PROCEDURE — 99205 OFFICE O/P NEW HI 60 MIN: CPT | Mod: 25

## 2025-06-06 PROCEDURE — 99401 PREV MED CNSL INDIV APPRX 15: CPT

## 2025-06-06 RX ORDER — CEPHALEXIN 500 MG/1
500 CAPSULE ORAL 3 TIMES DAILY
Qty: 9 | Refills: 0 | Status: ACTIVE | COMMUNITY
Start: 2025-06-06 | End: 1900-01-01

## 2025-06-06 RX ORDER — CONJUGATED ESTROGENS 0.62 MG/G
0.62 CREAM VAGINAL
Qty: 1 | Refills: 3 | Status: ACTIVE | COMMUNITY
Start: 2025-06-06 | End: 1900-01-01

## 2025-06-06 RX ORDER — TAMSULOSIN HYDROCHLORIDE 0.4 MG/1
0.4 CAPSULE ORAL
Qty: 20 | Refills: 0 | Status: ACTIVE | COMMUNITY
Start: 2025-06-06 | End: 1900-01-01

## 2025-06-06 RX ORDER — AMLODIPINE BESYLATE 5 MG/1
TABLET ORAL
Refills: 0 | Status: ACTIVE | COMMUNITY

## 2025-06-06 RX ORDER — LEVOTHYROXINE SODIUM 137 UG/1
TABLET ORAL
Refills: 0 | Status: ACTIVE | COMMUNITY

## 2025-06-06 RX ORDER — METOPROLOL SUCCINATE 200 MG/1
TABLET, EXTENDED RELEASE ORAL
Refills: 0 | Status: ACTIVE | COMMUNITY

## 2025-06-06 RX ORDER — DICLOFENAC SODIUM 50 MG/1
50 TABLET, DELAYED RELEASE ORAL
Qty: 10 | Refills: 0 | Status: ACTIVE | COMMUNITY
Start: 2025-06-06 | End: 1900-01-01

## 2025-06-11 RX ORDER — ONDANSETRON 4 MG/1
4 TABLET, ORALLY DISINTEGRATING ORAL
Qty: 20 | Refills: 0 | Status: ACTIVE | COMMUNITY
Start: 2025-06-11 | End: 1900-01-01

## 2025-08-18 ENCOUNTER — OUTPATIENT (OUTPATIENT)
Dept: OUTPATIENT SERVICES | Facility: HOSPITAL | Age: 80
LOS: 1 days | End: 2025-08-18
Payer: MEDICARE

## 2025-08-18 VITALS
TEMPERATURE: 98 F | RESPIRATION RATE: 16 BRPM | DIASTOLIC BLOOD PRESSURE: 75 MMHG | SYSTOLIC BLOOD PRESSURE: 124 MMHG | WEIGHT: 143.96 LBS | OXYGEN SATURATION: 95 % | HEIGHT: 65 IN | HEART RATE: 58 BPM

## 2025-08-18 DIAGNOSIS — Z01.818 ENCOUNTER FOR OTHER PREPROCEDURAL EXAMINATION: ICD-10-CM

## 2025-08-18 DIAGNOSIS — Z98.890 OTHER SPECIFIED POSTPROCEDURAL STATES: Chronic | ICD-10-CM

## 2025-08-18 DIAGNOSIS — N20.2 CALCULUS OF KIDNEY WITH CALCULUS OF URETER: ICD-10-CM

## 2025-08-18 LAB
ALBUMIN SERPL ELPH-MCNC: 4.5 G/DL — SIGNIFICANT CHANGE UP (ref 3.5–5.2)
ALP SERPL-CCNC: 65 U/L — SIGNIFICANT CHANGE UP (ref 30–115)
ALT FLD-CCNC: 9 U/L — SIGNIFICANT CHANGE UP (ref 0–41)
ANION GAP SERPL CALC-SCNC: 13 MMOL/L — SIGNIFICANT CHANGE UP (ref 7–14)
APPEARANCE UR: CLEAR — SIGNIFICANT CHANGE UP
AST SERPL-CCNC: 17 U/L — SIGNIFICANT CHANGE UP (ref 0–41)
BASOPHILS # BLD AUTO: 0.06 K/UL — SIGNIFICANT CHANGE UP (ref 0–0.2)
BASOPHILS NFR BLD AUTO: 0.8 % — SIGNIFICANT CHANGE UP (ref 0–1)
BILIRUB SERPL-MCNC: 0.6 MG/DL — SIGNIFICANT CHANGE UP (ref 0.2–1.2)
BILIRUB UR-MCNC: NEGATIVE — SIGNIFICANT CHANGE UP
BUN SERPL-MCNC: 18 MG/DL — SIGNIFICANT CHANGE UP (ref 10–20)
CALCIUM SERPL-MCNC: 10 MG/DL — SIGNIFICANT CHANGE UP (ref 8.4–10.5)
CHLORIDE SERPL-SCNC: 108 MMOL/L — SIGNIFICANT CHANGE UP (ref 98–110)
CO2 SERPL-SCNC: 21 MMOL/L — SIGNIFICANT CHANGE UP (ref 17–32)
COLOR SPEC: YELLOW — SIGNIFICANT CHANGE UP
CREAT SERPL-MCNC: 1.3 MG/DL — SIGNIFICANT CHANGE UP (ref 0.7–1.5)
DIFF PNL FLD: NEGATIVE — SIGNIFICANT CHANGE UP
EGFR: 42 ML/MIN/1.73M2 — LOW
EGFR: 42 ML/MIN/1.73M2 — LOW
EOSINOPHIL # BLD AUTO: 0.49 K/UL — SIGNIFICANT CHANGE UP (ref 0–0.7)
EOSINOPHIL NFR BLD AUTO: 6.5 % — SIGNIFICANT CHANGE UP (ref 0–8)
GLUCOSE SERPL-MCNC: 124 MG/DL — HIGH (ref 70–99)
GLUCOSE UR QL: NEGATIVE MG/DL — SIGNIFICANT CHANGE UP
HCT VFR BLD CALC: 41.8 % — SIGNIFICANT CHANGE UP (ref 37–47)
HGB BLD-MCNC: 13.7 G/DL — SIGNIFICANT CHANGE UP (ref 12–16)
IMM GRANULOCYTES NFR BLD AUTO: 0.3 % — SIGNIFICANT CHANGE UP (ref 0.1–0.3)
KETONES UR QL: NEGATIVE MG/DL — SIGNIFICANT CHANGE UP
LEUKOCYTE ESTERASE UR-ACNC: NEGATIVE — SIGNIFICANT CHANGE UP
LYMPHOCYTES # BLD AUTO: 2.28 K/UL — SIGNIFICANT CHANGE UP (ref 1.2–3.4)
LYMPHOCYTES # BLD AUTO: 30.3 % — SIGNIFICANT CHANGE UP (ref 20.5–51.1)
MCHC RBC-ENTMCNC: 32.5 PG — HIGH (ref 27–31)
MCHC RBC-ENTMCNC: 32.8 G/DL — SIGNIFICANT CHANGE UP (ref 32–37)
MCV RBC AUTO: 99.1 FL — HIGH (ref 81–99)
MONOCYTES # BLD AUTO: 0.6 K/UL — SIGNIFICANT CHANGE UP (ref 0.1–0.6)
MONOCYTES NFR BLD AUTO: 8 % — SIGNIFICANT CHANGE UP (ref 1.7–9.3)
NEUTROPHILS # BLD AUTO: 4.08 K/UL — SIGNIFICANT CHANGE UP (ref 1.4–6.5)
NEUTROPHILS NFR BLD AUTO: 54.1 % — SIGNIFICANT CHANGE UP (ref 42.2–75.2)
NITRITE UR-MCNC: NEGATIVE — SIGNIFICANT CHANGE UP
NRBC BLD AUTO-RTO: 0 /100 WBCS — SIGNIFICANT CHANGE UP (ref 0–0)
PH UR: 6 — SIGNIFICANT CHANGE UP (ref 5–8)
PLATELET # BLD AUTO: 232 K/UL — SIGNIFICANT CHANGE UP (ref 130–400)
PMV BLD: 10.1 FL — SIGNIFICANT CHANGE UP (ref 7.4–10.4)
POTASSIUM SERPL-MCNC: 4.6 MMOL/L — SIGNIFICANT CHANGE UP (ref 3.5–5)
POTASSIUM SERPL-SCNC: 4.6 MMOL/L — SIGNIFICANT CHANGE UP (ref 3.5–5)
PROT SERPL-MCNC: 7 G/DL — SIGNIFICANT CHANGE UP (ref 6–8)
PROT UR-MCNC: NEGATIVE MG/DL — SIGNIFICANT CHANGE UP
RBC # BLD: 4.22 M/UL — SIGNIFICANT CHANGE UP (ref 4.2–5.4)
RBC # FLD: 14.1 % — SIGNIFICANT CHANGE UP (ref 11.5–14.5)
SODIUM SERPL-SCNC: 142 MMOL/L — SIGNIFICANT CHANGE UP (ref 135–146)
SP GR SPEC: 1.01 — SIGNIFICANT CHANGE UP (ref 1–1.03)
UROBILINOGEN FLD QL: 0.2 MG/DL — SIGNIFICANT CHANGE UP (ref 0.2–1)
WBC # BLD: 7.53 K/UL — SIGNIFICANT CHANGE UP (ref 4.8–10.8)
WBC # FLD AUTO: 7.53 K/UL — SIGNIFICANT CHANGE UP (ref 4.8–10.8)

## 2025-08-18 PROCEDURE — 85025 COMPLETE CBC W/AUTO DIFF WBC: CPT

## 2025-08-18 PROCEDURE — 36415 COLL VENOUS BLD VENIPUNCTURE: CPT

## 2025-08-18 PROCEDURE — 93010 ELECTROCARDIOGRAM REPORT: CPT

## 2025-08-18 PROCEDURE — 80053 COMPREHEN METABOLIC PANEL: CPT

## 2025-08-18 PROCEDURE — 87086 URINE CULTURE/COLONY COUNT: CPT

## 2025-08-18 PROCEDURE — 93005 ELECTROCARDIOGRAM TRACING: CPT

## 2025-08-18 PROCEDURE — 87077 CULTURE AEROBIC IDENTIFY: CPT

## 2025-08-18 PROCEDURE — 99214 OFFICE O/P EST MOD 30 MIN: CPT | Mod: 25

## 2025-08-18 PROCEDURE — 87186 SC STD MICRODIL/AGAR DIL: CPT

## 2025-08-18 PROCEDURE — 81003 URINALYSIS AUTO W/O SCOPE: CPT

## 2025-08-19 DIAGNOSIS — N20.2 CALCULUS OF KIDNEY WITH CALCULUS OF URETER: ICD-10-CM

## 2025-08-19 DIAGNOSIS — Z01.818 ENCOUNTER FOR OTHER PREPROCEDURAL EXAMINATION: ICD-10-CM

## 2025-08-20 PROBLEM — G43.909 MIGRAINE, UNSPECIFIED, NOT INTRACTABLE, WITHOUT STATUS MIGRAINOSUS: Chronic | Status: ACTIVE | Noted: 2025-08-18

## 2025-08-20 PROBLEM — I82.409 ACUTE EMBOLISM AND THROMBOSIS OF UNSPECIFIED DEEP VEINS OF UNSPECIFIED LOWER EXTREMITY: Chronic | Status: ACTIVE | Noted: 2025-08-18

## 2025-08-20 PROBLEM — I71.20 THORACIC AORTIC ANEURYSM, WITHOUT RUPTURE, UNSPECIFIED: Chronic | Status: ACTIVE | Noted: 2025-08-18

## 2025-08-21 ENCOUNTER — APPOINTMENT (OUTPATIENT)
Dept: CARDIOLOGY | Facility: CLINIC | Age: 80
End: 2025-08-21

## 2025-08-21 ENCOUNTER — RESULT CHARGE (OUTPATIENT)
Age: 80
End: 2025-08-21

## 2025-08-21 ENCOUNTER — NON-APPOINTMENT (OUTPATIENT)
Age: 80
End: 2025-08-21

## 2025-08-21 VITALS
DIASTOLIC BLOOD PRESSURE: 70 MMHG | BODY MASS INDEX: 25.61 KG/M2 | SYSTOLIC BLOOD PRESSURE: 130 MMHG | HEIGHT: 64 IN | WEIGHT: 150 LBS | HEART RATE: 52 BPM

## 2025-08-21 DIAGNOSIS — Z01.818 ENCOUNTER FOR OTHER PREPROCEDURAL EXAMINATION: ICD-10-CM

## 2025-08-21 DIAGNOSIS — Z82.49 FAMILY HISTORY OF ISCHEMIC HEART DISEASE AND OTHER DISEASES OF THE CIRCULATORY SYSTEM: ICD-10-CM

## 2025-08-21 DIAGNOSIS — Z78.9 OTHER SPECIFIED HEALTH STATUS: ICD-10-CM

## 2025-08-21 DIAGNOSIS — Z86.79 PERSONAL HISTORY OF OTHER DISEASES OF THE CIRCULATORY SYSTEM: ICD-10-CM

## 2025-08-21 DIAGNOSIS — E03.9 HYPOTHYROIDISM, UNSPECIFIED: ICD-10-CM

## 2025-08-21 DIAGNOSIS — I10 ESSENTIAL (PRIMARY) HYPERTENSION: ICD-10-CM

## 2025-08-21 PROCEDURE — 93000 ELECTROCARDIOGRAM COMPLETE: CPT

## 2025-08-21 PROCEDURE — 93306 TTE W/DOPPLER COMPLETE: CPT

## 2025-08-21 PROCEDURE — 99214 OFFICE O/P EST MOD 30 MIN: CPT

## 2025-08-21 RX ORDER — AMOXICILLIN AND CLAVULANATE POTASSIUM 875; 125 MG/1; MG/1
875-125 TABLET, COATED ORAL
Qty: 20 | Refills: 0 | Status: ACTIVE | COMMUNITY
Start: 2025-08-21 | End: 1900-01-01

## 2025-08-25 ENCOUNTER — OUTPATIENT (OUTPATIENT)
Dept: OUTPATIENT SERVICES | Facility: HOSPITAL | Age: 80
LOS: 1 days | End: 2025-08-25
Payer: MEDICARE

## 2025-08-25 ENCOUNTER — APPOINTMENT (OUTPATIENT)
Dept: CV DIAGNOSTICS | Facility: HOSPITAL | Age: 80
End: 2025-08-25
Payer: MEDICARE

## 2025-08-25 ENCOUNTER — RESULT REVIEW (OUTPATIENT)
Age: 80
End: 2025-08-25

## 2025-08-25 DIAGNOSIS — Z01.818 ENCOUNTER FOR OTHER PREPROCEDURAL EXAMINATION: ICD-10-CM

## 2025-08-25 DIAGNOSIS — Z98.890 OTHER SPECIFIED POSTPROCEDURAL STATES: Chronic | ICD-10-CM

## 2025-08-25 PROCEDURE — 78452 HT MUSCLE IMAGE SPECT MULT: CPT | Mod: 26

## 2025-08-25 PROCEDURE — 93018 CV STRESS TEST I&R ONLY: CPT

## 2025-08-25 PROCEDURE — 93016 CV STRESS TEST SUPVJ ONLY: CPT

## 2025-08-25 PROCEDURE — 78452 HT MUSCLE IMAGE SPECT MULT: CPT

## 2025-08-25 PROCEDURE — 93017 CV STRESS TEST TRACING ONLY: CPT

## 2025-08-25 PROCEDURE — A9500: CPT

## 2025-08-25 RX ORDER — REGADENOSON 0.08 MG/ML
0.4 INJECTION, SOLUTION INTRAVENOUS ONCE
Refills: 0 | Status: DISCONTINUED | OUTPATIENT
Start: 2025-08-25 | End: 2025-09-08

## 2025-08-26 ENCOUNTER — OUTPATIENT (OUTPATIENT)
Dept: OUTPATIENT SERVICES | Facility: HOSPITAL | Age: 80
LOS: 1 days | End: 2025-08-26
Payer: MEDICARE

## 2025-08-26 ENCOUNTER — RESULT REVIEW (OUTPATIENT)
Age: 80
End: 2025-08-26

## 2025-08-26 DIAGNOSIS — I71.20 THORACIC AORTIC ANEURYSM, WITHOUT RUPTURE, UNSPECIFIED: ICD-10-CM

## 2025-08-26 DIAGNOSIS — Z00.8 ENCOUNTER FOR OTHER GENERAL EXAMINATION: ICD-10-CM

## 2025-08-26 DIAGNOSIS — Z01.818 ENCOUNTER FOR OTHER PREPROCEDURAL EXAMINATION: ICD-10-CM

## 2025-08-26 LAB
ANION GAP SERPL CALC-SCNC: 11 MMOL/L
BUN SERPL-MCNC: 16 MG/DL
CALCIUM SERPL-MCNC: 10.2 MG/DL
CHLORIDE SERPL-SCNC: 108 MMOL/L
CHOLEST SERPL-MCNC: 199 MG/DL
CO2 SERPL-SCNC: 23 MMOL/L
CREAT SERPL-MCNC: 1.3 MG/DL
EGFRCR SERPLBLD CKD-EPI 2021: 42 ML/MIN/1.73M2
ESTIMATED AVERAGE GLUCOSE: 103 MG/DL
GLUCOSE SERPL-MCNC: 96 MG/DL
HBA1C MFR BLD HPLC: 5.2 %
HDLC SERPL-MCNC: 71 MG/DL
LDLC SERPL-MCNC: 112 MG/DL
NONHDLC SERPL-MCNC: 128 MG/DL
POTASSIUM SERPL-SCNC: 4.6 MMOL/L
SODIUM SERPL-SCNC: 142 MMOL/L
TRIGL SERPL-MCNC: 91 MG/DL

## 2025-08-26 PROCEDURE — 76706 US ABDL AORTA SCREEN AAA: CPT

## 2025-08-26 PROCEDURE — 76706 US ABDL AORTA SCREEN AAA: CPT | Mod: 26

## 2025-08-27 DIAGNOSIS — I71.20 THORACIC AORTIC ANEURYSM, WITHOUT RUPTURE, UNSPECIFIED: ICD-10-CM

## 2025-08-30 LAB — BACTERIA UR CULT: NORMAL

## 2025-09-02 ENCOUNTER — OUTPATIENT (OUTPATIENT)
Dept: OUTPATIENT SERVICES | Facility: HOSPITAL | Age: 80
LOS: 1 days | Discharge: HOME | End: 2025-09-02
Payer: MEDICARE

## 2025-09-02 ENCOUNTER — TRANSCRIPTION ENCOUNTER (OUTPATIENT)
Age: 80
End: 2025-09-02

## 2025-09-02 ENCOUNTER — APPOINTMENT (OUTPATIENT)
Dept: UROLOGY | Facility: HOSPITAL | Age: 80
End: 2025-09-02

## 2025-09-02 VITALS
DIASTOLIC BLOOD PRESSURE: 76 MMHG | HEART RATE: 64 BPM | TEMPERATURE: 98 F | RESPIRATION RATE: 17 BRPM | SYSTOLIC BLOOD PRESSURE: 149 MMHG | WEIGHT: 143.96 LBS | OXYGEN SATURATION: 97 % | HEIGHT: 65 IN

## 2025-09-02 VITALS — SYSTOLIC BLOOD PRESSURE: 121 MMHG | HEART RATE: 60 BPM | DIASTOLIC BLOOD PRESSURE: 65 MMHG

## 2025-09-02 DIAGNOSIS — Z98.890 OTHER SPECIFIED POSTPROCEDURAL STATES: Chronic | ICD-10-CM

## 2025-09-02 DIAGNOSIS — N20.2 CALCULUS OF KIDNEY WITH CALCULUS OF URETER: ICD-10-CM

## 2025-09-02 PROCEDURE — C9399: CPT

## 2025-09-02 PROCEDURE — 74420 UROGRAPHY RTRGR +-KUB: CPT | Mod: 26

## 2025-09-02 PROCEDURE — 82365 CALCULUS SPECTROSCOPY: CPT | Mod: 91

## 2025-09-02 PROCEDURE — C1889: CPT

## 2025-09-02 PROCEDURE — 52356 CYSTO/URETERO W/LITHOTRIPSY: CPT | Mod: 50,22

## 2025-09-02 PROCEDURE — 53899C: CUSTOM

## 2025-09-02 PROCEDURE — C1758: CPT

## 2025-09-02 PROCEDURE — C1769: CPT

## 2025-09-02 PROCEDURE — C1747: CPT

## 2025-09-02 PROCEDURE — C2617: CPT

## 2025-09-02 RX ORDER — HYDROMORPHONE/SOD CHLOR,ISO/PF 2 MG/10 ML
0.5 SYRINGE (ML) INJECTION
Refills: 0 | Status: DISCONTINUED | OUTPATIENT
Start: 2025-09-02 | End: 2025-09-03

## 2025-09-02 RX ORDER — TAMSULOSIN HYDROCHLORIDE 0.4 MG/1
0 CAPSULE ORAL
Qty: 0 | Refills: 0 | DISCHARGE

## 2025-09-02 RX ORDER — HYDROMORPHONE/SOD CHLOR,ISO/PF 2 MG/10 ML
1 SYRINGE (ML) INJECTION
Refills: 0 | Status: DISCONTINUED | OUTPATIENT
Start: 2025-09-02 | End: 2025-09-03

## 2025-09-02 RX ORDER — AMLODIPINE BESYLATE 10 MG/1
0 TABLET ORAL
Qty: 0 | Refills: 0 | DISCHARGE

## 2025-09-02 RX ORDER — LEVOTHYROXINE SODIUM 300 MCG
0 TABLET ORAL
Qty: 0 | Refills: 0 | DISCHARGE

## 2025-09-02 RX ORDER — DICLOFENAC SODIUM 75 MG/1
0 TABLET, DELAYED RELEASE ORAL
Refills: 0 | DISCHARGE

## 2025-09-02 RX ORDER — OXYCODONE HYDROCHLORIDE 30 MG/1
5 TABLET ORAL ONCE
Refills: 0 | Status: DISCONTINUED | OUTPATIENT
Start: 2025-09-02 | End: 2025-09-03

## 2025-09-02 RX ORDER — GABAPENTIN 400 MG/1
2 CAPSULE ORAL
Refills: 0 | DISCHARGE

## 2025-09-02 RX ORDER — SODIUM CHLORIDE 9 G/1000ML
1000 INJECTION, SOLUTION INTRAVENOUS
Refills: 0 | Status: DISCONTINUED | OUTPATIENT
Start: 2025-09-02 | End: 2025-09-03

## 2025-09-02 RX ORDER — AMOXICILLIN AND CLAVULANATE POTASSIUM 875; 125 MG/1; MG/1
875-125 TABLET, COATED ORAL
Qty: 6 | Refills: 0 | Status: ACTIVE | COMMUNITY
Start: 2025-09-02 | End: 1900-01-01

## 2025-09-02 RX ADMIN — SODIUM CHLORIDE 150 MILLILITER(S): 9 INJECTION, SOLUTION INTRAVENOUS at 18:29

## 2025-09-03 ENCOUNTER — NON-APPOINTMENT (OUTPATIENT)
Age: 80
End: 2025-09-03

## 2025-09-04 ENCOUNTER — APPOINTMENT (OUTPATIENT)
Dept: CARDIOLOGY | Facility: CLINIC | Age: 80
End: 2025-09-04
Payer: MEDICARE

## 2025-09-04 VITALS
WEIGHT: 150 LBS | BODY MASS INDEX: 25.61 KG/M2 | SYSTOLIC BLOOD PRESSURE: 114 MMHG | HEIGHT: 64 IN | HEART RATE: 53 BPM | DIASTOLIC BLOOD PRESSURE: 70 MMHG

## 2025-09-04 DIAGNOSIS — I10 ESSENTIAL (PRIMARY) HYPERTENSION: ICD-10-CM

## 2025-09-04 DIAGNOSIS — I77.810 THORACIC AORTIC ECTASIA: ICD-10-CM

## 2025-09-04 PROCEDURE — 93000 ELECTROCARDIOGRAM COMPLETE: CPT

## 2025-09-04 PROCEDURE — 99214 OFFICE O/P EST MOD 30 MIN: CPT

## 2025-09-04 RX ORDER — METOPROLOL SUCCINATE 50 MG/1
50 TABLET, EXTENDED RELEASE ORAL DAILY
Refills: 0 | Status: ACTIVE | COMMUNITY

## 2025-09-07 PROBLEM — I77.810 ASCENDING AORTA DILATION: Status: ACTIVE | Noted: 2025-09-07

## 2025-09-10 DIAGNOSIS — N18.30 CHRONIC KIDNEY DISEASE, STAGE 3 UNSPECIFIED: ICD-10-CM

## 2025-09-10 DIAGNOSIS — I12.9 HYPERTENSIVE CHRONIC KIDNEY DISEASE WITH STAGE 1 THROUGH STAGE 4 CHRONIC KIDNEY DISEASE, OR UNSPECIFIED CHRONIC KIDNEY DISEASE: ICD-10-CM

## 2025-09-10 DIAGNOSIS — N20.0 CALCULUS OF KIDNEY: ICD-10-CM

## 2025-09-15 ENCOUNTER — APPOINTMENT (OUTPATIENT)
Dept: UROLOGY | Facility: CLINIC | Age: 80
End: 2025-09-15
Payer: MEDICARE

## 2025-09-15 DIAGNOSIS — N20.0 CALCULUS OF KIDNEY: ICD-10-CM

## 2025-09-15 PROCEDURE — 52310 CYSTOSCOPY AND TREATMENT: CPT
